# Patient Record
Sex: MALE | Race: WHITE | NOT HISPANIC OR LATINO | Employment: OTHER | URBAN - METROPOLITAN AREA
[De-identification: names, ages, dates, MRNs, and addresses within clinical notes are randomized per-mention and may not be internally consistent; named-entity substitution may affect disease eponyms.]

---

## 2023-03-03 LAB
ANION GAP IN SER/PLAS: 12 MMOL/L (ref 10–20)
CALCIUM (MG/DL) IN SER/PLAS: 9 MG/DL (ref 8.6–10.3)
CARBON DIOXIDE, TOTAL (MMOL/L) IN SER/PLAS: 30 MMOL/L (ref 21–32)
CHLORIDE (MMOL/L) IN SER/PLAS: 100 MMOL/L (ref 98–107)
CREATININE (MG/DL) IN SER/PLAS: 0.91 MG/DL (ref 0.5–1.3)
ERYTHROCYTE DISTRIBUTION WIDTH (RATIO) BY AUTOMATED COUNT: 17.6 % (ref 11.5–14.5)
ERYTHROCYTE MEAN CORPUSCULAR HEMOGLOBIN CONCENTRATION (G/DL) BY AUTOMATED: 29 G/DL (ref 32–36)
ERYTHROCYTE MEAN CORPUSCULAR VOLUME (FL) BY AUTOMATED COUNT: 86 FL (ref 80–100)
ERYTHROCYTES (10*6/UL) IN BLOOD BY AUTOMATED COUNT: 5.42 X10E12/L (ref 4.5–5.9)
GFR MALE: >90 ML/MIN/1.73M2
GLUCOSE (MG/DL) IN SER/PLAS: 229 MG/DL (ref 74–99)
HEMATOCRIT (%) IN BLOOD BY AUTOMATED COUNT: 46.5 % (ref 41–52)
HEMOGLOBIN (G/DL) IN BLOOD: 13.5 G/DL (ref 13.5–17.5)
LEUKOCYTES (10*3/UL) IN BLOOD BY AUTOMATED COUNT: 12.2 X10E9/L (ref 4.4–11.3)
NRBC (PER 100 WBCS) BY AUTOMATED COUNT: 0 /100 WBC (ref 0–0)
PLATELETS (10*3/UL) IN BLOOD AUTOMATED COUNT: 304 X10E9/L (ref 150–450)
POTASSIUM (MMOL/L) IN SER/PLAS: 4.2 MMOL/L (ref 3.5–5.3)
SODIUM (MMOL/L) IN SER/PLAS: 138 MMOL/L (ref 136–145)
UREA NITROGEN (MG/DL) IN SER/PLAS: 26 MG/DL (ref 6–23)

## 2023-03-10 LAB
ANION GAP IN SER/PLAS: 13 MMOL/L (ref 10–20)
CALCIUM (MG/DL) IN SER/PLAS: 8.4 MG/DL (ref 8.6–10.3)
CARBON DIOXIDE, TOTAL (MMOL/L) IN SER/PLAS: 28 MMOL/L (ref 21–32)
CHLORIDE (MMOL/L) IN SER/PLAS: 100 MMOL/L (ref 98–107)
CREATININE (MG/DL) IN SER/PLAS: 0.72 MG/DL (ref 0.5–1.3)
ERYTHROCYTE DISTRIBUTION WIDTH (RATIO) BY AUTOMATED COUNT: 16.8 % (ref 11.5–14.5)
ERYTHROCYTE MEAN CORPUSCULAR HEMOGLOBIN CONCENTRATION (G/DL) BY AUTOMATED: 29.7 G/DL (ref 32–36)
ERYTHROCYTE MEAN CORPUSCULAR VOLUME (FL) BY AUTOMATED COUNT: 88 FL (ref 80–100)
ERYTHROCYTES (10*6/UL) IN BLOOD BY AUTOMATED COUNT: 4.94 X10E12/L (ref 4.5–5.9)
ESTIMATED AVERAGE GLUCOSE FOR HBA1C: 177 MG/DL
GFR MALE: >90 ML/MIN/1.73M2
GLUCOSE (MG/DL) IN SER/PLAS: 214 MG/DL (ref 74–99)
HEMATOCRIT (%) IN BLOOD BY AUTOMATED COUNT: 43.4 % (ref 41–52)
HEMOGLOBIN (G/DL) IN BLOOD: 12.9 G/DL (ref 13.5–17.5)
HEMOGLOBIN A1C/HEMOGLOBIN TOTAL IN BLOOD: 7.8 %
LEUKOCYTES (10*3/UL) IN BLOOD BY AUTOMATED COUNT: 7.7 X10E9/L (ref 4.4–11.3)
NRBC (PER 100 WBCS) BY AUTOMATED COUNT: 0 /100 WBC (ref 0–0)
PLATELETS (10*3/UL) IN BLOOD AUTOMATED COUNT: 204 X10E9/L (ref 150–450)
POTASSIUM (MMOL/L) IN SER/PLAS: 4.1 MMOL/L (ref 3.5–5.3)
SODIUM (MMOL/L) IN SER/PLAS: 137 MMOL/L (ref 136–145)
UREA NITROGEN (MG/DL) IN SER/PLAS: 16 MG/DL (ref 6–23)

## 2023-05-16 LAB
ANION GAP IN SER/PLAS: 13 MMOL/L (ref 10–20)
CALCIUM (MG/DL) IN SER/PLAS: 8.7 MG/DL (ref 8.6–10.3)
CARBON DIOXIDE, TOTAL (MMOL/L) IN SER/PLAS: 30 MMOL/L (ref 21–32)
CHLORIDE (MMOL/L) IN SER/PLAS: 101 MMOL/L (ref 98–107)
CREATININE (MG/DL) IN SER/PLAS: 0.92 MG/DL (ref 0.5–1.3)
ERYTHROCYTE DISTRIBUTION WIDTH (RATIO) BY AUTOMATED COUNT: 15.5 % (ref 11.5–14.5)
ERYTHROCYTE MEAN CORPUSCULAR HEMOGLOBIN CONCENTRATION (G/DL) BY AUTOMATED: 29.5 G/DL (ref 32–36)
ERYTHROCYTE MEAN CORPUSCULAR VOLUME (FL) BY AUTOMATED COUNT: 86 FL (ref 80–100)
ERYTHROCYTES (10*6/UL) IN BLOOD BY AUTOMATED COUNT: 5.02 X10E12/L (ref 4.5–5.9)
GFR MALE: >90 ML/MIN/1.73M2
GLUCOSE (MG/DL) IN SER/PLAS: 160 MG/DL (ref 74–99)
HEMATOCRIT (%) IN BLOOD BY AUTOMATED COUNT: 43.4 % (ref 41–52)
HEMOGLOBIN (G/DL) IN BLOOD: 12.8 G/DL (ref 13.5–17.5)
LEUKOCYTES (10*3/UL) IN BLOOD BY AUTOMATED COUNT: 8.6 X10E9/L (ref 4.4–11.3)
NRBC (PER 100 WBCS) BY AUTOMATED COUNT: 0 /100 WBC (ref 0–0)
PLATELETS (10*3/UL) IN BLOOD AUTOMATED COUNT: 289 X10E9/L (ref 150–450)
POTASSIUM (MMOL/L) IN SER/PLAS: 4.1 MMOL/L (ref 3.5–5.3)
SODIUM (MMOL/L) IN SER/PLAS: 140 MMOL/L (ref 136–145)
UREA NITROGEN (MG/DL) IN SER/PLAS: 25 MG/DL (ref 6–23)

## 2023-07-03 LAB
ALANINE AMINOTRANSFERASE (SGPT) (U/L) IN SER/PLAS: 11 U/L (ref 10–52)
ALBUMIN (G/DL) IN SER/PLAS: 4.1 G/DL (ref 3.4–5)
ALKALINE PHOSPHATASE (U/L) IN SER/PLAS: 80 U/L (ref 33–120)
ANION GAP IN SER/PLAS: 11 MMOL/L (ref 10–20)
ASPARTATE AMINOTRANSFERASE (SGOT) (U/L) IN SER/PLAS: 15 U/L (ref 9–39)
BASOPHILS (10*3/UL) IN BLOOD BY AUTOMATED COUNT: 0.06 X10E9/L (ref 0–0.1)
BASOPHILS/100 LEUKOCYTES IN BLOOD BY AUTOMATED COUNT: 0.6 % (ref 0–2)
BILIRUBIN TOTAL (MG/DL) IN SER/PLAS: 0.2 MG/DL (ref 0–1.2)
CALCIUM (MG/DL) IN SER/PLAS: 9.4 MG/DL (ref 8.6–10.3)
CARBON DIOXIDE, TOTAL (MMOL/L) IN SER/PLAS: 32 MMOL/L (ref 21–32)
CHLORIDE (MMOL/L) IN SER/PLAS: 100 MMOL/L (ref 98–107)
CREATININE (MG/DL) IN SER/PLAS: 0.76 MG/DL (ref 0.5–1.3)
EOSINOPHILS (10*3/UL) IN BLOOD BY AUTOMATED COUNT: 0.46 X10E9/L (ref 0–0.7)
EOSINOPHILS/100 LEUKOCYTES IN BLOOD BY AUTOMATED COUNT: 4.5 % (ref 0–6)
ERYTHROCYTE DISTRIBUTION WIDTH (RATIO) BY AUTOMATED COUNT: 15 % (ref 11.5–14.5)
ERYTHROCYTE MEAN CORPUSCULAR HEMOGLOBIN CONCENTRATION (G/DL) BY AUTOMATED: 28.9 G/DL (ref 32–36)
ERYTHROCYTE MEAN CORPUSCULAR VOLUME (FL) BY AUTOMATED COUNT: 85 FL (ref 80–100)
ERYTHROCYTES (10*6/UL) IN BLOOD BY AUTOMATED COUNT: 4.99 X10E12/L (ref 4.5–5.9)
GFR MALE: >90 ML/MIN/1.73M2
GLUCOSE (MG/DL) IN SER/PLAS: 129 MG/DL (ref 74–99)
HEMATOCRIT (%) IN BLOOD BY AUTOMATED COUNT: 42.2 % (ref 41–52)
HEMOGLOBIN (G/DL) IN BLOOD: 12.2 G/DL (ref 13.5–17.5)
IMMATURE GRANULOCYTES/100 LEUKOCYTES IN BLOOD BY AUTOMATED COUNT: 0.2 % (ref 0–0.9)
LEUKOCYTES (10*3/UL) IN BLOOD BY AUTOMATED COUNT: 10.2 X10E9/L (ref 4.4–11.3)
LYMPHOCYTES (10*3/UL) IN BLOOD BY AUTOMATED COUNT: 3.29 X10E9/L (ref 1.2–4.8)
LYMPHOCYTES/100 LEUKOCYTES IN BLOOD BY AUTOMATED COUNT: 32.2 % (ref 13–44)
MONOCYTES (10*3/UL) IN BLOOD BY AUTOMATED COUNT: 0.93 X10E9/L (ref 0.1–1)
MONOCYTES/100 LEUKOCYTES IN BLOOD BY AUTOMATED COUNT: 9.1 % (ref 2–10)
NEUTROPHILS (10*3/UL) IN BLOOD BY AUTOMATED COUNT: 5.46 X10E9/L (ref 1.2–7.7)
NEUTROPHILS/100 LEUKOCYTES IN BLOOD BY AUTOMATED COUNT: 53.4 % (ref 40–80)
NRBC (PER 100 WBCS) BY AUTOMATED COUNT: 0 /100 WBC (ref 0–0)
PLATELETS (10*3/UL) IN BLOOD AUTOMATED COUNT: 338 X10E9/L (ref 150–450)
POTASSIUM (MMOL/L) IN SER/PLAS: 4.3 MMOL/L (ref 3.5–5.3)
PROTEIN TOTAL: 7.3 G/DL (ref 6.4–8.2)
SODIUM (MMOL/L) IN SER/PLAS: 139 MMOL/L (ref 136–145)
UREA NITROGEN (MG/DL) IN SER/PLAS: 24 MG/DL (ref 6–23)

## 2023-11-24 ENCOUNTER — APPOINTMENT (OUTPATIENT)
Dept: RADIOLOGY | Facility: HOSPITAL | Age: 53
End: 2023-11-24
Payer: COMMERCIAL

## 2023-11-24 ENCOUNTER — HOSPITAL ENCOUNTER (INPATIENT)
Facility: HOSPITAL | Age: 53
LOS: 11 days | Discharge: NURSING, REHAB OR LONG-TERM CARE | End: 2023-12-06
Attending: STUDENT IN AN ORGANIZED HEALTH CARE EDUCATION/TRAINING PROGRAM | Admitting: INTERNAL MEDICINE
Payer: COMMERCIAL

## 2023-11-24 DIAGNOSIS — N10 ACUTE PYELONEPHRITIS: ICD-10-CM

## 2023-11-24 DIAGNOSIS — A41.9 SEPSIS, DUE TO UNSPECIFIED ORGANISM, UNSPECIFIED WHETHER ACUTE ORGAN DYSFUNCTION PRESENT (MULTI): ICD-10-CM

## 2023-11-24 DIAGNOSIS — N20.0 KIDNEY STONE ON LEFT SIDE: Primary | ICD-10-CM

## 2023-11-24 DIAGNOSIS — R60.9 SWELLING: ICD-10-CM

## 2023-11-24 LAB
ALBUMIN SERPL BCP-MCNC: 3.6 G/DL (ref 3.4–5)
ALP SERPL-CCNC: 75 U/L (ref 33–120)
ALT SERPL W P-5'-P-CCNC: 46 U/L (ref 10–52)
ANION GAP SERPL CALC-SCNC: 15 MMOL/L (ref 10–20)
APPEARANCE UR: ABNORMAL
AST SERPL W P-5'-P-CCNC: 40 U/L (ref 9–39)
BACTERIA #/AREA URNS AUTO: ABNORMAL /HPF
BASOPHILS # BLD AUTO: 0.03 X10*3/UL (ref 0–0.1)
BASOPHILS NFR BLD AUTO: 0.1 %
BILIRUB DIRECT SERPL-MCNC: 0.1 MG/DL (ref 0–0.3)
BILIRUB SERPL-MCNC: 0.4 MG/DL (ref 0–1.2)
BILIRUB UR STRIP.AUTO-MCNC: NEGATIVE MG/DL
BUN SERPL-MCNC: 26 MG/DL (ref 6–23)
CALCIUM SERPL-MCNC: 9 MG/DL (ref 8.6–10.3)
CHLORIDE SERPL-SCNC: 98 MMOL/L (ref 98–107)
CO2 SERPL-SCNC: 27 MMOL/L (ref 21–32)
COLOR UR: YELLOW
CREAT SERPL-MCNC: 0.92 MG/DL (ref 0.5–1.3)
EOSINOPHIL # BLD AUTO: 0.71 X10*3/UL (ref 0–0.7)
EOSINOPHIL NFR BLD AUTO: 3.3 %
ERYTHROCYTE [DISTWIDTH] IN BLOOD BY AUTOMATED COUNT: 18.7 % (ref 11.5–14.5)
GFR SERPL CREATININE-BSD FRML MDRD: >90 ML/MIN/1.73M*2
GLUCOSE SERPL-MCNC: 184 MG/DL (ref 74–99)
GLUCOSE UR STRIP.AUTO-MCNC: NEGATIVE MG/DL
HCT VFR BLD AUTO: 49.8 % (ref 41–52)
HGB BLD-MCNC: 14.2 G/DL (ref 13.5–17.5)
HOLD SPECIMEN: NORMAL
IMM GRANULOCYTES # BLD AUTO: 0.2 X10*3/UL (ref 0–0.7)
IMM GRANULOCYTES NFR BLD AUTO: 0.9 % (ref 0–0.9)
KETONES UR STRIP.AUTO-MCNC: NEGATIVE MG/DL
LACTATE SERPL-SCNC: 1.3 MMOL/L (ref 0.4–2)
LEUKOCYTE ESTERASE UR QL STRIP.AUTO: ABNORMAL
LIPASE SERPL-CCNC: 111 U/L (ref 9–82)
LYMPHOCYTES # BLD AUTO: 2.22 X10*3/UL (ref 1.2–4.8)
LYMPHOCYTES NFR BLD AUTO: 10.4 %
MCH RBC QN AUTO: 21.7 PG (ref 26–34)
MCHC RBC AUTO-ENTMCNC: 28.5 G/DL (ref 32–36)
MCV RBC AUTO: 76 FL (ref 80–100)
MONOCYTES # BLD AUTO: 1.57 X10*3/UL (ref 0.1–1)
MONOCYTES NFR BLD AUTO: 7.4 %
NEUTROPHILS # BLD AUTO: 16.61 X10*3/UL (ref 1.2–7.7)
NEUTROPHILS NFR BLD AUTO: 77.9 %
NITRITE UR QL STRIP.AUTO: POSITIVE
NRBC BLD-RTO: 0 /100 WBCS (ref 0–0)
PH UR STRIP.AUTO: 6 [PH]
PLATELET # BLD AUTO: 309 X10*3/UL (ref 150–450)
POTASSIUM SERPL-SCNC: 5.4 MMOL/L (ref 3.5–5.3)
PROT SERPL-MCNC: 6.8 G/DL (ref 6.4–8.2)
PROT UR STRIP.AUTO-MCNC: ABNORMAL MG/DL
RBC # BLD AUTO: 6.54 X10*6/UL (ref 4.5–5.9)
RBC # UR STRIP.AUTO: ABNORMAL /UL
RBC #/AREA URNS AUTO: >20 /HPF
SODIUM SERPL-SCNC: 135 MMOL/L (ref 136–145)
SP GR UR STRIP.AUTO: 1.01
UROBILINOGEN UR STRIP.AUTO-MCNC: <2 MG/DL
WBC # BLD AUTO: 21.3 X10*3/UL (ref 4.4–11.3)
WBC #/AREA URNS AUTO: >50 /HPF
WBC CLUMPS #/AREA URNS AUTO: ABNORMAL /HPF

## 2023-11-24 PROCEDURE — 36415 COLL VENOUS BLD VENIPUNCTURE: CPT | Performed by: STUDENT IN AN ORGANIZED HEALTH CARE EDUCATION/TRAINING PROGRAM

## 2023-11-24 PROCEDURE — 83690 ASSAY OF LIPASE: CPT | Performed by: STUDENT IN AN ORGANIZED HEALTH CARE EDUCATION/TRAINING PROGRAM

## 2023-11-24 PROCEDURE — 87086 URINE CULTURE/COLONY COUNT: CPT | Mod: PARLAB | Performed by: STUDENT IN AN ORGANIZED HEALTH CARE EDUCATION/TRAINING PROGRAM

## 2023-11-24 PROCEDURE — 99291 CRITICAL CARE FIRST HOUR: CPT | Mod: 25 | Performed by: STUDENT IN AN ORGANIZED HEALTH CARE EDUCATION/TRAINING PROGRAM

## 2023-11-24 PROCEDURE — 82248 BILIRUBIN DIRECT: CPT | Performed by: STUDENT IN AN ORGANIZED HEALTH CARE EDUCATION/TRAINING PROGRAM

## 2023-11-24 PROCEDURE — 74177 CT ABD & PELVIS W/CONTRAST: CPT | Performed by: RADIOLOGY

## 2023-11-24 PROCEDURE — 74177 CT ABD & PELVIS W/CONTRAST: CPT

## 2023-11-24 PROCEDURE — 87186 SC STD MICRODIL/AGAR DIL: CPT | Mod: PARLAB | Performed by: STUDENT IN AN ORGANIZED HEALTH CARE EDUCATION/TRAINING PROGRAM

## 2023-11-24 PROCEDURE — 81001 URINALYSIS AUTO W/SCOPE: CPT | Performed by: STUDENT IN AN ORGANIZED HEALTH CARE EDUCATION/TRAINING PROGRAM

## 2023-11-24 PROCEDURE — 87077 CULTURE AEROBIC IDENTIFY: CPT | Mod: PARLAB | Performed by: STUDENT IN AN ORGANIZED HEALTH CARE EDUCATION/TRAINING PROGRAM

## 2023-11-24 PROCEDURE — 96375 TX/PRO/DX INJ NEW DRUG ADDON: CPT

## 2023-11-24 PROCEDURE — 2500000004 HC RX 250 GENERAL PHARMACY W/ HCPCS (ALT 636 FOR OP/ED): Performed by: STUDENT IN AN ORGANIZED HEALTH CARE EDUCATION/TRAINING PROGRAM

## 2023-11-24 PROCEDURE — 2550000001 HC RX 255 CONTRASTS: Performed by: STUDENT IN AN ORGANIZED HEALTH CARE EDUCATION/TRAINING PROGRAM

## 2023-11-24 PROCEDURE — 96365 THER/PROPH/DIAG IV INF INIT: CPT

## 2023-11-24 PROCEDURE — 83605 ASSAY OF LACTIC ACID: CPT | Performed by: STUDENT IN AN ORGANIZED HEALTH CARE EDUCATION/TRAINING PROGRAM

## 2023-11-24 PROCEDURE — 87040 BLOOD CULTURE FOR BACTERIA: CPT | Performed by: STUDENT IN AN ORGANIZED HEALTH CARE EDUCATION/TRAINING PROGRAM

## 2023-11-24 PROCEDURE — 80053 COMPREHEN METABOLIC PANEL: CPT | Performed by: STUDENT IN AN ORGANIZED HEALTH CARE EDUCATION/TRAINING PROGRAM

## 2023-11-24 PROCEDURE — 85025 COMPLETE CBC W/AUTO DIFF WBC: CPT | Performed by: STUDENT IN AN ORGANIZED HEALTH CARE EDUCATION/TRAINING PROGRAM

## 2023-11-24 PROCEDURE — 87040 BLOOD CULTURE FOR BACTERIA: CPT | Mod: PARLAB | Performed by: STUDENT IN AN ORGANIZED HEALTH CARE EDUCATION/TRAINING PROGRAM

## 2023-11-24 PROCEDURE — 96361 HYDRATE IV INFUSION ADD-ON: CPT

## 2023-11-24 RX ORDER — MORPHINE SULFATE 4 MG/ML
4 INJECTION, SOLUTION INTRAMUSCULAR; INTRAVENOUS ONCE
Status: COMPLETED | OUTPATIENT
Start: 2023-11-24 | End: 2023-11-24

## 2023-11-24 RX ORDER — ONDANSETRON HYDROCHLORIDE 2 MG/ML
4 INJECTION, SOLUTION INTRAVENOUS ONCE
Status: COMPLETED | OUTPATIENT
Start: 2023-11-24 | End: 2023-11-24

## 2023-11-24 RX ORDER — KETOROLAC TROMETHAMINE 30 MG/ML
15 INJECTION, SOLUTION INTRAMUSCULAR; INTRAVENOUS ONCE
Status: COMPLETED | OUTPATIENT
Start: 2023-11-24 | End: 2023-11-24

## 2023-11-24 RX ORDER — CEFTRIAXONE 2 G/50ML
2 INJECTION, SOLUTION INTRAVENOUS ONCE
Status: COMPLETED | OUTPATIENT
Start: 2023-11-24 | End: 2023-11-24

## 2023-11-24 RX ADMIN — CEFTRIAXONE SODIUM 2 G: 2 INJECTION, SOLUTION INTRAVENOUS at 22:02

## 2023-11-24 RX ADMIN — MORPHINE SULFATE 4 MG: 4 INJECTION, SOLUTION INTRAMUSCULAR; INTRAVENOUS at 17:44

## 2023-11-24 RX ADMIN — IOHEXOL 75 ML: 350 INJECTION, SOLUTION INTRAVENOUS at 20:59

## 2023-11-24 RX ADMIN — KETOROLAC TROMETHAMINE 15 MG: 30 INJECTION INTRAMUSCULAR; INTRAVENOUS at 19:51

## 2023-11-24 RX ADMIN — ONDANSETRON 4 MG: 2 INJECTION INTRAMUSCULAR; INTRAVENOUS at 17:44

## 2023-11-24 RX ADMIN — SODIUM CHLORIDE, POTASSIUM CHLORIDE, SODIUM LACTATE AND CALCIUM CHLORIDE 1000 ML: 600; 310; 30; 20 INJECTION, SOLUTION INTRAVENOUS at 19:51

## 2023-11-24 ASSESSMENT — COLUMBIA-SUICIDE SEVERITY RATING SCALE - C-SSRS
1. IN THE PAST MONTH, HAVE YOU WISHED YOU WERE DEAD OR WISHED YOU COULD GO TO SLEEP AND NOT WAKE UP?: NO
6. HAVE YOU EVER DONE ANYTHING, STARTED TO DO ANYTHING, OR PREPARED TO DO ANYTHING TO END YOUR LIFE?: NO
2. HAVE YOU ACTUALLY HAD ANY THOUGHTS OF KILLING YOURSELF?: NO

## 2023-11-24 ASSESSMENT — PAIN - FUNCTIONAL ASSESSMENT
PAIN_FUNCTIONAL_ASSESSMENT: 0-10

## 2023-11-24 ASSESSMENT — LIFESTYLE VARIABLES
HAVE YOU EVER FELT YOU SHOULD CUT DOWN ON YOUR DRINKING: NO
EVER FELT BAD OR GUILTY ABOUT YOUR DRINKING: NO
REASON UNABLE TO ASSESS: NO
EVER HAD A DRINK FIRST THING IN THE MORNING TO STEADY YOUR NERVES TO GET RID OF A HANGOVER: NO
HAVE PEOPLE ANNOYED YOU BY CRITICIZING YOUR DRINKING: NO

## 2023-11-24 ASSESSMENT — PAIN SCALES - GENERAL
PAINLEVEL_OUTOF10: 9
PAINLEVEL_OUTOF10: 10 - WORST POSSIBLE PAIN
PAINLEVEL_OUTOF10: 3

## 2023-11-24 ASSESSMENT — PAIN DESCRIPTION - PROGRESSION: CLINICAL_PROGRESSION: NOT CHANGED

## 2023-11-24 ASSESSMENT — PAIN DESCRIPTION - PAIN TYPE: TYPE: ACUTE PAIN

## 2023-11-24 ASSESSMENT — PAIN DESCRIPTION - DESCRIPTORS: DESCRIPTORS: STABBING

## 2023-11-24 NOTE — ED PROVIDER NOTES
HPI   Chief Complaint   Patient presents with    Flank Pain       Presents with left-sided flank pain.  Onset today.  Intermittent.  Associated nausea.  Feels like prior kidney stone.  No change in output from ostomy.  States that he normally urinates into a urinal and has been noticing that some hesitancy, no gross blood or dysuria.  No reported falls.      History provided by:  EMS personnel and patient   used: No                        Com[place] Coma Scale Score: 15                  Patient History   No past medical history on file.  No past surgical history on file.  No family history on file.  Social History     Tobacco Use    Smoking status: Not on file    Smokeless tobacco: Not on file   Substance Use Topics    Alcohol use: Not on file    Drug use: Not on file       Physical Exam   ED Triage Vitals   Temp Pulse Resp BP   -- -- -- --      SpO2 Temp src Heart Rate Source Patient Position   -- -- -- --      BP Location FiO2 (%)     -- --       Physical Exam  Vitals and nursing note reviewed.   HENT:      Head: Atraumatic.      Mouth/Throat:      Mouth: Mucous membranes are moist.   Eyes:      Conjunctiva/sclera: Conjunctivae normal.   Cardiovascular:      Rate and Rhythm: Normal rate and regular rhythm.   Pulmonary:      Effort: Pulmonary effort is normal.      Comments: On trach.  Coarse breath sounds bilaterally.  Abdominal:      Palpations: Abdomen is soft.      Tenderness: There is abdominal tenderness (Left upper quadrant, left lower quadrant, and left flank tenderness palpation without guarding or rebound.).   Genitourinary:     Comments: No CVA tenderness.  Musculoskeletal:         General: No deformity.      Cervical back: Normal range of motion.   Skin:     General: Skin is warm and dry.      Comments: No rash on flanks   Neurological:      Mental Status: He is alert.      Comments: Baseline contracture of right upper extremity.         ED Course & MDM   ED Course as of 11/25/23 0047    Fri Nov 24, 2023 1742 WBC(!): 21.3 [AB]   1745 My ECG interpretation: Normal sinus rhythm heart rate 105, no ST segment elevation, QTc 430 [AB]   2034 Urinalysis with Reflex Microscopic and Culture(!)  Reached out to pharmacy for assistance with antibiotics for UTI, given 5/2022 urine culture significant for ESBL Klebsiella [AB]   2149 CT abdomen pelvis w IV contrast  Per my view the CT of the abdomen and pelvis with IV contrast, there is an approximately 5 mm proximal ureteral stone on the left with hydronephrosis.  Will reach out to urology for concerns of obstructed, infected stone. [AB]   2206 Spoke with Urology -- recommends IR for perc neph tube [AB]   2259 Spoke with Dr. Desir with IR -- will place perc neph tube in AM.  Given hemodynamic stability, I think this is reasonable.  Will reach back out to IR, if patient decompensates/cannot wait until the AM. [AB]      ED Course User Index  [AB] Dayday Mckeon MD         Diagnoses as of 11/25/23 0047   Kidney stone on left side   Sepsis, due to unspecified organism, unspecified whether acute organ dysfunction present (CMS/[facility])   Acute pyelonephritis       Medical Decision Making  Clinical picture consistent with obstructing and infected ureteral stone complicated by sepsis.  Concern for sepsis of urinary etiology at approximately 1942 when the patient is urinalysis resulted.    Urology and interventional radiology were consulted for the obstructive process.    I performed a sepsis reperfusion exam at approximately 2300.  Patient remained warm and well-perfused.    Amount and/or Complexity of Data Reviewed  Labs: ordered.  Radiology: ordered and independent interpretation performed. Decision-making details documented in ED Course.  ECG/medicine tests: ordered and independent interpretation performed. Decision-making details documented in ED Course.  Discussion of management or test interpretation with external provider(s): The admitting provider, the  urologist on-call, and the interventional radiologist on-call    Risk  Decision regarding hospitalization.        Procedure  Critical Care    Performed by: Dayday Mckeon MD  Authorized by: Dayday Mckeon MD    Critical care provider statement:     Critical care time (minutes):  45    Critical care time was exclusive of:  Separately billable procedures and treating other patients    Critical care was necessary to treat or prevent imminent or life-threatening deterioration of the following conditions:  Sepsis    Critical care was time spent personally by me on the following activities:  Blood draw for specimens, development of treatment plan with patient or surrogate, discussions with consultants, evaluation of patient's response to treatment, examination of patient, obtaining history from patient or surrogate, ordering and performing treatments and interventions, ordering and review of laboratory studies, ordering and review of radiographic studies, pulse oximetry, review of old charts and re-evaluation of patient's condition    I assumed direction of critical care for this patient from another provider in my specialty: no      Care discussed with: admitting provider         Dayday Mckeon MD  11/25/23 0047

## 2023-11-24 NOTE — Clinical Note
Left neph tube in place by sutures, m-fix and tegaderm - attached to drainage bag. Procedure end. Best rest for 3 hours per Dr. Desir

## 2023-11-25 ENCOUNTER — PREP FOR PROCEDURE (OUTPATIENT)
Dept: RADIOLOGY | Facility: HOSPITAL | Age: 53
End: 2023-11-25
Payer: COMMERCIAL

## 2023-11-25 ENCOUNTER — HOSPITAL ENCOUNTER (OUTPATIENT)
Dept: CARDIOLOGY | Facility: HOSPITAL | Age: 53
Discharge: HOME | End: 2023-11-25
Payer: COMMERCIAL

## 2023-11-25 ENCOUNTER — APPOINTMENT (OUTPATIENT)
Dept: RADIOLOGY | Facility: HOSPITAL | Age: 53
End: 2023-11-25
Payer: COMMERCIAL

## 2023-11-25 DIAGNOSIS — N20.0 KIDNEY STONE ON LEFT SIDE: Primary | ICD-10-CM

## 2023-11-25 DIAGNOSIS — I24.9 ACS (ACUTE CORONARY SYNDROME) (MULTI): ICD-10-CM

## 2023-11-25 LAB
ALBUMIN SERPL BCP-MCNC: 3.1 G/DL (ref 3.4–5)
ALP SERPL-CCNC: 75 U/L (ref 33–120)
ALT SERPL W P-5'-P-CCNC: 34 U/L (ref 10–52)
ANION GAP SERPL CALC-SCNC: 12 MMOL/L (ref 10–20)
AST SERPL W P-5'-P-CCNC: 24 U/L (ref 9–39)
BILIRUB SERPL-MCNC: 0.5 MG/DL (ref 0–1.2)
BUN SERPL-MCNC: 29 MG/DL (ref 6–23)
CALCIUM SERPL-MCNC: 8 MG/DL (ref 8.6–10.3)
CHLORIDE SERPL-SCNC: 99 MMOL/L (ref 98–107)
CO2 SERPL-SCNC: 28 MMOL/L (ref 21–32)
CREAT SERPL-MCNC: 1.54 MG/DL (ref 0.5–1.3)
ERYTHROCYTE [DISTWIDTH] IN BLOOD BY AUTOMATED COUNT: 18.6 % (ref 11.5–14.5)
GFR SERPL CREATININE-BSD FRML MDRD: 54 ML/MIN/1.73M*2
GLUCOSE BLD MANUAL STRIP-MCNC: 161 MG/DL (ref 74–99)
GLUCOSE BLD MANUAL STRIP-MCNC: 165 MG/DL (ref 74–99)
GLUCOSE BLD MANUAL STRIP-MCNC: 190 MG/DL (ref 74–99)
GLUCOSE BLD MANUAL STRIP-MCNC: 193 MG/DL (ref 74–99)
GLUCOSE SERPL-MCNC: 164 MG/DL (ref 74–99)
HCT VFR BLD AUTO: 44.2 % (ref 41–52)
HGB BLD-MCNC: 12.2 G/DL (ref 13.5–17.5)
INR PPP: 1.3 (ref 0.9–1.1)
MCH RBC QN AUTO: 22.1 PG (ref 26–34)
MCHC RBC AUTO-ENTMCNC: 27.6 G/DL (ref 32–36)
MCV RBC AUTO: 80 FL (ref 80–100)
NRBC BLD-RTO: 0 /100 WBCS (ref 0–0)
PLATELET # BLD AUTO: 269 X10*3/UL (ref 150–450)
POTASSIUM SERPL-SCNC: 5.3 MMOL/L (ref 3.5–5.3)
PROT SERPL-MCNC: 5.6 G/DL (ref 6.4–8.2)
PROTHROMBIN TIME: 14.4 SECONDS (ref 9.8–12.8)
RBC # BLD AUTO: 5.52 X10*6/UL (ref 4.5–5.9)
SODIUM SERPL-SCNC: 134 MMOL/L (ref 136–145)
WBC # BLD AUTO: 50 X10*3/UL (ref 4.4–11.3)

## 2023-11-25 PROCEDURE — 36415 COLL VENOUS BLD VENIPUNCTURE: CPT | Performed by: RADIOLOGY

## 2023-11-25 PROCEDURE — 2500000002 HC RX 250 W HCPCS SELF ADMINISTERED DRUGS (ALT 637 FOR MEDICARE OP, ALT 636 FOR OP/ED): Performed by: NURSE PRACTITIONER

## 2023-11-25 PROCEDURE — 2500000005 HC RX 250 GENERAL PHARMACY W/O HCPCS: Performed by: INTERNAL MEDICINE

## 2023-11-25 PROCEDURE — 87070 CULTURE OTHR SPECIMN AEROBIC: CPT | Mod: PARLAB | Performed by: RADIOLOGY

## 2023-11-25 PROCEDURE — 2500000005 HC RX 250 GENERAL PHARMACY W/O HCPCS: Performed by: NURSE PRACTITIONER

## 2023-11-25 PROCEDURE — 85027 COMPLETE CBC AUTOMATED: CPT | Performed by: NURSE PRACTITIONER

## 2023-11-25 PROCEDURE — 2500000004 HC RX 250 GENERAL PHARMACY W/ HCPCS (ALT 636 FOR OP/ED): Performed by: RADIOLOGY

## 2023-11-25 PROCEDURE — 82947 ASSAY GLUCOSE BLOOD QUANT: CPT

## 2023-11-25 PROCEDURE — 84075 ASSAY ALKALINE PHOSPHATASE: CPT | Performed by: NURSE PRACTITIONER

## 2023-11-25 PROCEDURE — 99152 MOD SED SAME PHYS/QHP 5/>YRS: CPT

## 2023-11-25 PROCEDURE — C1894 INTRO/SHEATH, NON-LASER: HCPCS

## 2023-11-25 PROCEDURE — 99152 MOD SED SAME PHYS/QHP 5/>YRS: CPT | Performed by: RADIOLOGY

## 2023-11-25 PROCEDURE — C1729 CATH, DRAINAGE: HCPCS

## 2023-11-25 PROCEDURE — 2060000001 HC INTERMEDIATE ICU ROOM DAILY

## 2023-11-25 PROCEDURE — 2500000004 HC RX 250 GENERAL PHARMACY W/ HCPCS (ALT 636 FOR OP/ED): Performed by: STUDENT IN AN ORGANIZED HEALTH CARE EDUCATION/TRAINING PROGRAM

## 2023-11-25 PROCEDURE — 2500000001 HC RX 250 WO HCPCS SELF ADMINISTERED DRUGS (ALT 637 FOR MEDICARE OP): Performed by: STUDENT IN AN ORGANIZED HEALTH CARE EDUCATION/TRAINING PROGRAM

## 2023-11-25 PROCEDURE — 0T9430Z DRAINAGE OF LEFT KIDNEY PELVIS WITH DRAINAGE DEVICE, PERCUTANEOUS APPROACH: ICD-10-PCS | Performed by: RADIOLOGY

## 2023-11-25 PROCEDURE — 2500000004 HC RX 250 GENERAL PHARMACY W/ HCPCS (ALT 636 FOR OP/ED): Performed by: NURSE PRACTITIONER

## 2023-11-25 PROCEDURE — 50432 PLMT NEPHROSTOMY CATHETER: CPT | Performed by: RADIOLOGY

## 2023-11-25 PROCEDURE — 2500000004 HC RX 250 GENERAL PHARMACY W/ HCPCS (ALT 636 FOR OP/ED): Performed by: EMERGENCY MEDICINE

## 2023-11-25 PROCEDURE — 85610 PROTHROMBIN TIME: CPT | Performed by: RADIOLOGY

## 2023-11-25 PROCEDURE — 99153 MOD SED SAME PHYS/QHP EA: CPT

## 2023-11-25 PROCEDURE — 93005 ELECTROCARDIOGRAM TRACING: CPT

## 2023-11-25 PROCEDURE — 2500000004 HC RX 250 GENERAL PHARMACY W/ HCPCS (ALT 636 FOR OP/ED): Performed by: INTERNAL MEDICINE

## 2023-11-25 PROCEDURE — 2500000001 HC RX 250 WO HCPCS SELF ADMINISTERED DRUGS (ALT 637 FOR MEDICARE OP): Performed by: NURSE PRACTITIONER

## 2023-11-25 PROCEDURE — 87186 SC STD MICRODIL/AGAR DIL: CPT | Mod: PARLAB | Performed by: RADIOLOGY

## 2023-11-25 PROCEDURE — 94640 AIRWAY INHALATION TREATMENT: CPT

## 2023-11-25 PROCEDURE — 36415 COLL VENOUS BLD VENIPUNCTURE: CPT | Performed by: NURSE PRACTITIONER

## 2023-11-25 PROCEDURE — 2720000007 HC OR 272 NO HCPCS

## 2023-11-25 PROCEDURE — 99153 MOD SED SAME PHYS/QHP EA: CPT | Performed by: RADIOLOGY

## 2023-11-25 RX ORDER — METOPROLOL TARTRATE 1 MG/ML
5 INJECTION, SOLUTION INTRAVENOUS ONCE
Status: COMPLETED | OUTPATIENT
Start: 2023-11-25 | End: 2023-11-25

## 2023-11-25 RX ORDER — LACOSAMIDE 100 MG/1
100 TABLET ORAL 2 TIMES DAILY
COMMUNITY
Start: 2022-05-04

## 2023-11-25 RX ORDER — LACOSAMIDE 100 MG/1
100 TABLET ORAL EVERY 12 HOURS SCHEDULED
Status: DISCONTINUED | OUTPATIENT
Start: 2023-11-25 | End: 2023-12-06 | Stop reason: HOSPADM

## 2023-11-25 RX ORDER — CYCLOBENZAPRINE HCL 10 MG
5 TABLET ORAL 2 TIMES DAILY PRN
Status: DISCONTINUED | OUTPATIENT
Start: 2023-11-25 | End: 2023-11-29

## 2023-11-25 RX ORDER — HYDROXYZINE PAMOATE 25 MG/1
25 CAPSULE ORAL EVERY 6 HOURS PRN
Status: DISCONTINUED | OUTPATIENT
Start: 2023-11-25 | End: 2023-12-06 | Stop reason: HOSPADM

## 2023-11-25 RX ORDER — LEVETIRACETAM 500 MG/1
750 TABLET ORAL 2 TIMES DAILY
Status: DISCONTINUED | OUTPATIENT
Start: 2023-11-25 | End: 2023-12-06 | Stop reason: HOSPADM

## 2023-11-25 RX ORDER — LORAZEPAM 1 MG/1
1 TABLET ORAL EVERY 6 HOURS PRN
COMMUNITY

## 2023-11-25 RX ORDER — IPRATROPIUM BROMIDE AND ALBUTEROL SULFATE 2.5; .5 MG/3ML; MG/3ML
3 SOLUTION RESPIRATORY (INHALATION) EVERY 2 HOUR PRN
Status: DISCONTINUED | OUTPATIENT
Start: 2023-11-25 | End: 2023-12-03

## 2023-11-25 RX ORDER — SERTRALINE HYDROCHLORIDE 50 MG/1
50 TABLET, FILM COATED ORAL DAILY
COMMUNITY
Start: 2018-04-05

## 2023-11-25 RX ORDER — ACETAMINOPHEN 325 MG/1
650 TABLET ORAL ONCE
Status: COMPLETED | OUTPATIENT
Start: 2023-11-25 | End: 2023-11-25

## 2023-11-25 RX ORDER — DEXTROSE MONOHYDRATE 100 MG/ML
0.3 INJECTION, SOLUTION INTRAVENOUS ONCE AS NEEDED
Status: DISCONTINUED | OUTPATIENT
Start: 2023-11-25 | End: 2023-12-06 | Stop reason: HOSPADM

## 2023-11-25 RX ORDER — INSULIN LISPRO 100 [IU]/ML
0-5 INJECTION, SOLUTION INTRAVENOUS; SUBCUTANEOUS
Status: DISCONTINUED | OUTPATIENT
Start: 2023-11-25 | End: 2023-12-06 | Stop reason: HOSPADM

## 2023-11-25 RX ORDER — CYCLOBENZAPRINE HCL 10 MG
5 TABLET ORAL 2 TIMES DAILY PRN
COMMUNITY

## 2023-11-25 RX ORDER — FOLIC ACID 1 MG/1
1 TABLET ORAL DAILY
COMMUNITY

## 2023-11-25 RX ORDER — LEVETIRACETAM 750 MG/1
750 TABLET ORAL 2 TIMES DAILY
COMMUNITY
Start: 2021-12-27

## 2023-11-25 RX ORDER — IBUPROFEN 600 MG/1
600 TABLET ORAL EVERY 6 HOURS PRN
Status: DISCONTINUED | OUTPATIENT
Start: 2023-11-25 | End: 2023-12-06 | Stop reason: HOSPADM

## 2023-11-25 RX ORDER — LORAZEPAM 0.5 MG/1
1 TABLET ORAL EVERY 6 HOURS PRN
Status: DISCONTINUED | OUTPATIENT
Start: 2023-11-25 | End: 2023-12-06 | Stop reason: HOSPADM

## 2023-11-25 RX ORDER — ONDANSETRON HYDROCHLORIDE 2 MG/ML
4 INJECTION, SOLUTION INTRAVENOUS EVERY 8 HOURS PRN
Status: DISCONTINUED | OUTPATIENT
Start: 2023-11-25 | End: 2023-12-06 | Stop reason: HOSPADM

## 2023-11-25 RX ORDER — BACLOFEN 10 MG/1
15 TABLET ORAL EVERY 6 HOURS PRN
Status: DISCONTINUED | OUTPATIENT
Start: 2023-11-25 | End: 2023-12-06 | Stop reason: HOSPADM

## 2023-11-25 RX ORDER — BUDESONIDE 0.5 MG/2ML
0.5 INHALANT ORAL
COMMUNITY
Start: 2022-03-23

## 2023-11-25 RX ORDER — DOCUSATE SODIUM 100 MG/1
100 CAPSULE, LIQUID FILLED ORAL 2 TIMES DAILY
Status: DISCONTINUED | OUTPATIENT
Start: 2023-11-25 | End: 2023-12-06 | Stop reason: HOSPADM

## 2023-11-25 RX ORDER — FOLIC ACID 1 MG/1
1 TABLET ORAL DAILY
Status: DISCONTINUED | OUTPATIENT
Start: 2023-11-25 | End: 2023-12-06 | Stop reason: HOSPADM

## 2023-11-25 RX ORDER — ONDANSETRON 4 MG/1
4 TABLET, ORALLY DISINTEGRATING ORAL EVERY 8 HOURS PRN
Status: DISCONTINUED | OUTPATIENT
Start: 2023-11-25 | End: 2023-12-06 | Stop reason: HOSPADM

## 2023-11-25 RX ORDER — CEFTRIAXONE 2 G/50ML
2 INJECTION, SOLUTION INTRAVENOUS EVERY 24 HOURS
Status: DISCONTINUED | OUTPATIENT
Start: 2023-11-25 | End: 2023-11-25

## 2023-11-25 RX ORDER — IPRATROPIUM BROMIDE AND ALBUTEROL SULFATE 2.5; .5 MG/3ML; MG/3ML
3 SOLUTION RESPIRATORY (INHALATION) EVERY 2 HOUR PRN
COMMUNITY

## 2023-11-25 RX ORDER — SODIUM CHLORIDE 9 MG/ML
100 INJECTION, SOLUTION INTRAVENOUS CONTINUOUS
Status: DISCONTINUED | OUTPATIENT
Start: 2023-11-25 | End: 2023-11-26

## 2023-11-25 RX ORDER — ACETAMINOPHEN 325 MG/1
650 TABLET ORAL EVERY 4 HOURS PRN
Status: DISCONTINUED | OUTPATIENT
Start: 2023-11-25 | End: 2023-12-06 | Stop reason: HOSPADM

## 2023-11-25 RX ORDER — LORATADINE 10 MG/1
5 TABLET ORAL DAILY
Status: DISCONTINUED | OUTPATIENT
Start: 2023-11-25 | End: 2023-12-06 | Stop reason: HOSPADM

## 2023-11-25 RX ORDER — ESOMEPRAZOLE MAGNESIUM 40 MG/1
40 GRANULE, DELAYED RELEASE ORAL
Status: DISCONTINUED | OUTPATIENT
Start: 2023-11-25 | End: 2023-12-06 | Stop reason: HOSPADM

## 2023-11-25 RX ORDER — MORPHINE SULFATE 4 MG/ML
4 INJECTION, SOLUTION INTRAMUSCULAR; INTRAVENOUS ONCE
Status: COMPLETED | OUTPATIENT
Start: 2023-11-25 | End: 2023-11-25

## 2023-11-25 RX ORDER — FENTANYL CITRATE 50 UG/ML
INJECTION, SOLUTION INTRAMUSCULAR; INTRAVENOUS
Status: COMPLETED | OUTPATIENT
Start: 2023-11-25 | End: 2023-11-25

## 2023-11-25 RX ORDER — OXYCODONE AND ACETAMINOPHEN 5; 325 MG/1; MG/1
1 TABLET ORAL 2 TIMES DAILY
COMMUNITY

## 2023-11-25 RX ORDER — METFORMIN HYDROCHLORIDE 500 MG/1
500 TABLET ORAL DAILY
COMMUNITY

## 2023-11-25 RX ORDER — HYDROCORTISONE 10 MG/ML
LOTION TOPICAL 2 TIMES DAILY
Status: DISCONTINUED | OUTPATIENT
Start: 2023-11-25 | End: 2023-12-06 | Stop reason: HOSPADM

## 2023-11-25 RX ORDER — SERTRALINE HYDROCHLORIDE 50 MG/1
50 TABLET, FILM COATED ORAL DAILY
Status: DISCONTINUED | OUTPATIENT
Start: 2023-11-25 | End: 2023-12-06 | Stop reason: HOSPADM

## 2023-11-25 RX ORDER — DEXTROSE 50 % IN WATER (D50W) INTRAVENOUS SYRINGE
25
Status: DISCONTINUED | OUTPATIENT
Start: 2023-11-25 | End: 2023-12-06 | Stop reason: HOSPADM

## 2023-11-25 RX ORDER — GUAIFENESIN/DEXTROMETHORPHAN 100-10MG/5
10 SYRUP ORAL EVERY 4 HOURS PRN
Status: DISCONTINUED | OUTPATIENT
Start: 2023-11-25 | End: 2023-12-06 | Stop reason: HOSPADM

## 2023-11-25 RX ORDER — PANTOPRAZOLE SODIUM 40 MG/1
40 TABLET, DELAYED RELEASE ORAL
Status: DISCONTINUED | OUTPATIENT
Start: 2023-11-25 | End: 2023-11-27

## 2023-11-25 RX ORDER — HYDROXYZINE PAMOATE 25 MG/1
25 CAPSULE ORAL EVERY 6 HOURS PRN
COMMUNITY
Start: 2023-11-13 | End: 2023-11-27

## 2023-11-25 RX ORDER — ACETAMINOPHEN 325 MG/1
650 TABLET ORAL EVERY 4 HOURS PRN
COMMUNITY
Start: 2018-04-04

## 2023-11-25 RX ORDER — OXYCODONE AND ACETAMINOPHEN 5; 325 MG/1; MG/1
1 TABLET ORAL EVERY 8 HOURS PRN
COMMUNITY

## 2023-11-25 RX ORDER — BUDESONIDE 0.5 MG/2ML
0.5 INHALANT ORAL
Status: DISCONTINUED | OUTPATIENT
Start: 2023-11-25 | End: 2023-12-06 | Stop reason: HOSPADM

## 2023-11-25 RX ORDER — GUAIFENESIN/DEXTROMETHORPHAN 100-10MG/5
10 SYRUP ORAL EVERY 4 HOURS PRN
COMMUNITY

## 2023-11-25 RX ORDER — BACLOFEN 10 MG/1
10 TABLET ORAL ONCE
Status: COMPLETED | OUTPATIENT
Start: 2023-11-25 | End: 2023-11-25

## 2023-11-25 RX ORDER — BACLOFEN 5 MG/1
15 TABLET ORAL EVERY 6 HOURS PRN
COMMUNITY
Start: 2022-05-25

## 2023-11-25 RX ORDER — CHOLECALCIFEROL (VITAMIN D3) 125 MCG
5000 CAPSULE ORAL
Status: DISCONTINUED | OUTPATIENT
Start: 2023-11-28 | End: 2023-12-06 | Stop reason: HOSPADM

## 2023-11-25 RX ORDER — L. ACIDOPHILUS/L.BULGARICUS 1MM CELL
1 TABLET ORAL 2 TIMES DAILY
Status: DISCONTINUED | OUTPATIENT
Start: 2023-11-25 | End: 2023-12-06 | Stop reason: HOSPADM

## 2023-11-25 RX ORDER — OXYCODONE AND ACETAMINOPHEN 5; 325 MG/1; MG/1
1 TABLET ORAL 2 TIMES DAILY
Status: DISCONTINUED | OUTPATIENT
Start: 2023-11-25 | End: 2023-12-06 | Stop reason: HOSPADM

## 2023-11-25 RX ORDER — ACETAMINOPHEN 500 MG
5000 TABLET ORAL
COMMUNITY

## 2023-11-25 RX ORDER — OXYCODONE AND ACETAMINOPHEN 5; 325 MG/1; MG/1
1 TABLET ORAL EVERY 8 HOURS PRN
Status: DISCONTINUED | OUTPATIENT
Start: 2023-11-25 | End: 2023-12-06 | Stop reason: HOSPADM

## 2023-11-25 RX ORDER — METOPROLOL TARTRATE 1 MG/ML
5 INJECTION, SOLUTION INTRAVENOUS EVERY 5 MIN PRN
Status: COMPLETED | OUTPATIENT
Start: 2023-11-25 | End: 2023-11-25

## 2023-11-25 RX ORDER — IBUPROFEN 600 MG/1
600 TABLET ORAL EVERY 6 HOURS PRN
COMMUNITY

## 2023-11-25 RX ORDER — MIDAZOLAM HYDROCHLORIDE 1 MG/ML
INJECTION INTRAMUSCULAR; INTRAVENOUS
Status: COMPLETED | OUTPATIENT
Start: 2023-11-25 | End: 2023-11-25

## 2023-11-25 RX ADMIN — OXYCODONE HYDROCHLORIDE AND ACETAMINOPHEN 1 TABLET: 5; 325 TABLET ORAL at 12:03

## 2023-11-25 RX ADMIN — INSULIN LISPRO 1 UNITS: 100 INJECTION, SOLUTION INTRAVENOUS; SUBCUTANEOUS at 16:37

## 2023-11-25 RX ADMIN — OXYCODONE HYDROCHLORIDE AND ACETAMINOPHEN 1 TABLET: 5; 325 TABLET ORAL at 20:24

## 2023-11-25 RX ADMIN — HYDROCORTISONE: 10 LOTION TOPICAL at 09:00

## 2023-11-25 RX ADMIN — FOLIC ACID 1 MG: 1 TABLET ORAL at 12:03

## 2023-11-25 RX ADMIN — Medication 1 TABLET: at 12:02

## 2023-11-25 RX ADMIN — LEVETIRACETAM 750 MG: 500 TABLET, FILM COATED ORAL at 20:23

## 2023-11-25 RX ADMIN — Medication 1 TABLET: at 20:23

## 2023-11-25 RX ADMIN — LEVETIRACETAM 750 MG: 500 TABLET, FILM COATED ORAL at 11:58

## 2023-11-25 RX ADMIN — SODIUM CHLORIDE, POTASSIUM CHLORIDE, SODIUM LACTATE AND CALCIUM CHLORIDE 1000 ML: 600; 310; 30; 20 INJECTION, SOLUTION INTRAVENOUS at 00:48

## 2023-11-25 RX ADMIN — PIPERACILLIN SODIUM AND TAZOBACTAM SODIUM 3.38 G: 3; .375 INJECTION, SOLUTION INTRAVENOUS at 17:55

## 2023-11-25 RX ADMIN — DOCUSATE SODIUM 100 MG: 100 CAPSULE, LIQUID FILLED ORAL at 20:23

## 2023-11-25 RX ADMIN — LACOSAMIDE 100 MG: 100 TABLET, FILM COATED ORAL at 20:24

## 2023-11-25 RX ADMIN — BACLOFEN 10 MG: 10 TABLET ORAL at 00:51

## 2023-11-25 RX ADMIN — LACOSAMIDE 100 MG: 100 TABLET, FILM COATED ORAL at 12:04

## 2023-11-25 RX ADMIN — METOPROLOL TARTRATE 5 MG: 5 INJECTION INTRAVENOUS at 05:03

## 2023-11-25 RX ADMIN — OXYCODONE HYDROCHLORIDE AND ACETAMINOPHEN 1 TABLET: 5; 325 TABLET ORAL at 17:53

## 2023-11-25 RX ADMIN — HYDROCORTISONE: 10 LOTION TOPICAL at 20:24

## 2023-11-25 RX ADMIN — METOPROLOL TARTRATE 5 MG: 5 INJECTION INTRAVENOUS at 12:54

## 2023-11-25 RX ADMIN — SERTRALINE HYDROCHLORIDE 50 MG: 50 TABLET ORAL at 12:03

## 2023-11-25 RX ADMIN — MIDAZOLAM HYDROCHLORIDE 1 MG: 1 INJECTION, SOLUTION INTRAMUSCULAR; INTRAVENOUS at 10:15

## 2023-11-25 RX ADMIN — Medication 5 DROP: at 20:25

## 2023-11-25 RX ADMIN — FENTANYL CITRATE 50 MCG: 50 INJECTION, SOLUTION INTRAMUSCULAR; INTRAVENOUS at 10:15

## 2023-11-25 RX ADMIN — SODIUM CHLORIDE 100 ML/HR: 9 INJECTION, SOLUTION INTRAVENOUS at 08:35

## 2023-11-25 RX ADMIN — PANTOPRAZOLE SODIUM 40 MG: 40 TABLET, DELAYED RELEASE ORAL at 12:05

## 2023-11-25 RX ADMIN — DOCUSATE SODIUM 100 MG: 100 CAPSULE, LIQUID FILLED ORAL at 12:04

## 2023-11-25 RX ADMIN — MORPHINE SULFATE 4 MG: 4 INJECTION, SOLUTION INTRAMUSCULAR; INTRAVENOUS at 02:16

## 2023-11-25 RX ADMIN — PIPERACILLIN SODIUM AND TAZOBACTAM SODIUM 3.38 G: 3; .375 INJECTION, SOLUTION INTRAVENOUS at 23:48

## 2023-11-25 RX ADMIN — BUDESONIDE 0.5 MG: 0.5 INHALANT ORAL at 19:38

## 2023-11-25 RX ADMIN — ACETAMINOPHEN 650 MG: 325 TABLET ORAL at 03:46

## 2023-11-25 RX ADMIN — CEFTRIAXONE SODIUM 2 G: 2 INJECTION, SOLUTION INTRAVENOUS at 11:53

## 2023-11-25 RX ADMIN — METOPROLOL TARTRATE 5 MG: 5 INJECTION INTRAVENOUS at 03:19

## 2023-11-25 RX ADMIN — LORATADINE 5 MG: 10 TABLET ORAL at 12:04

## 2023-11-25 RX ADMIN — METOPROLOL TARTRATE 5 MG: 5 INJECTION INTRAVENOUS at 20:24

## 2023-11-25 SDOH — SOCIAL STABILITY: SOCIAL INSECURITY: ARE THERE ANY APPARENT SIGNS OF INJURIES/BEHAVIORS THAT COULD BE RELATED TO ABUSE/NEGLECT?: NO

## 2023-11-25 SDOH — SOCIAL STABILITY: SOCIAL INSECURITY: ABUSE: ADULT

## 2023-11-25 SDOH — SOCIAL STABILITY: SOCIAL INSECURITY: ARE YOU OR HAVE YOU BEEN THREATENED OR ABUSED PHYSICALLY, EMOTIONALLY, OR SEXUALLY BY ANYONE?: NO

## 2023-11-25 SDOH — SOCIAL STABILITY: SOCIAL INSECURITY: DO YOU FEEL UNSAFE GOING BACK TO THE PLACE WHERE YOU ARE LIVING?: NO

## 2023-11-25 SDOH — SOCIAL STABILITY: SOCIAL INSECURITY: HAS ANYONE EVER THREATENED TO HURT YOUR FAMILY OR YOUR PETS?: NO

## 2023-11-25 SDOH — SOCIAL STABILITY: SOCIAL INSECURITY: DOES ANYONE TRY TO KEEP YOU FROM HAVING/CONTACTING OTHER FRIENDS OR DOING THINGS OUTSIDE YOUR HOME?: NO

## 2023-11-25 SDOH — SOCIAL STABILITY: SOCIAL INSECURITY: DO YOU FEEL ANYONE HAS EXPLOITED OR TAKEN ADVANTAGE OF YOU FINANCIALLY OR OF YOUR PERSONAL PROPERTY?: NO

## 2023-11-25 SDOH — SOCIAL STABILITY: SOCIAL INSECURITY: HAVE YOU HAD THOUGHTS OF HARMING ANYONE ELSE?: NO

## 2023-11-25 ASSESSMENT — PAIN SCALES - GENERAL
PAINLEVEL_OUTOF10: 6
PAINLEVEL_OUTOF10: 8
PAINLEVEL_OUTOF10: 8
PAINLEVEL_OUTOF10: 7
PAINLEVEL_OUTOF10: 10 - WORST POSSIBLE PAIN
PAINLEVEL_OUTOF10: 6
PAINLEVEL_OUTOF10: 6
PAINLEVEL_OUTOF10: 9
PAINLEVEL_OUTOF10: 5 - MODERATE PAIN
PAINLEVEL_OUTOF10: 9
PAINLEVEL_OUTOF10: 9
PAINLEVEL_OUTOF10: 8

## 2023-11-25 ASSESSMENT — ENCOUNTER SYMPTOMS
CONSTITUTIONAL NEGATIVE: 1
PSYCHIATRIC NEGATIVE: 1
HEMATOLOGIC/LYMPHATIC NEGATIVE: 1
FLANK PAIN: 1
NAUSEA: 1
ALLERGIC/IMMUNOLOGIC NEGATIVE: 1
EYES NEGATIVE: 1
RESPIRATORY NEGATIVE: 1
NEUROLOGICAL NEGATIVE: 1
ENDOCRINE NEGATIVE: 1
CARDIOVASCULAR NEGATIVE: 1

## 2023-11-25 ASSESSMENT — COGNITIVE AND FUNCTIONAL STATUS - GENERAL
HELP NEEDED FOR BATHING: A LITTLE
PERSONAL GROOMING: A LITTLE
PATIENT BASELINE BEDBOUND: YES
DRESSING REGULAR LOWER BODY CLOTHING: A LITTLE
DRESSING REGULAR UPPER BODY CLOTHING: A LITTLE
DAILY ACTIVITIY SCORE: 19
MOVING TO AND FROM BED TO CHAIR: TOTAL
MOBILITY SCORE: 8
MOVING FROM LYING ON BACK TO SITTING ON SIDE OF FLAT BED WITH BEDRAILS: A LOT
WALKING IN HOSPITAL ROOM: TOTAL
TURNING FROM BACK TO SIDE WHILE IN FLAT BAD: A LOT
STANDING UP FROM CHAIR USING ARMS: TOTAL
CLIMB 3 TO 5 STEPS WITH RAILING: TOTAL
TOILETING: A LITTLE

## 2023-11-25 ASSESSMENT — PAIN DESCRIPTION - ORIENTATION
ORIENTATION: LEFT
ORIENTATION: LEFT

## 2023-11-25 ASSESSMENT — PAIN - FUNCTIONAL ASSESSMENT
PAIN_FUNCTIONAL_ASSESSMENT: 0-10

## 2023-11-25 ASSESSMENT — PAIN DESCRIPTION - LOCATION
LOCATION: OTHER (COMMENT)
LOCATION: OTHER (COMMENT)

## 2023-11-25 ASSESSMENT — LIFESTYLE VARIABLES
SKIP TO QUESTIONS 9-10: 1
AUDIT-C TOTAL SCORE: 0
HOW MANY STANDARD DRINKS CONTAINING ALCOHOL DO YOU HAVE ON A TYPICAL DAY: PATIENT DOES NOT DRINK
HOW OFTEN DO YOU HAVE 6 OR MORE DRINKS ON ONE OCCASION: NEVER
HOW OFTEN DO YOU HAVE A DRINK CONTAINING ALCOHOL: NEVER
AUDIT-C TOTAL SCORE: 0

## 2023-11-25 ASSESSMENT — ACTIVITIES OF DAILY LIVING (ADL)
WALKS IN HOME: DEPENDENT
FEEDING YOURSELF: INDEPENDENT
ADEQUATE_TO_COMPLETE_ADL: YES
GROOMING: NEEDS ASSISTANCE
TOILETING: NEEDS ASSISTANCE
JUDGMENT_ADEQUATE_SAFELY_COMPLETE_DAILY_ACTIVITIES: YES
DRESSING YOURSELF: NEEDS ASSISTANCE
HEARING - LEFT EAR: FUNCTIONAL
HEARING - RIGHT EAR: FUNCTIONAL
PATIENT'S MEMORY ADEQUATE TO SAFELY COMPLETE DAILY ACTIVITIES?: YES
BATHING: NEEDS ASSISTANCE

## 2023-11-25 ASSESSMENT — PATIENT HEALTH QUESTIONNAIRE - PHQ9
SUM OF ALL RESPONSES TO PHQ9 QUESTIONS 1 & 2: 0
2. FEELING DOWN, DEPRESSED OR HOPELESS: NOT AT ALL
1. LITTLE INTEREST OR PLEASURE IN DOING THINGS: NOT AT ALL

## 2023-11-25 NOTE — POST-PROCEDURE NOTE
Interventional Radiology Brief Postprocedure Note    Attending: Thang    Assistant: None    Diagnosis: L hydronephrosis, sepsis    Description of procedure: L PCN, turbid urine aspirated, 10 ml representative specimen sent for analysis.    Anesthesia:  Local  1 mg Versed, 50 mcg Fentanyl    Complications: None    Estimated Blood Loss: minimal          See detailed result report with images in PACS.    The patient tolerated the procedure well without incident or complication and is in stable condition.
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
## 2023-11-25 NOTE — SIGNIFICANT EVENT
Vitals:    11/25/23 0403   BP: 92/60   Pulse: (!) 137   Resp:    Temp: 37.2 °C (99 °F)   SpO2: 91%    Prn lopressor given by staff.

## 2023-11-25 NOTE — PRE-PROCEDURE NOTE
Interventional Radiology Preprocedure Note    Indication for procedure: The primary encounter diagnosis was Kidney stone on left side. Diagnoses of Sepsis, due to unspecified organism, unspecified whether acute organ dysfunction present (CMS/HCC) and Acute pyelonephritis were also pertinent to this visit.    Relevant review of systems:  fatigue    Relevant Labs:   Lab Results   Component Value Date    CREATININE 1.54 (H) 11/25/2023    EGFR 54 (L) 11/25/2023    INR 1.3 (H) 05/22/2022    PROTIME 15.4 (H) 05/22/2022       Planned Sedation/Anesthesia: Moderate    Airway assessment:  tracheostomy    Directed physical examination:    Colostomy, tracheostomy    Mallampati:  tracheostomy    ASA Score: ASA 3 - Patient with moderate systemic disease with functional limitations    Benefits, risks and alternatives of procedure and planned sedation have been discussed with the patient and/or their representative. All questions answered and they agree to proceed.
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
## 2023-11-25 NOTE — PROGRESS NOTES
Pharmacy Medication History Review    Dionte Sharpe is a 53 y.o. male admitted for Kidney stone on left side. Pharmacy reviewed the patient's [id number] medications and allergies for accuracy.    The list below reflects the updated PTA list. Comments regarding how patient may be taking medications differently can be found in the Admit Orders Activity  Prior to Admission Medications   Prescriptions Last Dose Informant Patient Reported? Taking?   LORazepam (Ativan) 1 mg tablet  Self Yes Yes   Sig: Take 1 tablet (1 mg) by mouth every 6 hours if needed for anxiety.   Lactobacillus acidophilus capsule  Self Yes Yes   Sig: Take 1 capsule by mouth 2 times a day.   acetaminophen (Tylenol) 325 mg tablet  Self Yes Yes   Sig: Take 2 tablets (650 mg) by mouth every 4 hours if needed for mild pain (1 - 3) or fever (temp greater than 38.0 C).   amino acids/protein hydrolys (LIQUACEL ORAL)  Self Yes Yes   Sig: Take 30 mL by mouth 2 times a day.   apixaban (Eliquis) 5 mg tablet  Self Yes Yes   Sig: Take 1 tablet (5 mg) by mouth 2 times a day.   baclofen (Lioresal) 5 mg tablet  Self Yes Yes   Sig: Take 3 tablets (15 mg) by mouth every 6 hours if needed for muscle spasms.   budesonide (Pulmicort) 0.5 mg/2 mL nebulizer solution  Self Yes Yes   Sig: Take 2 mL (0.5 mg) by nebulization 2 times a day.   carbamide peroxide (Debrox) 6.5 % otic solution  Self Yes Yes   Sig: Administer 5 drops into the right ear 2 times a day.   cholecalciferol (Vitamin D-3) 5,000 Units tablet  Self Yes Yes   Sig: Take 1 tablet (5,000 Units) by mouth. Every Tuesday and Thursday   cyclobenzaprine (Flexeril) 10 mg tablet  Self Yes Yes   Sig: Take 0.5 tablets (5 mg) by mouth 2 times a day as needed for muscle spasms.   dextromethorphan-guaifenesin (Robitussin DM)  mg/5 mL oral liquid  Self Yes Yes   Sig: Take 10 mL by mouth every 4 hours if needed for cough.   folic acid (Folvite) 1 mg tablet  Self Yes Yes   Sig: Take 1 tablet (1 mg) by mouth once  daily.   hydrOXYzine pamoate (Vistaril) 25 mg capsule  Self Yes Yes   Sig: Take 1 capsule (25 mg) by mouth every 6 hours if needed for anxiety.   hydrocortisone 1 % gel  Self Yes Yes   Sig: Apply 1 Application topically. Every shift to right hand   ibuprofen 600 mg tablet  Self Yes Yes   Sig: Take 1 tablet (600 mg) by mouth every 6 hours if needed for mild pain (1 - 3).   ipratropium-albuteroL (Duo-Neb) 0.5-2.5 mg/3 mL nebulizer solution  Self Yes Yes   Sig: Inhale 3 mL every 2 hours if needed for shortness of breath.   lacosamide (Vimpat) 100 mg tablet  Self Yes Yes   Sig: Take 1 tablet (100 mg) by mouth twice a day.   levETIRAcetam (Keppra) 750 mg tablet  Self Yes Yes   Sig: Take 1 tablet (750 mg) by mouth 2 times a day.   loratadine (Claritin) 5 mg chewable tablet  Self Yes Yes   Sig: Chew 1 tablet (5 mg) once daily.   metFORMIN (Glucophage) 500 mg tablet  Self Yes Yes   Sig: Take 1 tablet (500 mg) by mouth once daily.   mineral oil-hydrophilic petrolatum (Aquaphor) ointment  Self Yes Yes   Sig: Apply 1 Application topically if needed for dry skin (every shift to left buttock).   oxyCODONE-acetaminophen (Percocet) 5-325 mg tablet  Self Yes Yes   Sig: Take 1 tablet by mouth 2 times a day.   oxyCODONE-acetaminophen (Percocet) 5-325 mg tablet  Self Yes Yes   Sig: Take 1 tablet by mouth every 8 hours if needed for severe pain (7 - 10) (breakthrough pain).   sertraline (Zoloft) 50 mg tablet  Self Yes Yes   Sig: Take 1 tablet (50 mg) by mouth once daily.      Facility-Administered Medications: None        The list below reflects the updated allergy list. Please review each documented allergy for additional clarification and justification.  Allergies  Reviewed by Ella Graham, PharmD on 11/25/2023   No Known Allergies         Patient was unable to be assessed for M2B at discharge. Pharmacy has been updated to N/A.    Sources used to complete the med history include   [facility] Order Summary  Report generated 11/25/23    Below are additional concerns with the patient's PTA list.  None      Ella Graham, Dea   Transitions of Care Pharmacist  [facility]  Please reach out via Secure Chat for questions, or if no response call [facility] or [facility]

## 2023-11-25 NOTE — NURSING NOTE
Report called to floor RN Will. Patient has left nephrostomy tube placed. Sutured in placed and dressing of m-fix and tegaderm - attached to drainage bag. Serosanguinous urine draining. Patient in stable condition. Patient put in for transport to floor.     Lab specimen delivered to lab by this RN.

## 2023-11-26 ENCOUNTER — APPOINTMENT (OUTPATIENT)
Dept: RADIOLOGY | Facility: HOSPITAL | Age: 53
End: 2023-11-26
Payer: COMMERCIAL

## 2023-11-26 LAB
ANION GAP SERPL CALC-SCNC: 11 MMOL/L (ref 10–20)
BUN SERPL-MCNC: 25 MG/DL (ref 6–23)
CALCIUM SERPL-MCNC: 7.5 MG/DL (ref 8.6–10.3)
CHLORIDE SERPL-SCNC: 100 MMOL/L (ref 98–107)
CO2 SERPL-SCNC: 29 MMOL/L (ref 21–32)
CREAT SERPL-MCNC: 1.51 MG/DL (ref 0.5–1.3)
ERYTHROCYTE [DISTWIDTH] IN BLOOD BY AUTOMATED COUNT: 18.5 % (ref 11.5–14.5)
GFR SERPL CREATININE-BSD FRML MDRD: 55 ML/MIN/1.73M*2
GLUCOSE BLD MANUAL STRIP-MCNC: 160 MG/DL (ref 74–99)
GLUCOSE BLD MANUAL STRIP-MCNC: 173 MG/DL (ref 74–99)
GLUCOSE BLD MANUAL STRIP-MCNC: 207 MG/DL (ref 74–99)
GLUCOSE SERPL-MCNC: 161 MG/DL (ref 74–99)
HCT VFR BLD AUTO: 42.9 % (ref 41–52)
HGB BLD-MCNC: 12.2 G/DL (ref 13.5–17.5)
MCH RBC QN AUTO: 22 PG (ref 26–34)
MCHC RBC AUTO-ENTMCNC: 28.4 G/DL (ref 32–36)
MCV RBC AUTO: 77 FL (ref 80–100)
NRBC BLD-RTO: 0 /100 WBCS (ref 0–0)
PLATELET # BLD AUTO: 209 X10*3/UL (ref 150–450)
POTASSIUM SERPL-SCNC: 4.3 MMOL/L (ref 3.5–5.3)
RBC # BLD AUTO: 5.54 X10*6/UL (ref 4.5–5.9)
SODIUM SERPL-SCNC: 136 MMOL/L (ref 136–145)
WBC # BLD AUTO: 25.4 X10*3/UL (ref 4.4–11.3)

## 2023-11-26 PROCEDURE — 2500000001 HC RX 250 WO HCPCS SELF ADMINISTERED DRUGS (ALT 637 FOR MEDICARE OP): Performed by: NURSE PRACTITIONER

## 2023-11-26 PROCEDURE — 2500000002 HC RX 250 W HCPCS SELF ADMINISTERED DRUGS (ALT 637 FOR MEDICARE OP, ALT 636 FOR OP/ED): Performed by: NURSE PRACTITIONER

## 2023-11-26 PROCEDURE — 80048 BASIC METABOLIC PNL TOTAL CA: CPT | Performed by: NURSE PRACTITIONER

## 2023-11-26 PROCEDURE — 85027 COMPLETE CBC AUTOMATED: CPT | Performed by: NURSE PRACTITIONER

## 2023-11-26 PROCEDURE — 94640 AIRWAY INHALATION TREATMENT: CPT

## 2023-11-26 PROCEDURE — 2500000004 HC RX 250 GENERAL PHARMACY W/ HCPCS (ALT 636 FOR OP/ED): Performed by: NURSE PRACTITIONER

## 2023-11-26 PROCEDURE — 36415 COLL VENOUS BLD VENIPUNCTURE: CPT | Performed by: NURSE PRACTITIONER

## 2023-11-26 PROCEDURE — 87040 BLOOD CULTURE FOR BACTERIA: CPT | Mod: PARLAB | Performed by: NURSE PRACTITIONER

## 2023-11-26 PROCEDURE — 93971 EXTREMITY STUDY: CPT

## 2023-11-26 PROCEDURE — 82947 ASSAY GLUCOSE BLOOD QUANT: CPT

## 2023-11-26 PROCEDURE — 2500000004 HC RX 250 GENERAL PHARMACY W/ HCPCS (ALT 636 FOR OP/ED): Performed by: INTERNAL MEDICINE

## 2023-11-26 PROCEDURE — 93971 EXTREMITY STUDY: CPT | Performed by: STUDENT IN AN ORGANIZED HEALTH CARE EDUCATION/TRAINING PROGRAM

## 2023-11-26 PROCEDURE — 2060000001 HC INTERMEDIATE ICU ROOM DAILY

## 2023-11-26 RX ADMIN — FOLIC ACID 1 MG: 1 TABLET ORAL at 08:53

## 2023-11-26 RX ADMIN — OXYCODONE HYDROCHLORIDE AND ACETAMINOPHEN 1 TABLET: 5; 325 TABLET ORAL at 01:19

## 2023-11-26 RX ADMIN — INSULIN LISPRO 1 UNITS: 100 INJECTION, SOLUTION INTRAVENOUS; SUBCUTANEOUS at 12:40

## 2023-11-26 RX ADMIN — HYDROCORTISONE: 10 LOTION TOPICAL at 20:46

## 2023-11-26 RX ADMIN — OXYCODONE HYDROCHLORIDE AND ACETAMINOPHEN 1 TABLET: 5; 325 TABLET ORAL at 20:43

## 2023-11-26 RX ADMIN — PIPERACILLIN SODIUM AND TAZOBACTAM SODIUM 3.38 G: 3; .375 INJECTION, SOLUTION INTRAVENOUS at 12:29

## 2023-11-26 RX ADMIN — DOCUSATE SODIUM 100 MG: 100 CAPSULE, LIQUID FILLED ORAL at 08:53

## 2023-11-26 RX ADMIN — DOCUSATE SODIUM 100 MG: 100 CAPSULE, LIQUID FILLED ORAL at 20:44

## 2023-11-26 RX ADMIN — INSULIN LISPRO 1 UNITS: 100 INJECTION, SOLUTION INTRAVENOUS; SUBCUTANEOUS at 08:59

## 2023-11-26 RX ADMIN — CYCLOBENZAPRINE 5 MG: 10 TABLET, FILM COATED ORAL at 06:57

## 2023-11-26 RX ADMIN — Medication 1 TABLET: at 20:44

## 2023-11-26 RX ADMIN — LEVETIRACETAM 750 MG: 500 TABLET, FILM COATED ORAL at 08:53

## 2023-11-26 RX ADMIN — PANTOPRAZOLE SODIUM 40 MG: 40 TABLET, DELAYED RELEASE ORAL at 09:01

## 2023-11-26 RX ADMIN — SODIUM CHLORIDE 100 ML/HR: 9 INJECTION, SOLUTION INTRAVENOUS at 06:01

## 2023-11-26 RX ADMIN — BUDESONIDE 0.5 MG: 0.5 INHALANT ORAL at 08:06

## 2023-11-26 RX ADMIN — LORATADINE 5 MG: 10 TABLET ORAL at 08:52

## 2023-11-26 RX ADMIN — Medication 1 TABLET: at 08:52

## 2023-11-26 RX ADMIN — LACOSAMIDE 100 MG: 100 TABLET, FILM COATED ORAL at 08:52

## 2023-11-26 RX ADMIN — Medication 5 DROP: at 20:46

## 2023-11-26 RX ADMIN — OXYCODONE HYDROCHLORIDE AND ACETAMINOPHEN 1 TABLET: 5; 325 TABLET ORAL at 17:10

## 2023-11-26 RX ADMIN — IPRATROPIUM BROMIDE AND ALBUTEROL SULFATE 3 ML: 2.5; .5 SOLUTION RESPIRATORY (INHALATION) at 19:29

## 2023-11-26 RX ADMIN — LEVETIRACETAM 750 MG: 500 TABLET, FILM COATED ORAL at 20:43

## 2023-11-26 RX ADMIN — CYCLOBENZAPRINE 5 MG: 10 TABLET, FILM COATED ORAL at 18:53

## 2023-11-26 RX ADMIN — PIPERACILLIN SODIUM AND TAZOBACTAM SODIUM 3.38 G: 3; .375 INJECTION, SOLUTION INTRAVENOUS at 05:59

## 2023-11-26 RX ADMIN — Medication 5 DROP: at 10:42

## 2023-11-26 RX ADMIN — SERTRALINE HYDROCHLORIDE 50 MG: 50 TABLET ORAL at 08:52

## 2023-11-26 RX ADMIN — PIPERACILLIN SODIUM AND TAZOBACTAM SODIUM 3.38 G: 3; .375 INJECTION, SOLUTION INTRAVENOUS at 17:06

## 2023-11-26 RX ADMIN — HYDROCORTISONE 1 APPLICATION: 10 LOTION TOPICAL at 08:53

## 2023-11-26 RX ADMIN — INSULIN LISPRO 2 UNITS: 100 INJECTION, SOLUTION INTRAVENOUS; SUBCUTANEOUS at 17:02

## 2023-11-26 RX ADMIN — LACOSAMIDE 100 MG: 100 TABLET, FILM COATED ORAL at 20:43

## 2023-11-26 ASSESSMENT — PAIN - FUNCTIONAL ASSESSMENT
PAIN_FUNCTIONAL_ASSESSMENT: 0-10

## 2023-11-26 ASSESSMENT — PAIN SCALES - GENERAL
PAINLEVEL_OUTOF10: 9
PAINLEVEL_OUTOF10: 5 - MODERATE PAIN
PAINLEVEL_OUTOF10: 3
PAINLEVEL_OUTOF10: 8

## 2023-11-26 NOTE — PROGRESS NOTES
Physical Therapy    Discharge Summary    Name: Dionte Sharpe  MRN: [id number]  : 1970  Date: 23    Discharge Summary: PT    Discharge Reason: Spoke with pt regarding prior level of function. Pt states he is from [facility] and is bedbound at baseline. Pt states staff have attempted to get him into a WC in the past, however have used a antonina lift to do so. He is also total assist for all ADLs and states he usually does not get dressed at baseline. Pt is at his baseline and does not require skilled PT at this time.

## 2023-11-26 NOTE — PROGRESS NOTES
"Dionte Sharpe is a 53 y.o. male on day 1 of admission presenting with Kidney stone on left side.    Subjective   F/U Left obstructing calculus, bilateral renal calculi, bladder calculus, UTI and sepsis  -IR place Perc NT yesterday  -Feeling better but WBC still elevated       Objective     Physical Exam  Perc NT jada color urine  Last Recorded Vitals  Blood pressure 107/71, pulse (!) 111, temperature 35.8 °C (96.4 °F), temperature source Temporal, resp. rate 18, height 1.803 m (5' 11\"), weight 99.8 kg (220 lb), SpO2 95 %.  Intake/Output last 3 Shifts:  I/O last 3 completed shifts:  In: 1050 (10.5 mL/kg) [IV Piggyback:1050]  Out: 1325 (13.3 mL/kg) [Urine:1325 (0.4 mL/kg/hr)]  Weight: 99.8 kg     Relevant Results  Scheduled medications  [Held by provider] apixaban, 5 mg, oral, BID  budesonide, 0.5 mg, nebulization, BID  carbamide peroxide, 5 drop, Right Ear, BID  [START ON 11/28/2023] cholecalciferol, 5,000 Units, oral, Once per day on Tue Thu  docusate sodium, 100 mg, oral, BID  esomeprazole, 40 mg, nasoduodenal tube, Daily before breakfast   Or  pantoprazole, 40 mg, oral, Daily before breakfast  folic acid, 1 mg, oral, Daily  hydrocortisone, , Topical, BID  insulin lispro, 0-5 Units, subcutaneous, TID with meals  iohexol, 10 mL, miscellaneous, Once in imaging  lacosamide, 100 mg, oral, q12h TAE  lactobacillus acidophilus, 1 tablet, oral, BID  levETIRAcetam, 750 mg, oral, BID  loratadine, 5 mg, oral, Daily  oxyCODONE-acetaminophen, 1 tablet, oral, BID  piperacillin-tazobactam, 3.375 g, intravenous, q6h  sertraline, 50 mg, oral, Daily      Continuous medications  sodium chloride 0.9%, 100 mL/hr, Last Rate: 100 mL/hr (11/26/23 0601)      PRN medications  PRN medications: acetaminophen, baclofen, cyclobenzaprine, dextromethorphan-guaifenesin, dextrose 10 % in water (D10W), dextrose, glucagon, hydrOXYzine pamoate, [Held by provider] ibuprofen, ipratropium-albuteroL, LORazepam, mineral oil-hydrophilic petrolatum, " Chief complaint:   Chief Complaint   Patient presents with   • Refill Request   • Menstrual Problem     Irregular menses     Vitals:  Visit Vitals   • /78   • Ht 5' 2\" (1.575 m)   • Wt 83 kg   • LMP 02/23/2017 (Exact Date)   • BMI 33.47 kg/m2       HISTORY OF PRESENT ILLNESS     HPI : Patient in for refill of her Adderall and Vyvanse. She uses the Adderall at 5 mg she which she takes between 2 and 3:00 the afternoon which helps her get through her last classes and her homework and wears out quickly enough that she can sleep at night. The Vyvanse she takes in the morning. This working well she's had no nausea or weight loss palpitations or diarrhea.    Other significant problems:  Patient Active Problem List    Diagnosis Date Noted   • Adult ADHD 09/30/2016     Priority: Low   • Obesity, unspecified 06/04/2013     Priority: Low       PAST MEDICAL, FAMILY AND SOCIAL HISTORY     Medications:  Current Outpatient Prescriptions   Medication   • lisdexamfetamine (VYVANSE) 20 MG capsule   • desogestrel-ethinyl estradiol (AZURETTE) 0.15-0.02/0.01 MG (21/5) per tablet   • amphetamine-dextroamphetamine (ADDERALL) 5 MG tablet     No current facility-administered medications for this visit.        Allergies:  ALLERGIES:  No Known Allergies    Past Medical  History/Surgeries:  Past Medical History:   Diagnosis Date   • Abnormal Pap smear    • Anemia    • Anxiety and depression    • Depressive disorder    • Obesity        Past Surgical History:   Procedure Laterality Date   • PAP SMEAR,ROUTINE  09/07/2011       Family History:  Family History   Problem Relation Age of Onset   • Heart disease Paternal Grandmother    • Diabetes Paternal Grandmother    • Hypertension Paternal Grandmother    • Heart disease Maternal Grandfather    • Diabetes Maternal Grandfather    • Hypertension Maternal Grandfather    • Heart disease Mother    • Heart disease Paternal Grandfather    • Rashes/Skin Problems Son        Social History:  Social  History   Substance Use Topics   • Smoking status: Former Smoker   • Smokeless tobacco: Never Used   • Alcohol use 8.4 oz/week     14 Standard drinks or equivalent per week       REVIEW OF SYSTEMS     Review of Systems : Patient is had a upset in her menstruation. She uses the birth control pill and has not actually missed any pills.  She was late on the pill she took 2 together the day she realized it and started plan the late February. After she finished plan B she began the bleed vaginally and it's bled for a total of 3 weeks. Since then she's not had her normal bleeding at the normal time. She has however kept on taking her birth control pill exactly as was prescribed.     PHYSICAL EXAM     Physical Exam :  Vital signs as charted.    ENT: Funduscopic benign. PERRLA.  Chest: Lungs CTA. Heart regular without murmur.  Extremities: No pedal edema. PT pulse 2+ bilaterally.    ASSESSMENT/PLAN     1. ADHD doing well on her current doses of the Vyvanse in the morning and the Adderall in the afternoon.  2. Disruption of her menstrual cycles with the addition of plan B after being late on the birth control pill. Today's afternoon urine pregnancy test was negative. Continue taking her BCPs as directed.    Seen under the supervision of Toñito Anthony MD      ondansetron ODT **OR** ondansetron, oxyCODONE-acetaminophen    Results for orders placed or performed during the hospital encounter of 11/24/23 (from the past 24 hour(s))   POCT GLUCOSE   Result Value Ref Range    POCT Glucose 165 (H) 74 - 99 mg/dL   Sterile Fluid Culture/Smear    Specimen: Aspirate; Fluid   Result Value Ref Range    Gram Stain (4+) Abundant Polymorphonuclear leukocytes (A)     Gram Stain (4+) Abundant Gram negative bacilli (A)    Protime-INR   Result Value Ref Range    Protime 14.4 (H) 9.8 - 12.8 seconds    INR 1.3 (H) 0.9 - 1.1   POCT GLUCOSE   Result Value Ref Range    POCT Glucose 161 (H) 74 - 99 mg/dL   POCT GLUCOSE   Result Value Ref Range    POCT Glucose 193 (H) 74 - 99 mg/dL   POCT GLUCOSE   Result Value Ref Range    POCT Glucose 190 (H) 74 - 99 mg/dL   POCT GLUCOSE   Result Value Ref Range    POCT Glucose 173 (H) 74 - 99 mg/dL   CBC   Result Value Ref Range    WBC 25.4 (H) 4.4 - 11.3 x10*3/uL    nRBC 0.0 0.0 - 0.0 /100 WBCs    RBC 5.54 4.50 - 5.90 x10*6/uL    Hemoglobin 12.2 (L) 13.5 - 17.5 g/dL    Hematocrit 42.9 41.0 - 52.0 %    MCV 77 (L) 80 - 100 fL    MCH 22.0 (L) 26.0 - 34.0 pg    MCHC 28.4 (L) 32.0 - 36.0 g/dL    RDW 18.5 (H) 11.5 - 14.5 %    Platelets 209 150 - 450 x10*3/uL   Basic metabolic panel   Result Value Ref Range    Glucose 161 (H) 74 - 99 mg/dL    Sodium 136 136 - 145 mmol/L    Potassium 4.3 3.5 - 5.3 mmol/L    Chloride 100 98 - 107 mmol/L    Bicarbonate 29 21 - 32 mmol/L    Anion Gap 11 10 - 20 mmol/L    Urea Nitrogen 25 (H) 6 - 23 mg/dL    Creatinine 1.51 (H) 0.50 - 1.30 mg/dL    eGFR 55 (L) >60 mL/min/1.73m*2    Calcium 7.5 (L) 8.6 - 10.3 mg/dL       IR placement of nephrostomy catheter    Result Date: 11/25/2023  Interpreted By:  Omid Desir, STUDY: IR GUï¿½NEPHROSTOMY PLACEMENT; ;  11/25/2023 10:55 am 1. LEFT ANTEGRADE NEPHROSTOGRAM 2. ULTRASOUND FLUOROSCOPICALLY GUIDED LEFT PERCUTANEOUS NEPHROSTOMY     INDICATION: Signs/Symptoms:obstructing and infected L  ureteral stone, hoping for perc nephrostomy. 53-year-old man with left ureterolithiasis, sepsis.   COMPARISON: CT abdomen pelvis 11/24/2023   ACCESSION NUMBER(S): [id number]   ORDERING CLINICIAN: OSEI KUO   TECHNIQUE:   ATTENDING : Omid Desir M.D.   TECHNICAL DESCRIPTION/FINDINGS: The procedure, including all risks, benefits and alternatives were explained to the patient in detail. All questions were answered and written informed consent was obtained.   The patient was positioned prone on the fluoroscopy table. A time-out was performed.   The left flank was prepped and draped in usual sterile fashion. Focused ultrasound was performed of the left kidney demonstrating mild to moderate left hydronephrosis. Ultrasound images were saved. 1% lidocaine was administered subcutaneously for local anesthesia. Under real-time ultrasound guidance, a 21 gauge access needle was advanced into a dilated left renal calyx. Ultrasound images were saved. Removal of the stylet yielded return of turbid urine. Dilute contrast was injected opacifying the moderately dilated left renal collecting system. Under fluoroscopic visualization, an 018 guidewire was advanced into the upstream aspect of the left ureter and a coaxial introducer was utilized for placement of a parallel 035 working wire. After serial dilation over the 035 wire, a 10 Bulgarian pigtail drainage catheter functioning as a nephrostomy tube was placed. The cope retention loop was formed in the central left renal collecting system. Completion contrast injection confirms appropriate positioning.   A 10 cc representative specimen of turbid urine was then collected for microbiological analysis. The newly placed nephrostomy tube was sutured to the skin with 2 0 Prolene stay suture connected to gravity bag drainage. Sterile dressing was applied.   SEDATION/MEDICATIONS: Continuous cardiopulmonary monitoring was performed by a radiology nurse for the duration of  the procedure.  1 mg Versed and   50 mcg Fentanyl were administered for moderate conscious sedation time of 30 minutes.      10 cc 1% Lidocaine was administered subcutaneously for local anesthesia. SPECIMENS: 10 cc turbid urine aspirated from the left renal collecting system after nephrostomy tube placement, sent for microbiological analysis. ESTIMATED BLOOD LOSS:  5 cc FLOUROSCOPY:  0.9 minutes; DAP  327.86 uGy-cm*2; Air Kerma  20.0 mGy CONTRAST:   FINDINGS: Test       1. Mild to moderate left hydronephrosis with nephro and ureterolithiasis present. 2. Ultrasound fluoroscopically guided left percutaneous nephrostomy, to gravity drainage. A 10 cc representative specimen of turbid urine was collected for microbiological analysis.     MACRO: None   Signed by: Omid Desir 11/25/2023 12:07 PM Dictation workstation:   [id number]    CT abdomen pelvis w IV contrast    Result Date: 11/24/2023  Interpreted By:  Trev Michaud, STUDY: CT ABDOMEN PELVIS W IV CONTRAST;  11/24/2023 8:58 pm   INDICATION: Signs/Symptoms:LLQ pain, eval for stone vs diverticular disease.   COMPARISON: 02/10/2023   ACCESSION NUMBER(S): [id number]   ORDERING CLINICIAN: KALEB KING   TECHNIQUE: CT of the abdomen and pelvis was performed. Contiguous axial images were obtained at 3 mm slice thickness through the abdomen and pelvis. Coronal and sagittal reconstructions at 3 mm slice thickness were performed.  Intravenous contrast administered   FINDINGS: Body habitus limits sensitivity. Small lung volumes with vascular crowding the lung bases. I can not exclude mild edema. Correlation with BNP is advised.   Fatty infiltration of the liver. Unremarkable pancreas. Stable spleen. Multiple bilateral nephroliths. Percutaneous nephrostomy tubes have been removed. Left greater than right enhancement of the collecting system. Certainly infection not excluded. There is a 7 mm left proximal ureteral stone detected causing upstream hydronephrosis.  Clustered calcifications seen in the left kidney. Reference left lower pole 1.1 cm, left lower pole 1.7 cm, left midpole 1.8 cm clustered calcification. Additional calcifications seen in the upper poles. Multiple right-sided stones are also seen with the largest clustered measuring 3 x 1.1 cm. No hydronephrosis seen on the right. There is a bladder stone measuring 1.8 x 1.7 cm. Significant rectus diastasis again noted. Prostate gland is heterogeneous. Urinary bladder not particularly distended. No pelvic adenopathy. Scoliosis and mild multilevel degenerative disc disease.       1.  Abnormal examination. Body habitus limits sensitivity. Features may reflect pulmonary edema with vascular crowding in the lung bases. Correlation with volume status advised. 2. Numerous bilateral large renal cysts as well as a large bladder stone. There is moderate left-sided proximal hydronephrosis and hydroureter with an obstructing 7 mm stone. No perinephric fluid collections.     MACRO: None   Signed by: Trev Michaud 11/24/2023 9:54 PM Dictation workstation:   [id number]           This patient currently has cardiac telemetry ordered; if you would like to modify or discontinue the telemetry order, click here to go to the orders activity to modify/discontinue the order.                 Assessment/Plan   Principal Problem:    Kidney stone on left side    53 yom Left obstructing calculus, bilateral renal calculi, bladder calculus, UTI and sepsis  -Perc NT Clear jada urine  -Continue Abx awaiting culture  -He will need Definitive treatment planning which will be done as an outpatient       I spent 15 minutes in the professional and overall care of this patient.      Corbin Gallo PA-C

## 2023-11-26 NOTE — CARE PLAN
The patient's goals for the shift include        Problem: Skin  Goal: Decreased wound size/increased tissue granulation at next dressing change  Outcome: Progressing  Goal: Prevent/manage excess moisture  Outcome: Progressing  Goal: Prevent/minimize sheer/friction injuries  Outcome: Progressing

## 2023-11-27 ENCOUNTER — APPOINTMENT (OUTPATIENT)
Dept: RADIOLOGY | Facility: HOSPITAL | Age: 53
End: 2023-11-27
Payer: COMMERCIAL

## 2023-11-27 LAB
APPEARANCE UR: ABNORMAL
BACTERIA BLD AEROBE CULT: ABNORMAL
BACTERIA BLD AEROBE CULT: ABNORMAL
BACTERIA BLD CULT: ABNORMAL
BACTERIA BLD CULT: ABNORMAL
BILIRUB UR STRIP.AUTO-MCNC: NEGATIVE MG/DL
BNP SERPL-MCNC: 27 PG/ML (ref 0–99)
CHLORIDE UR-SCNC: 59 MMOL/L
CHLORIDE/CREATININE (MMOL/G) IN URINE: 138 MMOL/G CREAT (ref 23–275)
COLOR UR: YELLOW
CREAT UR-MCNC: 42.8 MG/DL (ref 20–370)
GLUCOSE BLD MANUAL STRIP-MCNC: 186 MG/DL (ref 74–99)
GLUCOSE BLD MANUAL STRIP-MCNC: 221 MG/DL (ref 74–99)
GLUCOSE BLD MANUAL STRIP-MCNC: 257 MG/DL (ref 74–99)
GLUCOSE UR STRIP.AUTO-MCNC: NEGATIVE MG/DL
GRAM STN SPEC: ABNORMAL
KETONES UR STRIP.AUTO-MCNC: NEGATIVE MG/DL
LEUKOCYTE ESTERASE UR QL STRIP.AUTO: ABNORMAL
MICROALBUMIN UR-MCNC: 602 MG/L
MICROALBUMIN/CREAT UR: 1406.5 UG/MG CREAT
MUCOUS THREADS #/AREA URNS AUTO: ABNORMAL /LPF
NITRITE UR QL STRIP.AUTO: NEGATIVE
PH UR STRIP.AUTO: 6 [PH]
POTASSIUM UR-SCNC: 25 MMOL/L
POTASSIUM/CREAT UR-RTO: 58 MMOL/G CREAT
PROT UR STRIP.AUTO-MCNC: ABNORMAL MG/DL
RBC # UR STRIP.AUTO: ABNORMAL /UL
RBC #/AREA URNS AUTO: >20 /HPF
SODIUM UR-SCNC: 58 MMOL/L
SODIUM/CREAT UR-RTO: 136 MMOL/G CREAT
SP GR UR STRIP.AUTO: 1.01
URATE SERPL-MCNC: 2.9 MG/DL (ref 4–7.5)
UREA/CREAT UR-SRTO: 8 G/G CREAT
UROBILINOGEN UR STRIP.AUTO-MCNC: <2 MG/DL
UUN UR-MCNC: 341 MG/DL
WBC #/AREA URNS AUTO: >50 /HPF
WBC CLUMPS #/AREA URNS AUTO: ABNORMAL /HPF

## 2023-11-27 PROCEDURE — 36415 COLL VENOUS BLD VENIPUNCTURE: CPT | Performed by: INTERNAL MEDICINE

## 2023-11-27 PROCEDURE — 84550 ASSAY OF BLOOD/URIC ACID: CPT | Performed by: INTERNAL MEDICINE

## 2023-11-27 PROCEDURE — 94640 AIRWAY INHALATION TREATMENT: CPT

## 2023-11-27 PROCEDURE — 81001 URINALYSIS AUTO W/SCOPE: CPT | Performed by: INTERNAL MEDICINE

## 2023-11-27 PROCEDURE — 76770 US EXAM ABDO BACK WALL COMP: CPT

## 2023-11-27 PROCEDURE — 2500000001 HC RX 250 WO HCPCS SELF ADMINISTERED DRUGS (ALT 637 FOR MEDICARE OP): Performed by: NURSE PRACTITIONER

## 2023-11-27 PROCEDURE — 2500000004 HC RX 250 GENERAL PHARMACY W/ HCPCS (ALT 636 FOR OP/ED): Performed by: INTERNAL MEDICINE

## 2023-11-27 PROCEDURE — 82947 ASSAY GLUCOSE BLOOD QUANT: CPT

## 2023-11-27 PROCEDURE — 84540 ASSAY OF URINE/UREA-N: CPT | Performed by: INTERNAL MEDICINE

## 2023-11-27 PROCEDURE — 83935 ASSAY OF URINE OSMOLALITY: CPT | Mod: PARLAB | Performed by: INTERNAL MEDICINE

## 2023-11-27 PROCEDURE — 76770 US EXAM ABDO BACK WALL COMP: CPT | Performed by: RADIOLOGY

## 2023-11-27 PROCEDURE — 83880 ASSAY OF NATRIURETIC PEPTIDE: CPT | Performed by: INTERNAL MEDICINE

## 2023-11-27 PROCEDURE — 2500000002 HC RX 250 W HCPCS SELF ADMINISTERED DRUGS (ALT 637 FOR MEDICARE OP, ALT 636 FOR OP/ED): Performed by: NURSE PRACTITIONER

## 2023-11-27 PROCEDURE — 82436 ASSAY OF URINE CHLORIDE: CPT | Performed by: INTERNAL MEDICINE

## 2023-11-27 PROCEDURE — 2060000001 HC INTERMEDIATE ICU ROOM DAILY

## 2023-11-27 PROCEDURE — 82043 UR ALBUMIN QUANTITATIVE: CPT | Performed by: INTERNAL MEDICINE

## 2023-11-27 PROCEDURE — 2500000004 HC RX 250 GENERAL PHARMACY W/ HCPCS (ALT 636 FOR OP/ED): Performed by: NURSE PRACTITIONER

## 2023-11-27 RX ORDER — DEXTROSE, SODIUM CHLORIDE, SODIUM LACTATE, POTASSIUM CHLORIDE, AND CALCIUM CHLORIDE 5; .6; .31; .03; .02 G/100ML; G/100ML; G/100ML; G/100ML; G/100ML
75 INJECTION, SOLUTION INTRAVENOUS CONTINUOUS
Status: DISCONTINUED | OUTPATIENT
Start: 2023-11-27 | End: 2023-12-06 | Stop reason: HOSPADM

## 2023-11-27 RX ORDER — LANOLIN ALCOHOL/MO/W.PET/CERES
100 CREAM (GRAM) TOPICAL DAILY
Status: DISCONTINUED | OUTPATIENT
Start: 2023-11-27 | End: 2023-12-06 | Stop reason: HOSPADM

## 2023-11-27 RX ADMIN — Medication 5 DROP: at 09:48

## 2023-11-27 RX ADMIN — LACOSAMIDE 100 MG: 100 TABLET, FILM COATED ORAL at 09:45

## 2023-11-27 RX ADMIN — PIPERACILLIN SODIUM AND TAZOBACTAM SODIUM 3.38 G: 3; .375 INJECTION, SOLUTION INTRAVENOUS at 17:30

## 2023-11-27 RX ADMIN — LEVETIRACETAM 750 MG: 500 TABLET, FILM COATED ORAL at 09:43

## 2023-11-27 RX ADMIN — BUDESONIDE 0.5 MG: 0.5 INHALANT ORAL at 20:58

## 2023-11-27 RX ADMIN — BUDESONIDE 0.5 MG: 0.5 INHALANT ORAL at 07:20

## 2023-11-27 RX ADMIN — SODIUM CHLORIDE, SODIUM LACTATE, POTASSIUM CHLORIDE, CALCIUM CHLORIDE AND DEXTROSE MONOHYDRATE 75 ML/HR: 5; 600; 310; 30; 20 INJECTION, SOLUTION INTRAVENOUS at 17:30

## 2023-11-27 RX ADMIN — INSULIN LISPRO 2 UNITS: 100 INJECTION, SOLUTION INTRAVENOUS; SUBCUTANEOUS at 13:24

## 2023-11-27 RX ADMIN — DOCUSATE SODIUM 100 MG: 100 CAPSULE, LIQUID FILLED ORAL at 09:44

## 2023-11-27 RX ADMIN — PIPERACILLIN SODIUM AND TAZOBACTAM SODIUM 3.38 G: 3; .375 INJECTION, SOLUTION INTRAVENOUS at 13:24

## 2023-11-27 RX ADMIN — DOCUSATE SODIUM 100 MG: 100 CAPSULE, LIQUID FILLED ORAL at 22:10

## 2023-11-27 RX ADMIN — OXYCODONE HYDROCHLORIDE AND ACETAMINOPHEN 1 TABLET: 5; 325 TABLET ORAL at 22:09

## 2023-11-27 RX ADMIN — LEVETIRACETAM 750 MG: 500 TABLET, FILM COATED ORAL at 22:10

## 2023-11-27 RX ADMIN — PIPERACILLIN SODIUM AND TAZOBACTAM SODIUM 3.38 G: 3; .375 INJECTION, SOLUTION INTRAVENOUS at 00:00

## 2023-11-27 RX ADMIN — LACOSAMIDE 100 MG: 100 TABLET, FILM COATED ORAL at 22:09

## 2023-11-27 RX ADMIN — SERTRALINE HYDROCHLORIDE 50 MG: 50 TABLET ORAL at 09:44

## 2023-11-27 RX ADMIN — FOLIC ACID 1 MG: 1 TABLET ORAL at 09:44

## 2023-11-27 RX ADMIN — LORATADINE 5 MG: 10 TABLET ORAL at 09:44

## 2023-11-27 RX ADMIN — Medication 1 TABLET: at 09:43

## 2023-11-27 RX ADMIN — Medication 5 DROP: at 22:11

## 2023-11-27 RX ADMIN — HYDROCORTISONE: 10 LOTION TOPICAL at 09:47

## 2023-11-27 RX ADMIN — PANTOPRAZOLE SODIUM 40 MG: 40 TABLET, DELAYED RELEASE ORAL at 09:43

## 2023-11-27 RX ADMIN — Medication 1 TABLET: at 22:09

## 2023-11-27 RX ADMIN — PIPERACILLIN SODIUM AND TAZOBACTAM SODIUM 3.38 G: 3; .375 INJECTION, SOLUTION INTRAVENOUS at 05:48

## 2023-11-27 RX ADMIN — INSULIN LISPRO 1 UNITS: 100 INJECTION, SOLUTION INTRAVENOUS; SUBCUTANEOUS at 17:30

## 2023-11-27 RX ADMIN — HYDROCORTISONE: 10 LOTION TOPICAL at 22:10

## 2023-11-27 RX ADMIN — OXYCODONE HYDROCHLORIDE AND ACETAMINOPHEN 1 TABLET: 5; 325 TABLET ORAL at 09:44

## 2023-11-27 ASSESSMENT — COGNITIVE AND FUNCTIONAL STATUS - GENERAL
TURNING FROM BACK TO SIDE WHILE IN FLAT BAD: A LOT
DRESSING REGULAR LOWER BODY CLOTHING: A LOT
PERSONAL GROOMING: A LOT
MOVING FROM LYING ON BACK TO SITTING ON SIDE OF FLAT BED WITH BEDRAILS: A LOT
WALKING IN HOSPITAL ROOM: TOTAL
MOVING TO AND FROM BED TO CHAIR: TOTAL
DAILY ACTIVITIY SCORE: 14
STANDING UP FROM CHAIR USING ARMS: TOTAL
HELP NEEDED FOR BATHING: A LOT
TOILETING: A LOT
CLIMB 3 TO 5 STEPS WITH RAILING: TOTAL
DRESSING REGULAR UPPER BODY CLOTHING: A LOT
MOBILITY SCORE: 8

## 2023-11-27 ASSESSMENT — PAIN SCALES - GENERAL
PAINLEVEL_OUTOF10: 5 - MODERATE PAIN
PAINLEVEL_OUTOF10: 6
PAINLEVEL_OUTOF10: 5 - MODERATE PAIN

## 2023-11-27 ASSESSMENT — PAIN - FUNCTIONAL ASSESSMENT: PAIN_FUNCTIONAL_ASSESSMENT: 0-10

## 2023-11-27 NOTE — PROGRESS NOTES
11/27/2023  Met with pt in room, introduced self and explained role.  Pt stated he is from [facility]. Stated he plans on returning upon discharge.  Pt is trach and stated he has O2 with distilled water at the facility.  PCN will send referral.    Ella Cotter Rn [facility]

## 2023-11-27 NOTE — CARE PLAN
The patient's goals for the shift include      Problem: Skin  Goal: Prevent/minimize sheer/friction injuries  Flowsheets (Taken 11/27/2023 0253)  Prevent/minimize sheer/friction injuries:   HOB 30 degrees or less   Use pull sheet   Turn/reposition every 2 hours/use positioning/transfer devices     Problem: Skin  Goal: Promote skin healing  Flowsheets (Taken 11/27/2023 0254)  Promote skin healing: Turn/reposition every 2 hours/use positioning/transfer devices

## 2023-11-27 NOTE — PROGRESS NOTES
"Dionte Sharpe is a 53 y.o. male on day 2 of admission presenting with Kidney stone on left side.    Subjective   Follow-up left obstructing ureteral calculus with urosepsis s/p IR placement of nephrostomy tube.  Also has bilateral renal calculi and a bladder calculus.  Patient reports feeling \"so-so\", AM labs pending.  Nephrostomy tube with dark yellow urine.  Discussed definitive tx of stone.  No new urological complaints.    Objective     Physical Exam  Vitals and nursing note reviewed.   Constitutional:       General: He is not in acute distress.  Genitourinary:     Comments: Left PCNT to CD with dark yellow clear urine.  Neurological:      Mental Status: He is alert.   Psychiatric:         Behavior: Behavior is cooperative.       Last Recorded Vitals  Blood pressure 119/64, pulse 94, temperature 36 °C (96.8 °F), resp. rate 20, height 1.803 m (5' 11\"), weight 99.8 kg (220 lb), SpO2 94 %.  Intake/Output last 3 Shifts:  I/O last 3 completed shifts:  In: 200 (2 mL/kg) [IV Piggyback:200]  Out: 4000 (40.1 mL/kg) [Urine:3550 (1 mL/kg/hr); Stool:450]  Weight: 99.8 kg     Relevant Results  Scheduled medications  [Held by provider] apixaban, 5 mg, oral, BID  budesonide, 0.5 mg, nebulization, BID  carbamide peroxide, 5 drop, Right Ear, BID  [START ON 11/28/2023] cholecalciferol, 5,000 Units, oral, Once per day on Tue Thu  docusate sodium, 100 mg, oral, BID  esomeprazole, 40 mg, nasoduodenal tube, Daily before breakfast   Or  pantoprazole, 40 mg, oral, Daily before breakfast  folic acid, 1 mg, oral, Daily  hydrocortisone, , Topical, BID  insulin lispro, 0-5 Units, subcutaneous, TID with meals  iohexol, 10 mL, miscellaneous, Once in imaging  lacosamide, 100 mg, oral, q12h TAE  lactobacillus acidophilus, 1 tablet, oral, BID  levETIRAcetam, 750 mg, oral, BID  loratadine, 5 mg, oral, Daily  oxyCODONE-acetaminophen, 1 tablet, oral, BID  piperacillin-tazobactam, 3.375 g, intravenous, q6h  sertraline, 50 mg, oral, " Daily      Continuous medications     PRN medications  PRN medications: acetaminophen, baclofen, cyclobenzaprine, dextromethorphan-guaifenesin, dextrose 10 % in water (D10W), dextrose, glucagon, hydrOXYzine pamoate, [Held by provider] ibuprofen, ipratropium-albuteroL, LORazepam, mineral oil-hydrophilic petrolatum, ondansetron ODT **OR** ondansetron, oxyCODONE-acetaminophen    Results for orders placed or performed during the hospital encounter of 11/24/23 (from the past 24 hour(s))   POCT GLUCOSE   Result Value Ref Range    POCT Glucose 160 (H) 74 - 99 mg/dL   POCT GLUCOSE   Result Value Ref Range    POCT Glucose 207 (H) 74 - 99 mg/dL     Assessment/Plan   Principal Problem:    Kidney stone on left side    1. Left obstructing ureteral calculus with urosepsis  -POD #2 IR placement of percutaneous nephrostomy tube.  -AM labs pending, WBC still elevated as of yesterday.  -Blood culture positive, abx per Medicine.  -Definitive tx of stone to be determined as an outpatient once full course of abx completed.  -Discussed with patient.    Elsi Ball PA-C  [facility]  I'm available via [employer]
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
## 2023-11-27 NOTE — PROGRESS NOTES
Dionte Sharpe is a 53 y.o. male on day 2 of admission presenting with Kidney stone on left side.      Subjective   Seen today he is growing Pseudomonas in his urine and its covered I have him on PIP Tazo he has renal insufficiency as well have been a negative fluid balance I Rand consult with nephrology as well at this time       Objective     Last Recorded Vitals  /65   Pulse 96   Temp 36.1 °C (97 °F)   Resp 20   Wt 99.8 kg (220 lb)   SpO2 94%   Intake/Output last 3 Shifts:    Intake/Output Summary (Last 24 hours) at 11/27/2023 1313  Last data filed at 11/27/2023 0600  Gross per 24 hour   Intake 50 ml   Output 2500 ml   Net -2450 ml       Admission Weight  Weight: 99.8 kg (220 lb) (11/24/23 1741)    Daily Weight  11/24/23 : 99.8 kg (220 lb)    Image Results  Vascular US upper extremity venous duplex left  Narrative: Interpreted By:  Honorio Newsome,   STUDY:  [facility] US UPPER EXTREMITY VENOUS DUPLEX LEFT;  11/26/2023 8:17 pm      INDICATION:  Signs/Symptoms:swelling.      COMPARISON:  None.      ACCESSION NUMBER(S):  [id number]      ORDERING CLINICIAN:  JEAN PEOPLES      TECHNIQUE:  Grayscale, color and spectral Doppler sonographic imaging of the left  upper extremity venous system.      FINDINGS:  Segmental visualization of the left internal jugular vein, subclavian  vein, axillary vein, basilic vein, brachial veins and cephalic vein  demonstrate normal gray scale appearance, compressibility, color flow  and venous waveforms. Note, only the proximal cephalic vein was  visualized.      Subcutaneous edema is noted.      Impression: Excluding the mid to distal cephalic vein, which was insufficiently  visualized for adequate assessment, no evidence of left upper  extremity venous thrombosis.          MACRO:  None.      Signed by: Honorio Newsome 11/26/2023 8:56 PM  Dictation workstation:   [id number]    ROS no fever at this time but he complains of generalized pain discomfort  Physical Exam  Lungs  are clear heart has a regular rate and rhythm  Abdomen is soft and nontender  Relevant Results               Assessment/Plan   This patient currently has cardiac telemetry ordered; if you would like to modify or discontinue the telemetry order, click here to go to the orders activity to modify/discontinue the order.              Principal Problem:    Kidney stone on left side    For now continue with the same present care and therapy and treatment and I will repeat his counts is a need consult with nephrology for his acute on chronic kidney disease treat with Zosyn since he is growing Pseudomonas I will see what else he is growing as the cultures become available monitor his white count clear out his secretions continue with all present care and therapy              Eddie Rodriguez, DO

## 2023-11-27 NOTE — CARE PLAN
The patient's goals for the shift include      The clinical goals for the shift include comfort    Over the shift, the patient will be free from pain and refrain from complications

## 2023-11-27 NOTE — PROGRESS NOTES
1649:  Notified by [facility] patient is from [facility] and plan is to return.  Referral will be submitted for review.    1700:  [facility] can accept.  Will need new auth to admit.

## 2023-11-28 LAB
ALBUMIN SERPL BCP-MCNC: 2.9 G/DL (ref 3.4–5)
ALP SERPL-CCNC: 87 U/L (ref 33–120)
ALT SERPL W P-5'-P-CCNC: 23 U/L (ref 10–52)
ANION GAP SERPL CALC-SCNC: 10 MMOL/L (ref 10–20)
AST SERPL W P-5'-P-CCNC: 16 U/L (ref 9–39)
BACTERIA UR CULT: ABNORMAL
BILIRUB SERPL-MCNC: 0.4 MG/DL (ref 0–1.2)
BUN SERPL-MCNC: 16 MG/DL (ref 6–23)
CALCIUM SERPL-MCNC: 7.9 MG/DL (ref 8.6–10.3)
CHLORIDE SERPL-SCNC: 102 MMOL/L (ref 98–107)
CO2 SERPL-SCNC: 30 MMOL/L (ref 21–32)
CREAT SERPL-MCNC: 1.16 MG/DL (ref 0.5–1.3)
ERYTHROCYTE [DISTWIDTH] IN BLOOD BY AUTOMATED COUNT: 17.9 % (ref 11.5–14.5)
FERRITIN SERPL-MCNC: 146 NG/ML (ref 20–300)
GFR SERPL CREATININE-BSD FRML MDRD: 76 ML/MIN/1.73M*2
GLUCOSE BLD MANUAL STRIP-MCNC: 182 MG/DL (ref 74–99)
GLUCOSE BLD MANUAL STRIP-MCNC: 188 MG/DL (ref 74–99)
GLUCOSE BLD MANUAL STRIP-MCNC: 200 MG/DL (ref 74–99)
GLUCOSE BLD MANUAL STRIP-MCNC: 214 MG/DL (ref 74–99)
GLUCOSE SERPL-MCNC: 181 MG/DL (ref 74–99)
HCT VFR BLD AUTO: 36.7 % (ref 41–52)
HGB BLD-MCNC: 10.2 G/DL (ref 13.5–17.5)
IRON SATN MFR SERPL: 15 % (ref 25–45)
IRON SERPL-MCNC: 40 UG/DL (ref 35–150)
MCH RBC QN AUTO: 21.5 PG (ref 26–34)
MCHC RBC AUTO-ENTMCNC: 27.8 G/DL (ref 32–36)
MCV RBC AUTO: 77 FL (ref 80–100)
NRBC BLD-RTO: 0 /100 WBCS (ref 0–0)
OSMOLALITY UR: 321 MOSM/KG (ref 200–1200)
PLATELET # BLD AUTO: 224 X10*3/UL (ref 150–450)
POTASSIUM SERPL-SCNC: 4 MMOL/L (ref 3.5–5.3)
PROT SERPL-MCNC: 5.6 G/DL (ref 6.4–8.2)
RBC # BLD AUTO: 4.75 X10*6/UL (ref 4.5–5.9)
SODIUM SERPL-SCNC: 138 MMOL/L (ref 136–145)
TIBC SERPL-MCNC: 272 UG/DL (ref 240–445)
UIBC SERPL-MCNC: 232 UG/DL (ref 110–370)
WBC # BLD AUTO: 12.6 X10*3/UL (ref 4.4–11.3)

## 2023-11-28 PROCEDURE — 2500000004 HC RX 250 GENERAL PHARMACY W/ HCPCS (ALT 636 FOR OP/ED): Performed by: NURSE PRACTITIONER

## 2023-11-28 PROCEDURE — 36415 COLL VENOUS BLD VENIPUNCTURE: CPT | Mod: PARLAB | Performed by: INTERNAL MEDICINE

## 2023-11-28 PROCEDURE — 81050 URINALYSIS VOLUME MEASURE: CPT | Mod: PARLAB | Performed by: INTERNAL MEDICINE

## 2023-11-28 PROCEDURE — 82507 ASSAY OF CITRATE: CPT | Performed by: INTERNAL MEDICINE

## 2023-11-28 PROCEDURE — 82570 ASSAY OF URINE CREATININE: CPT | Mod: PARLAB | Performed by: INTERNAL MEDICINE

## 2023-11-28 PROCEDURE — 94640 AIRWAY INHALATION TREATMENT: CPT

## 2023-11-28 PROCEDURE — 36415 COLL VENOUS BLD VENIPUNCTURE: CPT | Performed by: INTERNAL MEDICINE

## 2023-11-28 PROCEDURE — 2500000001 HC RX 250 WO HCPCS SELF ADMINISTERED DRUGS (ALT 637 FOR MEDICARE OP): Performed by: INTERNAL MEDICINE

## 2023-11-28 PROCEDURE — 80053 COMPREHEN METABOLIC PANEL: CPT | Performed by: INTERNAL MEDICINE

## 2023-11-28 PROCEDURE — 2060000001 HC INTERMEDIATE ICU ROOM DAILY

## 2023-11-28 PROCEDURE — 2500000001 HC RX 250 WO HCPCS SELF ADMINISTERED DRUGS (ALT 637 FOR MEDICARE OP): Performed by: NURSE PRACTITIONER

## 2023-11-28 PROCEDURE — 83945 ASSAY OF OXALATE: CPT | Performed by: INTERNAL MEDICINE

## 2023-11-28 PROCEDURE — 82728 ASSAY OF FERRITIN: CPT | Mod: PARLAB | Performed by: INTERNAL MEDICINE

## 2023-11-28 PROCEDURE — 82947 ASSAY GLUCOSE BLOOD QUANT: CPT

## 2023-11-28 PROCEDURE — 2500000004 HC RX 250 GENERAL PHARMACY W/ HCPCS (ALT 636 FOR OP/ED): Performed by: INTERNAL MEDICINE

## 2023-11-28 PROCEDURE — 85027 COMPLETE CBC AUTOMATED: CPT | Performed by: INTERNAL MEDICINE

## 2023-11-28 PROCEDURE — 82340 ASSAY OF CALCIUM IN URINE: CPT | Mod: PARLAB | Performed by: INTERNAL MEDICINE

## 2023-11-28 PROCEDURE — 2500000002 HC RX 250 W HCPCS SELF ADMINISTERED DRUGS (ALT 637 FOR MEDICARE OP, ALT 636 FOR OP/ED): Performed by: NURSE PRACTITIONER

## 2023-11-28 PROCEDURE — 83540 ASSAY OF IRON: CPT | Performed by: INTERNAL MEDICINE

## 2023-11-28 RX ORDER — BALSAM PERU/CASTOR OIL
OINTMENT (GRAM) TOPICAL 2 TIMES DAILY
Status: DISCONTINUED | OUTPATIENT
Start: 2023-11-28 | End: 2023-12-06 | Stop reason: HOSPADM

## 2023-11-28 RX ORDER — METOPROLOL SUCCINATE 50 MG/1
50 TABLET, EXTENDED RELEASE ORAL DAILY
Status: DISCONTINUED | OUTPATIENT
Start: 2023-11-28 | End: 2023-12-05

## 2023-11-28 RX ADMIN — Medication 5 DROP: at 09:09

## 2023-11-28 RX ADMIN — Medication 1 TABLET: at 09:01

## 2023-11-28 RX ADMIN — Medication 5 DROP: at 20:21

## 2023-11-28 RX ADMIN — LEVETIRACETAM 750 MG: 500 TABLET, FILM COATED ORAL at 09:01

## 2023-11-28 RX ADMIN — Medication 1 TABLET: at 20:23

## 2023-11-28 RX ADMIN — LORATADINE 5 MG: 10 TABLET ORAL at 09:02

## 2023-11-28 RX ADMIN — FOLIC ACID 1 MG: 1 TABLET ORAL at 09:02

## 2023-11-28 RX ADMIN — INSULIN LISPRO 1 UNITS: 100 INJECTION, SOLUTION INTRAVENOUS; SUBCUTANEOUS at 09:02

## 2023-11-28 RX ADMIN — OXYCODONE HYDROCHLORIDE AND ACETAMINOPHEN 1 TABLET: 5; 325 TABLET ORAL at 09:01

## 2023-11-28 RX ADMIN — SODIUM CHLORIDE, SODIUM LACTATE, POTASSIUM CHLORIDE, CALCIUM CHLORIDE AND DEXTROSE MONOHYDRATE 75 ML/HR: 5; 600; 310; 30; 20 INJECTION, SOLUTION INTRAVENOUS at 05:59

## 2023-11-28 RX ADMIN — Medication 125 MCG: at 09:01

## 2023-11-28 RX ADMIN — INSULIN LISPRO 1 UNITS: 100 INJECTION, SOLUTION INTRAVENOUS; SUBCUTANEOUS at 18:18

## 2023-11-28 RX ADMIN — PIPERACILLIN SODIUM AND TAZOBACTAM SODIUM 3.38 G: 3; .375 INJECTION, SOLUTION INTRAVENOUS at 05:56

## 2023-11-28 RX ADMIN — PIPERACILLIN SODIUM AND TAZOBACTAM SODIUM 3.38 G: 3; .375 INJECTION, SOLUTION INTRAVENOUS at 23:28

## 2023-11-28 RX ADMIN — ESOMEPRAZOLE MAGNESIUM 40 MG: 40 FOR SUSPENSION ORAL at 09:02

## 2023-11-28 RX ADMIN — LEVETIRACETAM 750 MG: 500 TABLET, FILM COATED ORAL at 20:22

## 2023-11-28 RX ADMIN — INSULIN LISPRO 2 UNITS: 100 INJECTION, SOLUTION INTRAVENOUS; SUBCUTANEOUS at 12:19

## 2023-11-28 RX ADMIN — HYDROCORTISONE: 10 LOTION TOPICAL at 09:09

## 2023-11-28 RX ADMIN — BUDESONIDE 0.5 MG: 0.5 INHALANT ORAL at 07:45

## 2023-11-28 RX ADMIN — DOCUSATE SODIUM 100 MG: 100 CAPSULE, LIQUID FILLED ORAL at 09:02

## 2023-11-28 RX ADMIN — HYDROCORTISONE 1 APPLICATION: 10 LOTION TOPICAL at 20:21

## 2023-11-28 RX ADMIN — PIPERACILLIN SODIUM AND TAZOBACTAM SODIUM 3.38 G: 3; .375 INJECTION, SOLUTION INTRAVENOUS at 18:17

## 2023-11-28 RX ADMIN — PIPERACILLIN SODIUM AND TAZOBACTAM SODIUM 3.38 G: 3; .375 INJECTION, SOLUTION INTRAVENOUS at 12:19

## 2023-11-28 RX ADMIN — DOCUSATE SODIUM 100 MG: 100 CAPSULE, LIQUID FILLED ORAL at 20:22

## 2023-11-28 RX ADMIN — PIPERACILLIN SODIUM AND TAZOBACTAM SODIUM 3.38 G: 3; .375 INJECTION, SOLUTION INTRAVENOUS at 00:08

## 2023-11-28 RX ADMIN — LACOSAMIDE 100 MG: 100 TABLET, FILM COATED ORAL at 09:01

## 2023-11-28 RX ADMIN — CASTOR OIL AND BALSAM, PERU: 788; 87 OINTMENT TOPICAL at 20:23

## 2023-11-28 RX ADMIN — LACOSAMIDE 100 MG: 100 TABLET, FILM COATED ORAL at 20:23

## 2023-11-28 RX ADMIN — SERTRALINE HYDROCHLORIDE 50 MG: 50 TABLET ORAL at 09:02

## 2023-11-28 RX ADMIN — Medication 100 MG: at 11:07

## 2023-11-28 RX ADMIN — BUDESONIDE 0.5 MG: 0.5 INHALANT ORAL at 19:47

## 2023-11-28 RX ADMIN — METOPROLOL SUCCINATE 50 MG: 50 TABLET, EXTENDED RELEASE ORAL at 11:09

## 2023-11-28 RX ADMIN — OXYCODONE HYDROCHLORIDE AND ACETAMINOPHEN 1 TABLET: 5; 325 TABLET ORAL at 20:22

## 2023-11-28 ASSESSMENT — COGNITIVE AND FUNCTIONAL STATUS - GENERAL
STANDING UP FROM CHAIR USING ARMS: TOTAL
MOVING FROM LYING ON BACK TO SITTING ON SIDE OF FLAT BED WITH BEDRAILS: A LOT
TOILETING: A LOT
PERSONAL GROOMING: A LOT
DRESSING REGULAR LOWER BODY CLOTHING: A LOT
TURNING FROM BACK TO SIDE WHILE IN FLAT BAD: A LOT
MOBILITY SCORE: 8
HELP NEEDED FOR BATHING: A LOT
DRESSING REGULAR UPPER BODY CLOTHING: A LOT
MOVING TO AND FROM BED TO CHAIR: TOTAL
CLIMB 3 TO 5 STEPS WITH RAILING: TOTAL
DAILY ACTIVITIY SCORE: 14
WALKING IN HOSPITAL ROOM: TOTAL

## 2023-11-28 ASSESSMENT — PAIN SCALES - GENERAL: PAINLEVEL_OUTOF10: 5 - MODERATE PAIN

## 2023-11-28 ASSESSMENT — PAIN - FUNCTIONAL ASSESSMENT
PAIN_FUNCTIONAL_ASSESSMENT: 0-10
PAIN_FUNCTIONAL_ASSESSMENT: 0-10

## 2023-11-28 NOTE — PROGRESS NOTES
"Dionte Sharpe is a 53 y.o. male on day 3 of admission presenting with Kidney stone on left side.    Subjective   Follow-up left obstructing ureteral calculus with urosepsis s/p IR placement of nephrostomy tube.  Also has bilateral renal calculi and a bladder calculus.  Patient has left flank pain, renal US without hydro.  Nephrostomy tube with yellow clear urine.     Objective     Physical Exam  Vitals and nursing note reviewed.   Constitutional:       General: He is awake. He is not in acute distress.  Genitourinary:     Comments: Nephrostomy to CD with urine yellow clear.  Neurological:      Mental Status: He is alert and oriented to person, place, and time.   Psychiatric:         Behavior: Behavior is cooperative.       Last Recorded Vitals  Blood pressure 160/88, pulse 92, temperature 36.1 °C (97 °F), temperature source Temporal, resp. rate 22, height 1.803 m (5' 11\"), weight 99.8 kg (220 lb), SpO2 96 %.  Intake/Output last 3 Shifts:  I/O last 3 completed shifts:  In: 968.8 (9.7 mL/kg) [I.V.:868.8 (8.7 mL/kg); IV Piggyback:100]  Out: 2150 (21.5 mL/kg) [Urine:1850 (0.5 mL/kg/hr); Stool:300]  Weight: 99.8 kg     Relevant Results  Scheduled medications  [Held by provider] apixaban, 5 mg, oral, BID  budesonide, 0.5 mg, nebulization, BID  carbamide peroxide, 5 drop, Right Ear, BID  cholecalciferol, 5,000 Units, oral, Once per day on Tue Thu  docusate sodium, 100 mg, oral, BID  esomeprazole, 40 mg, nasoduodenal tube, Daily before breakfast  folic acid, 1 mg, oral, Daily  hydrocortisone, , Topical, BID  insulin lispro, 0-5 Units, subcutaneous, TID with meals  iohexol, 10 mL, miscellaneous, Once in imaging  lacosamide, 100 mg, oral, q12h TAE  lactobacillus acidophilus, 1 tablet, oral, BID  levETIRAcetam, 750 mg, oral, BID  loratadine, 5 mg, oral, Daily  oxyCODONE-acetaminophen, 1 tablet, oral, BID  piperacillin-tazobactam, 3.375 g, intravenous, q6h  pyridoxine, 100 mg, oral, Daily  sertraline, 50 mg, oral, " Daily      Continuous medications  dextrose 5 % and lactated Ringer's, 75 mL/hr, Last Rate: 75 mL/hr (11/28/23 0559)      PRN medications  PRN medications: acetaminophen, baclofen, cyclobenzaprine, dextromethorphan-guaifenesin, dextrose 10 % in water (D10W), dextrose, glucagon, hydrOXYzine pamoate, [Held by provider] ibuprofen, ipratropium-albuteroL, LORazepam, mineral oil-hydrophilic petrolatum, ondansetron ODT **OR** ondansetron, oxyCODONE-acetaminophen    Results for orders placed or performed during the hospital encounter of 11/24/23 (from the past 24 hour(s))   POCT GLUCOSE   Result Value Ref Range    POCT Glucose 221 (H) 74 - 99 mg/dL   Uric Acid   Result Value Ref Range    Uric Acid 2.9 (L) 4.0 - 7.5 mg/dL   B-type natriuretic peptide   Result Value Ref Range    BNP 27 0 - 99 pg/mL   POCT GLUCOSE   Result Value Ref Range    POCT Glucose 186 (H) 74 - 99 mg/dL   Urine electrolytes   Result Value Ref Range    Sodium, Urine Random 58 mmol/L    Sodium/Creatinine Ratio 136 Not established. mmol/g Creat    Potassium, Urine Random 25 mmol/L    Potassium/Creatinine Ratio 58 Not established mmol/g Creat    Chloride, Urine Random 59 mmol/L    Chloride/Creatinine Ratio 138 23 - 275 mmol/g creat    Creatinine, Urine Random 42.8 20.0 - 370.0 mg/dL   Albumin, Urine Random   Result Value Ref Range    Albumin, Urine Random 602.0 Not established mg/L    Creatinine, Urine Random 42.8 20.0 - 370.0 mg/dL    Albumin/Creatine Ratio 1,406.5 (H) <30.0 ug/mg Creat   Osmolality, urine   Result Value Ref Range    Osmolality, Urine Random 321 200 - 1,200 mOsm/kg   Urea Nitrogen, Urine Random   Result Value Ref Range    Urea Nitrogen, Urine Random 341 mg/dL    Creatinine, Urine Random 42.8 20.0 - 370.0 mg/dL    Urea Nitrogen/Creatinine Ratio 8.0 Not established. g/g creat   Urinalysis with Reflex Microscopic   Result Value Ref Range    Color, Urine Yellow Straw, Yellow    Appearance, Urine Hazy (N) Clear    Specific Gravity, Urine 1.012  1.005 - 1.035    pH, Urine 6.0 5.0, 5.5, 6.0, 6.5, 7.0, 7.5, 8.0    Protein, Urine 100 (2+) (N) NEGATIVE mg/dL    Glucose, Urine NEGATIVE NEGATIVE mg/dL    Blood, Urine LARGE (3+) (A) NEGATIVE    Ketones, Urine NEGATIVE NEGATIVE mg/dL    Bilirubin, Urine NEGATIVE NEGATIVE    Urobilinogen, Urine <2.0 <2.0 mg/dL    Nitrite, Urine NEGATIVE NEGATIVE    Leukocyte Esterase, Urine LARGE (3+) (A) NEGATIVE   Microscopic Only, Urine   Result Value Ref Range    WBC, Urine >50 (A) 1-5, NONE /HPF    WBC Clumps, Urine MANY Reference range not established. /HPF    RBC, Urine >20 (A) NONE, 1-2, 3-5 /HPF    Mucus, Urine 1+ Reference range not established. /LPF   POCT GLUCOSE   Result Value Ref Range    POCT Glucose 257 (H) 74 - 99 mg/dL   CBC   Result Value Ref Range    WBC 12.6 (H) 4.4 - 11.3 x10*3/uL    nRBC 0.0 0.0 - 0.0 /100 WBCs    RBC 4.75 4.50 - 5.90 x10*6/uL    Hemoglobin 10.2 (L) 13.5 - 17.5 g/dL    Hematocrit 36.7 (L) 41.0 - 52.0 %    MCV 77 (L) 80 - 100 fL    MCH 21.5 (L) 26.0 - 34.0 pg    MCHC 27.8 (L) 32.0 - 36.0 g/dL    RDW 17.9 (H) 11.5 - 14.5 %    Platelets 224 150 - 450 x10*3/uL   Comprehensive metabolic panel   Result Value Ref Range    Glucose 181 (H) 74 - 99 mg/dL    Sodium 138 136 - 145 mmol/L    Potassium 4.0 3.5 - 5.3 mmol/L    Chloride 102 98 - 107 mmol/L    Bicarbonate 30 21 - 32 mmol/L    Anion Gap 10 10 - 20 mmol/L    Urea Nitrogen 16 6 - 23 mg/dL    Creatinine 1.16 0.50 - 1.30 mg/dL    eGFR 76 >60 mL/min/1.73m*2    Calcium 7.9 (L) 8.6 - 10.3 mg/dL    Albumin 2.9 (L) 3.4 - 5.0 g/dL    Alkaline Phosphatase 87 33 - 120 U/L    Total Protein 5.6 (L) 6.4 - 8.2 g/dL    AST 16 9 - 39 U/L    Bilirubin, Total 0.4 0.0 - 1.2 mg/dL    ALT 23 10 - 52 U/L   Iron and TIBC   Result Value Ref Range    Iron 40 35 - 150 ug/dL    UIBC 232 110 - 370 ug/dL    TIBC 272 240 - 445 ug/dL    % Saturation 15 (L) 25 - 45 %   POCT GLUCOSE   Result Value Ref Range    POCT Glucose 182 (H) 74 - 99 mg/dL     US renal complete    Result  Date: 11/27/2023  Interpreted By:  Syo Diaz, STUDY: US RENAL COMPLETE;  11/27/2023 4:22 pm   INDICATION: Signs/Symptoms:renal failure.   COMPARISON: None.   ACCESSION NUMBER(S): [id number]   ORDERING CLINICIAN: JEREMIAH CALVERT   TECHNIQUE: Multiple images of the kidneys were obtained  , with color Doppler for blood flow.   FINDINGS: RIGHT KIDNEY: The right kidney measures 13.3 cm in length.  The renal cortex is within normal limits.   There are multiple echogenicities indicating nephrolithiasis. The largest measures approximately 1 cm at the midpole. No hydronephrosis. Color Doppler demonstrates renal blood flow.   LEFT KIDNEY: The left kidney measures 14 cm in length.  The renal cortex is within normal limits.  There also multiple intrarenal calculi, the largest measuring approximately 1 cm at the lower pole. No hydronephrosis. Color Doppler demonstrates renal blood flow.   BLADDER: An intraluminal echogenicity with shadowing measuring 2.4 cm would indicate a calculus. Mural thickening measuring 7 mm is probably due to underdistention, but cystitis is possible.   OTHER FINDINGS: None significant.       Bilateral nonobstructing nephrolithiasis. Urinary bladder calculus. Nonspecific mural thickening of the urinary bladder, but most likely from underdistention.   Signed by: Soy Diaz 11/27/2023 5:28 PM Dictation workstation:   [id number]    Vascular US upper extremity venous duplex left    Result Date: 11/26/2023  Interpreted By:  Honorio Newsome, STUDY: VAS US UPPER EXTREMITY VENOUS DUPLEX LEFT;  11/26/2023 8:17 pm   INDICATION: Signs/Symptoms:swelling.   COMPARISON: None.   ACCESSION NUMBER(S): [id number]   ORDERING CLINICIAN: JEAN PEOPLES   TECHNIQUE: Grayscale, color and spectral Doppler sonographic imaging of the left upper extremity venous system.   FINDINGS: Segmental visualization of the left internal jugular vein, subclavian vein, axillary vein, basilic vein, brachial veins and cephalic  vein demonstrate normal gray scale appearance, compressibility, color flow and venous waveforms. Note, only the proximal cephalic vein was visualized.   Subcutaneous edema is noted.       Excluding the mid to distal cephalic vein, which was insufficiently visualized for adequate assessment, no evidence of left upper extremity venous thrombosis.     MACRO: None.   Signed by: Honorio Newsome 11/26/2023 8:56 PM Dictation workstation:   [id number]    IR placement of nephrostomy catheter    Result Date: 11/25/2023  Interpreted By:  Omid Desir, STUDY: IR GUï¿½NEPHROSTOMY PLACEMENT; ;  11/25/2023 10:55 am 1. LEFT ANTEGRADE NEPHROSTOGRAM 2. ULTRASOUND FLUOROSCOPICALLY GUIDED LEFT PERCUTANEOUS NEPHROSTOMY     INDICATION: Signs/Symptoms:obstructing and infected L ureteral stone, hoping for perc nephrostomy. 53-year-old man with left ureterolithiasis, sepsis.   COMPARISON: CT abdomen pelvis 11/24/2023   ACCESSION NUMBER(S): [id number]   ORDERING CLINICIAN: OSEI KUO   TECHNIQUE:   ATTENDING : Omid Desir M.D.   TECHNICAL DESCRIPTION/FINDINGS: The procedure, including all risks, benefits and alternatives were explained to the patient in detail. All questions were answered and written informed consent was obtained.   The patient was positioned prone on the fluoroscopy table. A time-out was performed.   The left flank was prepped and draped in usual sterile fashion. Focused ultrasound was performed of the left kidney demonstrating mild to moderate left hydronephrosis. Ultrasound images were saved. 1% lidocaine was administered subcutaneously for local anesthesia. Under real-time ultrasound guidance, a 21 gauge access needle was advanced into a dilated left renal calyx. Ultrasound images were saved. Removal of the stylet yielded return of turbid urine. Dilute contrast was injected opacifying the moderately dilated left renal collecting system. Under fluoroscopic visualization, an 018 guidewire was  advanced into the upstream aspect of the left ureter and a coaxial introducer was utilized for placement of a parallel 035 working wire. After serial dilation over the 035 wire, a 10 Armenian pigtail drainage catheter functioning as a nephrostomy tube was placed. The cope retention loop was formed in the central left renal collecting system. Completion contrast injection confirms appropriate positioning.   A 10 cc representative specimen of turbid urine was then collected for microbiological analysis. The newly placed nephrostomy tube was sutured to the skin with 2 0 Prolene stay suture connected to gravity bag drainage. Sterile dressing was applied.   SEDATION/MEDICATIONS: Continuous cardiopulmonary monitoring was performed by a radiology nurse for the duration of the procedure.  1 mg Versed and   50 mcg Fentanyl were administered for moderate conscious sedation time of 30 minutes.      10 cc 1% Lidocaine was administered subcutaneously for local anesthesia. SPECIMENS: 10 cc turbid urine aspirated from the left renal collecting system after nephrostomy tube placement, sent for microbiological analysis. ESTIMATED BLOOD LOSS:  5 cc FLOUROSCOPY:  0.9 minutes; DAP  327.86 uGy-cm*2; Air Kerma  20.0 mGy CONTRAST:   FINDINGS: Test       1. Mild to moderate left hydronephrosis with nephro and ureterolithiasis present. 2. Ultrasound fluoroscopically guided left percutaneous nephrostomy, to gravity drainage. A 10 cc representative specimen of turbid urine was collected for microbiological analysis.     MACRO: None   Signed by: Omid Desir 11/25/2023 12:07 PM Dictation workstation:   [id number]    CT abdomen pelvis w IV contrast    Result Date: 11/24/2023  Interpreted By:  Trev Michaud, STUDY: CT ABDOMEN PELVIS W IV CONTRAST;  11/24/2023 8:58 pm   INDICATION: Signs/Symptoms:LLQ pain, eval for stone vs diverticular disease.   COMPARISON: 02/10/2023   ACCESSION NUMBER(S): [id number]   ORDERING CLINICIAN: KALEB  BUCCA   TECHNIQUE: CT of the abdomen and pelvis was performed. Contiguous axial images were obtained at 3 mm slice thickness through the abdomen and pelvis. Coronal and sagittal reconstructions at 3 mm slice thickness were performed.  Intravenous contrast administered   FINDINGS: Body habitus limits sensitivity. Small lung volumes with vascular crowding the lung bases. I can not exclude mild edema. Correlation with BNP is advised.   Fatty infiltration of the liver. Unremarkable pancreas. Stable spleen. Multiple bilateral nephroliths. Percutaneous nephrostomy tubes have been removed. Left greater than right enhancement of the collecting system. Certainly infection not excluded. There is a 7 mm left proximal ureteral stone detected causing upstream hydronephrosis. Clustered calcifications seen in the left kidney. Reference left lower pole 1.1 cm, left lower pole 1.7 cm, left midpole 1.8 cm clustered calcification. Additional calcifications seen in the upper poles. Multiple right-sided stones are also seen with the largest clustered measuring 3 x 1.1 cm. No hydronephrosis seen on the right. There is a bladder stone measuring 1.8 x 1.7 cm. Significant rectus diastasis again noted. Prostate gland is heterogeneous. Urinary bladder not particularly distended. No pelvic adenopathy. Scoliosis and mild multilevel degenerative disc disease.       1.  Abnormal examination. Body habitus limits sensitivity. Features may reflect pulmonary edema with vascular crowding in the lung bases. Correlation with volume status advised. 2. Numerous bilateral large renal cysts as well as a large bladder stone. There is moderate left-sided proximal hydronephrosis and hydroureter with an obstructing 7 mm stone. No perinephric fluid collections.     MACRO: None   Signed by: Trev Michaud 11/24/2023 9:54 PM Dictation workstation:   [id number]      Assessment/Plan   Principal Problem:    Kidney stone on left side    1. Left obstructing  ureteral calculus with urosepsis  -POD #3 IR placement of percutaneous nephrostomy tube.  -Leukocytosis improving.  Creatinine stable, Nephrology following.  -Renal US without hydro.  -Blood culture positive, abx per Medicine.  -Definitive tx of stone to be determined as an outpatient once full course of abx completed.  -Discussed with patient.    Elsi Ball PA-C  [facility]  I'm available via [employer]

## 2023-11-28 NOTE — PROGRESS NOTES
Occupational Therapy                 Therapy Communication Note    Patient Name: Dionte Sharpe  MRN: [id number]  Today's Date: 11/28/2023     Discipline: Occupational Therapy    Missed Visit Reason: Missed Visit Reason:  (Chart reviewed and OT screening performed at 10:09 am as patient at baseline function:  bed bound; total care for ADL's.  Case conference with RN.  Discharge patient from OT since no OT skilled needs.)    Missed Time: Attempt

## 2023-11-28 NOTE — PROGRESS NOTES
Dionte Sharpe is a 53 y.o. male on day 3 of admission presenting with Kidney stone on left side.      Subjective   Patient denies any further abdominal flank pain.  Fractional  Uric acid is low at 2.9  Fractional excretion of urea is 42.9%   Have low iron saturation of 15%  Renal chemistries downtrending today creatinine is 1.16  Renal ultrasound showed bilateral multiple kidney stones largest 1 in the midpole in the right a 1 cm 1 cm kidney stone in the lower pole of the left kidney  There is no hydronephrosis and kidney size in the right is 13.3 cm ;left kidney size is 40 cm  Patient blood pressure and pulse are elevated he is going to be started in the beta-blocker  Patient have elevation of the urine albumin creatinine ratio 1406.5 (nonnephrotic range proteinuria)  Objective      General appearance; alert  Neck no JVD no bruit no thyromegaly  Lungs are clear  Heart is rhythmic no gallop no rubs or thrills  Abdomen; active peristalsis no rebound or guarding  ; no CVA tenderness  Extremities; edema 2+  Neurological; pathological reflexes are absent    Vitals 24HR  Heart Rate:  []   Temp:  [36 °C (96.8 °F)-36.4 °C (97.5 °F)]   Resp:  [18-30]   BP: (115-160)/(65-88)   SpO2:  [94 %-96 %]         Intake/Output last 3 Shifts:    Intake/Output Summary (Last 24 hours) at 11/28/2023 1206  Last data filed at 11/28/2023 0900  Gross per 24 hour   Intake 968.75 ml   Output 1625 ml   Net -656.25 ml     Results for orders placed or performed during the hospital encounter of 11/24/23 (from the past 24 hour(s))   POCT GLUCOSE   Result Value Ref Range    POCT Glucose 221 (H) 74 - 99 mg/dL   Uric Acid   Result Value Ref Range    Uric Acid 2.9 (L) 4.0 - 7.5 mg/dL   B-type natriuretic peptide   Result Value Ref Range    BNP 27 0 - 99 pg/mL   POCT GLUCOSE   Result Value Ref Range    POCT Glucose 186 (H) 74 - 99 mg/dL   Urine electrolytes   Result Value Ref Range    Sodium, Urine Random 58 mmol/L    Sodium/Creatinine Ratio 136  Not established. mmol/g Creat    Potassium, Urine Random 25 mmol/L    Potassium/Creatinine Ratio 58 Not established mmol/g Creat    Chloride, Urine Random 59 mmol/L    Chloride/Creatinine Ratio 138 23 - 275 mmol/g creat    Creatinine, Urine Random 42.8 20.0 - 370.0 mg/dL   Albumin, Urine Random   Result Value Ref Range    Albumin, Urine Random 602.0 Not established mg/L    Creatinine, Urine Random 42.8 20.0 - 370.0 mg/dL    Albumin/Creatine Ratio 1,406.5 (H) <30.0 ug/mg Creat   Osmolality, urine   Result Value Ref Range    Osmolality, Urine Random 321 200 - 1,200 mOsm/kg   Urea Nitrogen, Urine Random   Result Value Ref Range    Urea Nitrogen, Urine Random 341 mg/dL    Creatinine, Urine Random 42.8 20.0 - 370.0 mg/dL    Urea Nitrogen/Creatinine Ratio 8.0 Not established. g/g creat   Urinalysis with Reflex Microscopic   Result Value Ref Range    Color, Urine Yellow Straw, Yellow    Appearance, Urine Hazy (N) Clear    Specific Gravity, Urine 1.012 1.005 - 1.035    pH, Urine 6.0 5.0, 5.5, 6.0, 6.5, 7.0, 7.5, 8.0    Protein, Urine 100 (2+) (N) NEGATIVE mg/dL    Glucose, Urine NEGATIVE NEGATIVE mg/dL    Blood, Urine LARGE (3+) (A) NEGATIVE    Ketones, Urine NEGATIVE NEGATIVE mg/dL    Bilirubin, Urine NEGATIVE NEGATIVE    Urobilinogen, Urine <2.0 <2.0 mg/dL    Nitrite, Urine NEGATIVE NEGATIVE    Leukocyte Esterase, Urine LARGE (3+) (A) NEGATIVE   Microscopic Only, Urine   Result Value Ref Range    WBC, Urine >50 (A) 1-5, NONE /HPF    WBC Clumps, Urine MANY Reference range not established. /HPF    RBC, Urine >20 (A) NONE, 1-2, 3-5 /HPF    Mucus, Urine 1+ Reference range not established. /LPF   POCT GLUCOSE   Result Value Ref Range    POCT Glucose 257 (H) 74 - 99 mg/dL   CBC   Result Value Ref Range    WBC 12.6 (H) 4.4 - 11.3 x10*3/uL    nRBC 0.0 0.0 - 0.0 /100 WBCs    RBC 4.75 4.50 - 5.90 x10*6/uL    Hemoglobin 10.2 (L) 13.5 - 17.5 g/dL    Hematocrit 36.7 (L) 41.0 - 52.0 %    MCV 77 (L) 80 - 100 fL    MCH 21.5 (L) 26.0 -  34.0 pg    MCHC 27.8 (L) 32.0 - 36.0 g/dL    RDW 17.9 (H) 11.5 - 14.5 %    Platelets 224 150 - 450 x10*3/uL   Comprehensive metabolic panel   Result Value Ref Range    Glucose 181 (H) 74 - 99 mg/dL    Sodium 138 136 - 145 mmol/L    Potassium 4.0 3.5 - 5.3 mmol/L    Chloride 102 98 - 107 mmol/L    Bicarbonate 30 21 - 32 mmol/L    Anion Gap 10 10 - 20 mmol/L    Urea Nitrogen 16 6 - 23 mg/dL    Creatinine 1.16 0.50 - 1.30 mg/dL    eGFR 76 >60 mL/min/1.73m*2    Calcium 7.9 (L) 8.6 - 10.3 mg/dL    Albumin 2.9 (L) 3.4 - 5.0 g/dL    Alkaline Phosphatase 87 33 - 120 U/L    Total Protein 5.6 (L) 6.4 - 8.2 g/dL    AST 16 9 - 39 U/L    Bilirubin, Total 0.4 0.0 - 1.2 mg/dL    ALT 23 10 - 52 U/L   Iron and TIBC   Result Value Ref Range    Iron 40 35 - 150 ug/dL    UIBC 232 110 - 370 ug/dL    TIBC 272 240 - 445 ug/dL    % Saturation 15 (L) 25 - 45 %   Ferritin   Result Value Ref Range    Ferritin 146 20 - 300 ng/mL   POCT GLUCOSE   Result Value Ref Range    POCT Glucose 182 (H) 74 - 99 mg/dL   POCT GLUCOSE   Result Value Ref Range    POCT Glucose 214 (H) 74 - 99 mg/dL    US renal complete    Result Date: 11/27/2023  Interpreted By:  Soy Diaz, STUDY: US RENAL COMPLETE;  11/27/2023 4:22 pm   INDICATION: Signs/Symptoms:renal failure.   COMPARISON: None.   ACCESSION NUMBER(S): [id number]   ORDERING CLINICIAN: JEREMIAH CALVERT   TECHNIQUE: Multiple images of the kidneys were obtained  , with color Doppler for blood flow.   FINDINGS: RIGHT KIDNEY: The right kidney measures 13.3 cm in length.  The renal cortex is within normal limits.   There are multiple echogenicities indicating nephrolithiasis. The largest measures approximately 1 cm at the midpole. No hydronephrosis. Color Doppler demonstrates renal blood flow.   LEFT KIDNEY: The left kidney measures 14 cm in length.  The renal cortex is within normal limits.  There also multiple intrarenal calculi, the largest measuring approximately 1 cm at the lower pole. No  hydronephrosis. Color Doppler demonstrates renal blood flow.   BLADDER: An intraluminal echogenicity with shadowing measuring 2.4 cm would indicate a calculus. Mural thickening measuring 7 mm is probably due to underdistention, but cystitis is possible.   OTHER FINDINGS: None significant.       Bilateral nonobstructing nephrolithiasis. Urinary bladder calculus. Nonspecific mural thickening of the urinary bladder, but most likely from underdistention.   Signed by: Soy Diaz 11/27/2023 5:28 PM Dictation workstation:   [id number]    Vascular US upper extremity venous duplex left    Result Date: 11/26/2023  Interpreted By:  Honorio Newsome, STUDY: VASC US UPPER EXTREMITY VENOUS DUPLEX LEFT;  11/26/2023 8:17 pm   INDICATION: Signs/Symptoms:swelling.   COMPARISON: None.   ACCESSION NUMBER(S): [id number]   ORDERING CLINICIAN: JEAN PEOPLES   TECHNIQUE: Grayscale, color and spectral Doppler sonographic imaging of the left upper extremity venous system.   FINDINGS: Segmental visualization of the left internal jugular vein, subclavian vein, axillary vein, basilic vein, brachial veins and cephalic vein demonstrate normal gray scale appearance, compressibility, color flow and venous waveforms. Note, only the proximal cephalic vein was visualized.   Subcutaneous edema is noted.       Excluding the mid to distal cephalic vein, which was insufficiently visualized for adequate assessment, no evidence of left upper extremity venous thrombosis.     MACRO: None.   Signed by: Honorio Newsome 11/26/2023 8:56 PM Dictation workstation:   [id number]    IR placement of nephrostomy catheter    Result Date: 11/25/2023  Interpreted By:  Omid Desir, STUDY: IR GUï¿½NEPHROSTOMY PLACEMENT; ;  11/25/2023 10:55 am 1. LEFT ANTEGRADE NEPHROSTOGRAM 2. ULTRASOUND FLUOROSCOPICALLY GUIDED LEFT PERCUTANEOUS NEPHROSTOMY     INDICATION: Signs/Symptoms:obstructing and infected L ureteral stone, hoping for perc nephrostomy. 53-year-old  man with left ureterolithiasis, sepsis.   COMPARISON: CT abdomen pelvis 11/24/2023   ACCESSION NUMBER(S): [id number]   ORDERING CLINICIAN: OSEI KUO   TECHNIQUE:   ATTENDING : Omid Desir M.D.   TECHNICAL DESCRIPTION/FINDINGS: The procedure, including all risks, benefits and alternatives were explained to the patient in detail. All questions were answered and written informed consent was obtained.   The patient was positioned prone on the fluoroscopy table. A time-out was performed.   The left flank was prepped and draped in usual sterile fashion. Focused ultrasound was performed of the left kidney demonstrating mild to moderate left hydronephrosis. Ultrasound images were saved. 1% lidocaine was administered subcutaneously for local anesthesia. Under real-time ultrasound guidance, a 21 gauge access needle was advanced into a dilated left renal calyx. Ultrasound images were saved. Removal of the stylet yielded return of turbid urine. Dilute contrast was injected opacifying the moderately dilated left renal collecting system. Under fluoroscopic visualization, an 018 guidewire was advanced into the upstream aspect of the left ureter and a coaxial introducer was utilized for placement of a parallel 035 working wire. After serial dilation over the 035 wire, a 10 [place] pigtail drainage catheter functioning as a nephrostomy tube was placed. The cope retention loop was formed in the central left renal collecting system. Completion contrast injection confirms appropriate positioning.   A 10 cc representative specimen of turbid urine was then collected for microbiological analysis. The newly placed nephrostomy tube was sutured to the skin with 2 0 Prolene stay suture connected to gravity bag drainage. Sterile dressing was applied.   SEDATION/MEDICATIONS: Continuous cardiopulmonary monitoring was performed by a radiology nurse for the duration of the procedure.  1 mg Versed and   50 mcg Fentanyl were  administered for moderate conscious sedation time of 30 minutes.      10 cc 1% Lidocaine was administered subcutaneously for local anesthesia. SPECIMENS: 10 cc turbid urine aspirated from the left renal collecting system after nephrostomy tube placement, sent for microbiological analysis. ESTIMATED BLOOD LOSS:  5 cc FLOUROSCOPY:  0.9 minutes; DAP  327.86 uGy-cm*2; Air Kerma  20.0 mGy CONTRAST:   FINDINGS: Test       1. Mild to moderate left hydronephrosis with nephro and ureterolithiasis present. 2. Ultrasound fluoroscopically guided left percutaneous nephrostomy, to gravity drainage. A 10 cc representative specimen of turbid urine was collected for microbiological analysis.     MACRO: None   Signed by: Omid Desir 11/25/2023 12:07 PM Dictation workstation:   [id number]    CT abdomen pelvis w IV contrast    Result Date: 11/24/2023  Interpreted By:  Trev Michaud, STUDY: CT ABDOMEN PELVIS W IV CONTRAST;  11/24/2023 8:58 pm   INDICATION: Signs/Symptoms:LLQ pain, eval for stone vs diverticular disease.   COMPARISON: 02/10/2023   ACCESSION NUMBER(S): [id number]   ORDERING CLINICIAN: KALEB KING   TECHNIQUE: CT of the abdomen and pelvis was performed. Contiguous axial images were obtained at 3 mm slice thickness through the abdomen and pelvis. Coronal and sagittal reconstructions at 3 mm slice thickness were performed.  Intravenous contrast administered   FINDINGS: Body habitus limits sensitivity. Small lung volumes with vascular crowding the lung bases. I can not exclude mild edema. Correlation with BNP is advised.   Fatty infiltration of the liver. Unremarkable pancreas. Stable spleen. Multiple bilateral nephroliths. Percutaneous nephrostomy tubes have been removed. Left greater than right enhancement of the collecting system. Certainly infection not excluded. There is a 7 mm left proximal ureteral stone detected causing upstream hydronephrosis. Clustered calcifications seen in the left kidney. Reference  left lower pole 1.1 cm, left lower pole 1.7 cm, left midpole 1.8 cm clustered calcification. Additional calcifications seen in the upper poles. Multiple right-sided stones are also seen with the largest clustered measuring 3 x 1.1 cm. No hydronephrosis seen on the right. There is a bladder stone measuring 1.8 x 1.7 cm. Significant rectus diastasis again noted. Prostate gland is heterogeneous. Urinary bladder not particularly distended. No pelvic adenopathy. Scoliosis and mild multilevel degenerative disc disease.       1.  Abnormal examination. Body habitus limits sensitivity. Features may reflect pulmonary edema with vascular crowding in the lung bases. Correlation with volume status advised. 2. Numerous bilateral large renal cysts as well as a large bladder stone. There is moderate left-sided proximal hydronephrosis and hydroureter with an obstructing 7 mm stone. No perinephric fluid collections.     MACRO: None   Signed by: Trev Michaud 11/24/2023 9:54 PM Dictation workstation:   [id number]      Assessment/Plan     Acute kidney injury  With improving renal chemistries  Nephrolithiasis bilaterally  Urinary sepsis  Iron deficiency anemia  Bilateral nephrolithiasis  Hypertension  Proteinuria  Plan; continue current therapy  1 dose of IV iron   Start patient on metoprolol  We will add angiotensin receptor blocker once the patient renal chemistries are at baseline (losartan)     Thank You Very Much for allowing me to participate in this patient care    Efe Orellana MD

## 2023-11-28 NOTE — CARE PLAN
The patient's goals for the shift include        Problem: Skin  Goal: Promote skin healing  Outcome: Progressing  Flowsheets (Taken 11/28/2023 6189)  Promote skin healing:   Turn/reposition every 2 hours/use positioning/transfer devices   Assess skin/pad under line(s)/device(s)

## 2023-11-28 NOTE — PROGRESS NOTES
Dionte Sharpe is a 53 y.o. male on day 3 of admission presenting with Kidney stone on left side.      Subjective   Seen today so far he has multidrug resistant growth and treating it getting it under control treating his Klebsiella it sensitive to Zosyn he also has Pseudomonas growing in his tracheal aspirate and then in his urine culture as well he does grow MDR scale a lot of leukocytes treating him through with the piperacillin/tazobactam for now probably until the end of the week he is postop day 3 of nephrostomy tube insertion and nephrology is following him as well       Objective     Last Recorded Vitals  /69 (Patient Position: Lying)   Pulse 89   Temp 36.4 °C (97.5 °F) (Temporal)   Resp 20   Wt 99.8 kg (220 lb)   SpO2 92%   Intake/Output last 3 Shifts:    Intake/Output Summary (Last 24 hours) at 11/28/2023 1548  Last data filed at 11/28/2023 1249  Gross per 24 hour   Intake 1018.75 ml   Output 1625 ml   Net -606.25 ml       Admission Weight  Weight: 99.8 kg (220 lb) (11/24/23 1741)    Daily Weight  11/24/23 : 99.8 kg (220 lb)    Image Results  US renal complete  Narrative: Interpreted By:  Soy Diaz,   STUDY:  US RENAL COMPLETE;  11/27/2023 4:22 pm      INDICATION:  Signs/Symptoms:renal failure.      COMPARISON:  None.      ACCESSION NUMBER(S):  [id number]      ORDERING CLINICIAN:  JEREMIAH CALVERT      TECHNIQUE:  Multiple images of the kidneys were obtained  , with color Doppler  for blood flow.      FINDINGS:  RIGHT KIDNEY:  The right kidney measures 13.3 cm in length.  The renal cortex is  within normal limits.   There are multiple echogenicities indicating  nephrolithiasis. The largest measures approximately 1 cm at the  midpole. No hydronephrosis. Color Doppler demonstrates renal blood  flow.      LEFT KIDNEY:  The left kidney measures 14 cm in length.  The renal cortex is within  normal limits.  There also multiple intrarenal calculi, the largest  measuring approximately 1 cm at the  lower pole. No hydronephrosis.  Color Doppler demonstrates renal blood flow.      BLADDER:  An intraluminal echogenicity with shadowing measuring 2.4 cm would  indicate a calculus. Mural thickening measuring 7 mm is probably due  to underdistention, but cystitis is possible.      OTHER FINDINGS:  None significant.      Impression: Bilateral nonobstructing nephrolithiasis.  Urinary bladder calculus. Nonspecific mural thickening of the urinary  bladder, but most likely from underdistention.      Signed by: Soy Diaz 11/27/2023 5:28 PM  Dictation workstation:   [id number]  In his review of systems he has chronic pain generalized previous fever no shortness of breath he has psychiatric issues and yells out    Physical Exam  Lungs are very diminished anteriorly heart has an increased rate regular rhythm abdomen is soft is not distended or firm trach site looks clean minimal secretions  Relevant Results               Assessment/Plan   This patient currently has cardiac telemetry ordered; if you would like to modify or discontinue the telemetry order, click here to go to the orders activity to modify/discontinue the order.              Principal Problem:    Kidney stone on left side    Urinary tract infection with multidrug-resistant organisms growing Klebsiella that is multidrug resistant but it is sensitive to Zosyn as well and his tracheal aspirate he has Pseudomonas growing which is multidrug-resistant I do not have an identification or susceptibility chart yet but I am treating him with Zosyn hopefully it should be sensitive to that Rand continue to treat his condition with that so far I Rand repeat the CBC and his electrolytes continue with the same present care and therapy and treatment monitor for any fever clear out his secretions and maintain his oxygen saturations  Left obstructing ureteral calculus with urosepsis  -POD #3 IR placement of percutaneous nephrostomy tube.  He has multiple problems and  bacteria growth both in his lungs and his urine treating him for those he has a nephrotic range proteinuria as well continue with close monitoring and support repeat the CBC and the electrolytes monitor the renal function continue with all other present management and medications thank you              Eddie Rodriguez DO

## 2023-11-29 LAB
BACTERIA FLD CULT: ABNORMAL
BACTERIA FLD CULT: ABNORMAL
CALCIUM (MG/L) IN 24 HOUR URINE: 32 MG/24H (ref 100–300)
CALCIUM 24H UR-MRATE: 2.3 MG/DL
COLLECT DURATION TIME SPEC: 24 HRS
COLLECT DURATION TIME SPEC: 24 HRS
CREAT 24H UR-MCNC: 34.8 MG/DL (ref 20–370)
CREAT 24H UR-MCNC: 34.8 MG/DL (ref 20–370)
CREAT 24H UR-MRATE: 0.49 G/24 H (ref 0.87–2.41)
CREAT 24H UR-MRATE: 0.49 G/24 H (ref 0.87–2.41)
GLUCOSE BLD MANUAL STRIP-MCNC: 166 MG/DL (ref 74–99)
GLUCOSE BLD MANUAL STRIP-MCNC: 193 MG/DL (ref 74–99)
GLUCOSE BLD MANUAL STRIP-MCNC: 195 MG/DL (ref 74–99)
GLUCOSE BLD MANUAL STRIP-MCNC: 209 MG/DL (ref 74–99)
GRAM STN SPEC: ABNORMAL
GRAM STN SPEC: ABNORMAL
SPECIMEN VOL 24H UR: 1400 ML
SPECIMEN VOL 24H UR: 1400 ML
URATE 24H UR-MCNC: 38 MG/DL
URIC ACID 24 HOUR URINE: 532 MG/24H (ref 150–990)

## 2023-11-29 PROCEDURE — 2500000001 HC RX 250 WO HCPCS SELF ADMINISTERED DRUGS (ALT 637 FOR MEDICARE OP): Performed by: INTERNAL MEDICINE

## 2023-11-29 PROCEDURE — 2500000002 HC RX 250 W HCPCS SELF ADMINISTERED DRUGS (ALT 637 FOR MEDICARE OP, ALT 636 FOR OP/ED): Performed by: NURSE PRACTITIONER

## 2023-11-29 PROCEDURE — 2060000001 HC INTERMEDIATE ICU ROOM DAILY

## 2023-11-29 PROCEDURE — 2500000004 HC RX 250 GENERAL PHARMACY W/ HCPCS (ALT 636 FOR OP/ED): Performed by: INTERNAL MEDICINE

## 2023-11-29 PROCEDURE — 2500000001 HC RX 250 WO HCPCS SELF ADMINISTERED DRUGS (ALT 637 FOR MEDICARE OP): Performed by: NURSE PRACTITIONER

## 2023-11-29 PROCEDURE — 2500000004 HC RX 250 GENERAL PHARMACY W/ HCPCS (ALT 636 FOR OP/ED): Performed by: NURSE PRACTITIONER

## 2023-11-29 PROCEDURE — 82947 ASSAY GLUCOSE BLOOD QUANT: CPT

## 2023-11-29 PROCEDURE — 94640 AIRWAY INHALATION TREATMENT: CPT

## 2023-11-29 RX ADMIN — SERTRALINE HYDROCHLORIDE 50 MG: 50 TABLET ORAL at 08:45

## 2023-11-29 RX ADMIN — IPRATROPIUM BROMIDE AND ALBUTEROL SULFATE 3 ML: 2.5; .5 SOLUTION RESPIRATORY (INHALATION) at 08:03

## 2023-11-29 RX ADMIN — LACOSAMIDE 100 MG: 100 TABLET, FILM COATED ORAL at 08:46

## 2023-11-29 RX ADMIN — Medication 100 MG: at 08:55

## 2023-11-29 RX ADMIN — Medication 1 TABLET: at 20:31

## 2023-11-29 RX ADMIN — HYDROCORTISONE: 10 LOTION TOPICAL at 08:53

## 2023-11-29 RX ADMIN — INSULIN LISPRO 2 UNITS: 100 INJECTION, SOLUTION INTRAVENOUS; SUBCUTANEOUS at 12:43

## 2023-11-29 RX ADMIN — INSULIN LISPRO 1 UNITS: 100 INJECTION, SOLUTION INTRAVENOUS; SUBCUTANEOUS at 18:13

## 2023-11-29 RX ADMIN — FOLIC ACID 1 MG: 1 TABLET ORAL at 08:46

## 2023-11-29 RX ADMIN — LACOSAMIDE 100 MG: 100 TABLET, FILM COATED ORAL at 20:31

## 2023-11-29 RX ADMIN — METOPROLOL SUCCINATE 50 MG: 50 TABLET, EXTENDED RELEASE ORAL at 08:45

## 2023-11-29 RX ADMIN — OXYCODONE HYDROCHLORIDE AND ACETAMINOPHEN 1 TABLET: 5; 325 TABLET ORAL at 20:31

## 2023-11-29 RX ADMIN — OXYCODONE HYDROCHLORIDE AND ACETAMINOPHEN 1 TABLET: 5; 325 TABLET ORAL at 01:40

## 2023-11-29 RX ADMIN — ESOMEPRAZOLE MAGNESIUM 40 MG: 40 FOR SUSPENSION ORAL at 08:45

## 2023-11-29 RX ADMIN — INSULIN LISPRO 1 UNITS: 100 INJECTION, SOLUTION INTRAVENOUS; SUBCUTANEOUS at 08:59

## 2023-11-29 RX ADMIN — DOCUSATE SODIUM 100 MG: 100 CAPSULE, LIQUID FILLED ORAL at 08:46

## 2023-11-29 RX ADMIN — PIPERACILLIN SODIUM AND TAZOBACTAM SODIUM 3.38 G: 3; .375 INJECTION, SOLUTION INTRAVENOUS at 05:11

## 2023-11-29 RX ADMIN — PIPERACILLIN SODIUM AND TAZOBACTAM SODIUM 3.38 G: 3; .375 INJECTION, SOLUTION INTRAVENOUS at 12:43

## 2023-11-29 RX ADMIN — HYDROCORTISONE: 10 LOTION TOPICAL at 20:31

## 2023-11-29 RX ADMIN — BUDESONIDE 0.5 MG: 0.5 INHALANT ORAL at 18:21

## 2023-11-29 RX ADMIN — LEVETIRACETAM 750 MG: 500 TABLET, FILM COATED ORAL at 08:45

## 2023-11-29 RX ADMIN — DOCUSATE SODIUM 100 MG: 100 CAPSULE, LIQUID FILLED ORAL at 20:30

## 2023-11-29 RX ADMIN — LORATADINE 5 MG: 10 TABLET ORAL at 08:46

## 2023-11-29 RX ADMIN — BUDESONIDE 0.5 MG: 0.5 INHALANT ORAL at 08:04

## 2023-11-29 RX ADMIN — Medication 5 DROP: at 20:31

## 2023-11-29 RX ADMIN — CASTOR OIL AND BALSAM, PERU: 788; 87 OINTMENT TOPICAL at 08:53

## 2023-11-29 RX ADMIN — PIPERACILLIN SODIUM AND TAZOBACTAM SODIUM 3.38 G: 3; .375 INJECTION, SOLUTION INTRAVENOUS at 23:08

## 2023-11-29 RX ADMIN — OXYCODONE HYDROCHLORIDE AND ACETAMINOPHEN 1 TABLET: 5; 325 TABLET ORAL at 08:45

## 2023-11-29 RX ADMIN — CASTOR OIL AND BALSAM, PERU: 788; 87 OINTMENT TOPICAL at 20:31

## 2023-11-29 RX ADMIN — LEVETIRACETAM 750 MG: 500 TABLET, FILM COATED ORAL at 20:30

## 2023-11-29 RX ADMIN — Medication 5 DROP: at 08:52

## 2023-11-29 RX ADMIN — Medication 1 TABLET: at 08:45

## 2023-11-29 RX ADMIN — PIPERACILLIN SODIUM AND TAZOBACTAM SODIUM 3.38 G: 3; .375 INJECTION, SOLUTION INTRAVENOUS at 18:13

## 2023-11-29 ASSESSMENT — COGNITIVE AND FUNCTIONAL STATUS - GENERAL
TOILETING: A LOT
TOILETING: A LOT
TURNING FROM BACK TO SIDE WHILE IN FLAT BAD: A LOT
MOVING FROM LYING ON BACK TO SITTING ON SIDE OF FLAT BED WITH BEDRAILS: A LOT
DRESSING REGULAR UPPER BODY CLOTHING: A LOT
CLIMB 3 TO 5 STEPS WITH RAILING: TOTAL
DRESSING REGULAR LOWER BODY CLOTHING: A LOT
WALKING IN HOSPITAL ROOM: TOTAL
DRESSING REGULAR UPPER BODY CLOTHING: A LOT
STANDING UP FROM CHAIR USING ARMS: TOTAL
PERSONAL GROOMING: A LOT
MOBILITY SCORE: 8
MOVING FROM LYING ON BACK TO SITTING ON SIDE OF FLAT BED WITH BEDRAILS: A LOT
DAILY ACTIVITIY SCORE: 14
DRESSING REGULAR LOWER BODY CLOTHING: A LOT
DAILY ACTIVITIY SCORE: 14
PERSONAL GROOMING: A LOT
WALKING IN HOSPITAL ROOM: TOTAL
STANDING UP FROM CHAIR USING ARMS: TOTAL
TURNING FROM BACK TO SIDE WHILE IN FLAT BAD: A LOT
MOVING TO AND FROM BED TO CHAIR: TOTAL
HELP NEEDED FOR BATHING: A LOT
MOVING TO AND FROM BED TO CHAIR: TOTAL
CLIMB 3 TO 5 STEPS WITH RAILING: TOTAL
MOBILITY SCORE: 8
HELP NEEDED FOR BATHING: A LOT

## 2023-11-29 ASSESSMENT — PAIN SCALES - GENERAL
PAINLEVEL_OUTOF10: 0 - NO PAIN
PAINLEVEL_OUTOF10: 0 - NO PAIN
PAINLEVEL_OUTOF10: 8

## 2023-11-29 ASSESSMENT — PAIN - FUNCTIONAL ASSESSMENT
PAIN_FUNCTIONAL_ASSESSMENT: 0-10
PAIN_FUNCTIONAL_ASSESSMENT: 0-10

## 2023-11-29 ASSESSMENT — PAIN DESCRIPTION - LOCATION: LOCATION: BACK

## 2023-11-29 NOTE — CARE PLAN
The patient's goals for the shift include      The clinical goals for the shift include To report less flank pain.

## 2023-11-29 NOTE — PROGRESS NOTES
"Dionte Sharpe is a 53 y.o. male on day 4 of admission presenting with Kidney stone on left side.    Subjective   Follow-up left obstructing ureteral calculus with urosepsis s/p IR placement of nephrostomy tube.  Also has bilateral renal calculi and a bladder calculus.  Patient has left flank pain, renal US without hydro.  Nephrostomy tube and voiding with yellow clear urine.       Objective     Physical Exam  Vitals and nursing note reviewed.   Constitutional:       General: He is awake. He is not in acute distress.  Genitourinary:     Comments: Nephrostomy to CD with urine yellow clear.  Neurological:      Mental Status: He is alert and oriented to person, place, and time.   Psychiatric:         Behavior: Behavior is cooperative.   Last Recorded Vitals  Blood pressure 139/83, pulse 93, temperature 36 °C (96.8 °F), temperature source Temporal, resp. rate 20, height 1.803 m (5' 11\"), weight 99.8 kg (220 lb), SpO2 96 %.  Intake/Output last 3 Shifts:  I/O last 3 completed shifts:  In: 1168.8 (11.7 mL/kg) [I.V.:868.8 (8.7 mL/kg); IV Piggyback:300]  Out: 3375 (33.8 mL/kg) [Urine:3175 (0.9 mL/kg/hr); Stool:200]  Weight: 99.8 kg     Relevant Results  Scheduled medications  [Held by provider] apixaban, 5 mg, oral, BID  balsam peru-castor oiL, , Topical, BID  budesonide, 0.5 mg, nebulization, BID  carbamide peroxide, 5 drop, Right Ear, BID  cholecalciferol, 5,000 Units, oral, Once per day on Tue Thu  docusate sodium, 100 mg, oral, BID  esomeprazole, 40 mg, nasoduodenal tube, Daily before breakfast  folic acid, 1 mg, oral, Daily  hydrocortisone, , Topical, BID  insulin lispro, 0-5 Units, subcutaneous, TID with meals  iohexol, 10 mL, miscellaneous, Once in imaging  lacosamide, 100 mg, oral, q12h [facility]  lactobacillus acidophilus, 1 tablet, oral, BID  levETIRAcetam, 750 mg, oral, BID  loratadine, 5 mg, oral, Daily  metoprolol succinate XL, 50 mg, oral, Daily  oxyCODONE-acetaminophen, 1 tablet, oral, BID  piperacillin-tazobactam, " 3.375 g, intravenous, q6h  pyridoxine, 100 mg, oral, Daily  sertraline, 50 mg, oral, Daily      Continuous medications  dextrose 5 % and lactated Ringer's, 75 mL/hr, Last Rate: 75 mL/hr (11/28/23 0559)      PRN medications  PRN medications: acetaminophen, baclofen, cyclobenzaprine, dextromethorphan-guaifenesin, dextrose 10 % in water (D10W), dextrose, glucagon, hydrOXYzine pamoate, [Held by provider] ibuprofen, ipratropium-albuteroL, LORazepam, mineral oil-hydrophilic petrolatum, ondansetron ODT **OR** ondansetron, oxyCODONE-acetaminophen    Results for orders placed or performed during the hospital encounter of 11/24/23 (from the past 24 hour(s))   POCT GLUCOSE   Result Value Ref Range    POCT Glucose 214 (H) 74 - 99 mg/dL   POCT GLUCOSE   Result Value Ref Range    POCT Glucose 200 (H) 74 - 99 mg/dL   Calcium, 24 Hour Urine   Result Value Ref Range    Collection period 24 hrs    Urine Volume 1,400 mL    Calcium, Urine 2.3 mg/dL    Calcium, 24 Hour Urine 32 (L) 100 - 300 mg/24H    Creatinine, Urine 34.8 20.0 - 370.0 mg/dL    Creatinine, 24 Hour Urine 0.49 (L) 0.87 - 2.41 g/24 h   Uric Acid, 24 Hour Urine   Result Value Ref Range    Collection period 24 hrs    Urine Volume 1,400 mL    Uric Acid, Urine 38 mg/dL    Uric Acid, 24 Hour Urine 532 150 - 990 mg/24h    Creatinine, Urine 34.8 20.0 - 370.0 mg/dL    Creatinine, 24 Hour Urine 0.49 (L) 0.87 - 2.41 g/24 h   POCT GLUCOSE   Result Value Ref Range    POCT Glucose 188 (H) 74 - 99 mg/dL   POCT GLUCOSE   Result Value Ref Range    POCT Glucose 193 (H) 74 - 99 mg/dL       US renal complete    Result Date: 11/27/2023  Interpreted By:  Soy Diaz, STUDY: US RENAL COMPLETE;  11/27/2023 4:22 pm   INDICATION: Signs/Symptoms:renal failure.   COMPARISON: None.   ACCESSION NUMBER(S): [id number]   ORDERING CLINICIAN: JEREMIAH CALVERT   TECHNIQUE: Multiple images of the kidneys were obtained  , with color Doppler for blood flow.   FINDINGS: RIGHT KIDNEY: The right kidney  measures 13.3 cm in length.  The renal cortex is within normal limits.   There are multiple echogenicities indicating nephrolithiasis. The largest measures approximately 1 cm at the midpole. No hydronephrosis. Color Doppler demonstrates renal blood flow.   LEFT KIDNEY: The left kidney measures 14 cm in length.  The renal cortex is within normal limits.  There also multiple intrarenal calculi, the largest measuring approximately 1 cm at the lower pole. No hydronephrosis. Color Doppler demonstrates renal blood flow.   BLADDER: An intraluminal echogenicity with shadowing measuring 2.4 cm would indicate a calculus. Mural thickening measuring 7 mm is probably due to underdistention, but cystitis is possible.   OTHER FINDINGS: None significant.       Bilateral nonobstructing nephrolithiasis. Urinary bladder calculus. Nonspecific mural thickening of the urinary bladder, but most likely from underdistention.   Signed by: Soy Diaz 11/27/2023 5:28 PM Dictation workstation:   [id number]    Vascular US upper extremity venous duplex left    Result Date: 11/26/2023  Interpreted By:  Honorio Newsome, STUDY: [facility] US UPPER EXTREMITY VENOUS DUPLEX LEFT;  11/26/2023 8:17 pm   INDICATION: Signs/Symptoms:swelling.   COMPARISON: None.   ACCESSION NUMBER(S): [id number]   ORDERING CLINICIAN: JEAN PEOPLES   TECHNIQUE: Grayscale, color and spectral Doppler sonographic imaging of the left upper extremity venous system.   FINDINGS: Segmental visualization of the left internal jugular vein, subclavian vein, axillary vein, basilic vein, brachial veins and cephalic vein demonstrate normal gray scale appearance, compressibility, color flow and venous waveforms. Note, only the proximal cephalic vein was visualized.   Subcutaneous edema is noted.       Excluding the mid to distal cephalic vein, which was insufficiently visualized for adequate assessment, no evidence of left upper extremity venous thrombosis.     MACRO: None.   Signed  by: Honorio Jerod 11/26/2023 8:56 PM Dictation workstation:   [id number]    IR placement of nephrostomy catheter    Result Date: 11/25/2023  Interpreted By:  Omid Desir, STUDY: IR GUï¿½NEPHROSTOMY PLACEMENT; ;  11/25/2023 10:55 am 1. LEFT ANTEGRADE NEPHROSTOGRAM 2. ULTRASOUND FLUOROSCOPICALLY GUIDED LEFT PERCUTANEOUS NEPHROSTOMY     INDICATION: Signs/Symptoms:obstructing and infected L ureteral stone, hoping for perc nephrostomy. 53-year-old man with left ureterolithiasis, sepsis.   COMPARISON: CT abdomen pelvis 11/24/2023   ACCESSION NUMBER(S): [id number]   ORDERING CLINICIAN: OSEI KUO   TECHNIQUE:   ATTENDING : Omid Desir M.D.   TECHNICAL DESCRIPTION/FINDINGS: The procedure, including all risks, benefits and alternatives were explained to the patient in detail. All questions were answered and written informed consent was obtained.   The patient was positioned prone on the fluoroscopy table. A time-out was performed.   The left flank was prepped and draped in usual sterile fashion. Focused ultrasound was performed of the left kidney demonstrating mild to moderate left hydronephrosis. Ultrasound images were saved. 1% lidocaine was administered subcutaneously for local anesthesia. Under real-time ultrasound guidance, a 21 gauge access needle was advanced into a dilated left renal calyx. Ultrasound images were saved. Removal of the stylet yielded return of turbid urine. Dilute contrast was injected opacifying the moderately dilated left renal collecting system. Under fluoroscopic visualization, an 018 guidewire was advanced into the upstream aspect of the left ureter and a coaxial introducer was utilized for placement of a parallel 035 working wire. After serial dilation over the 035 wire, a 10 Croatian pigtail drainage catheter functioning as a nephrostomy tube was placed. The cope retention loop was formed in the central left renal collecting system. Completion contrast injection  confirms appropriate positioning.   A 10 cc representative specimen of turbid urine was then collected for microbiological analysis. The newly placed nephrostomy tube was sutured to the skin with 2 0 Prolene stay suture connected to gravity bag drainage. Sterile dressing was applied.   SEDATION/MEDICATIONS: Continuous cardiopulmonary monitoring was performed by a radiology nurse for the duration of the procedure.  1 mg Versed and   50 mcg Fentanyl were administered for moderate conscious sedation time of 30 minutes.      10 cc 1% Lidocaine was administered subcutaneously for local anesthesia. SPECIMENS: 10 cc turbid urine aspirated from the left renal collecting system after nephrostomy tube placement, sent for microbiological analysis. ESTIMATED BLOOD LOSS:  5 cc FLOUROSCOPY:  0.9 minutes; DAP  327.86 uGy-cm*2; Air Kerma  20.0 mGy CONTRAST:   FINDINGS: Test       1. Mild to moderate left hydronephrosis with nephro and ureterolithiasis present. 2. Ultrasound fluoroscopically guided left percutaneous nephrostomy, to gravity drainage. A 10 cc representative specimen of turbid urine was collected for microbiological analysis.     MACRO: None   Signed by: Omid Desir 11/25/2023 12:07 PM Dictation workstation:   [id number]    CT abdomen pelvis w IV contrast    Result Date: 11/24/2023  Interpreted By:  Trev Michaud, STUDY: CT ABDOMEN PELVIS W IV CONTRAST;  11/24/2023 8:58 pm   INDICATION: Signs/Symptoms:LLQ pain, eval for stone vs diverticular disease.   COMPARISON: 02/10/2023   ACCESSION NUMBER(S): [id number]   ORDERING CLINICIAN: KALEB KING   TECHNIQUE: CT of the abdomen and pelvis was performed. Contiguous axial images were obtained at 3 mm slice thickness through the abdomen and pelvis. Coronal and sagittal reconstructions at 3 mm slice thickness were performed.  Intravenous contrast administered   FINDINGS: Body habitus limits sensitivity. Small lung volumes with vascular crowding the lung bases. I  can not exclude mild edema. Correlation with BNP is advised.   Fatty infiltration of the liver. Unremarkable pancreas. Stable spleen. Multiple bilateral nephroliths. Percutaneous nephrostomy tubes have been removed. Left greater than right enhancement of the collecting system. Certainly infection not excluded. There is a 7 mm left proximal ureteral stone detected causing upstream hydronephrosis. Clustered calcifications seen in the left kidney. Reference left lower pole 1.1 cm, left lower pole 1.7 cm, left midpole 1.8 cm clustered calcification. Additional calcifications seen in the upper poles. Multiple right-sided stones are also seen with the largest clustered measuring 3 x 1.1 cm. No hydronephrosis seen on the right. There is a bladder stone measuring 1.8 x 1.7 cm. Significant rectus diastasis again noted. Prostate gland is heterogeneous. Urinary bladder not particularly distended. No pelvic adenopathy. Scoliosis and mild multilevel degenerative disc disease.       1.  Abnormal examination. Body habitus limits sensitivity. Features may reflect pulmonary edema with vascular crowding in the lung bases. Correlation with volume status advised. 2. Numerous bilateral large renal cysts as well as a large bladder stone. There is moderate left-sided proximal hydronephrosis and hydroureter with an obstructing 7 mm stone. No perinephric fluid collections.     MACRO: None   Signed by: Trev Michaud 11/24/2023 9:54 PM Dictation workstation:   [id number]           This patient currently has cardiac telemetry ordered; if you would like to modify or discontinue the telemetry order, click here to go to the orders activity to modify/discontinue the order.                 Assessment/Plan   Principal Problem:    Kidney stone on left side    F/U Left obstructing ureteral calculus with urosepsis  -POD #4 IR placement of percutaneous nephrostomy tube.  -Blood culture positive, abx per Medicine.  -Definitive tx of stone to be  determined as an outpatient once full course of abx completed.         I spent 15 minutes in the professional and overall care of this patient.      Corbin Gallo PA-C

## 2023-11-29 NOTE — PROGRESS NOTES
Spiritual Care Visit    Clinical Encounter Type  Visited With: Patient  Routine Visit: Introduction  Continue Visiting: Yes    Yarsani Encounters  Yarsani Needs: Spiritual care brochure, Prayer         found patient in fragile condition, sedate and non verbal.  Prayer and blessing spoken.  Additional visits advised.

## 2023-11-29 NOTE — PROGRESS NOTES
"Dionte Sharpe is a 53 y.o. male on day 4 of admission presenting with Kidney stone on left side.    Subjective   Seen today continuing to treat his septic state he has bilateral nephrolithiasis acute kidney injury but he has improving renal function nephrostomy tube is in place I have reviewed the labs so far he has chronic pain with That under control as well       Objective   In the review of systems is chronic pain weakness no fever  Physical Exam  Lungs are clear heart has regular rate and rhythm  Abdomen is soft is not distended or firm  Last Recorded Vitals  Blood pressure 131/82, pulse 93, temperature 35.3 °C (95.5 °F), resp. rate 20, height 1.803 m (5' 11\"), weight 99.8 kg (220 lb), SpO2 96 %.  Intake/Output last 3 Shifts:  I/O last 3 completed shifts:  In: 1168.8 (11.7 mL/kg) [I.V.:868.8 (8.7 mL/kg); IV Piggyback:300]  Out: 3375 (33.8 mL/kg) [Urine:3175 (0.9 mL/kg/hr); Stool:200]  Weight: 99.8 kg     Relevant Results           This patient currently has cardiac telemetry ordered; if you would like to modify or discontinue the telemetry order, click here to go to the orders activity to modify/discontinue the order.                 Assessment/Plan   Principal Problem:    Kidney stone on left side    Sepsis continue treating him with the IV PIP Tazo urinary sepsis is resolving acute kidney injury is getting better he has bilateral nephrolithiasis history of hypertension history of chronic pain keeping his pain under control as well I will look at his labs in the morning and continue with the present treatment and probable discharge and discharge planning would be for Friday thank you       I spent  minutes in the professional and overall care of this patient.      Eddie Rodriguez, DO      "

## 2023-11-29 NOTE — PROGRESS NOTES
Dionte Sharpe is a 53 y.o. male on day 4 of admission presenting with Kidney stone on left side.      Subjective   Patient denies any abdominal pain  24-hour urine for metabolic stone analysis is pending  Renal chemistries are ordered and pending            Objective      General appearance; alert  Neck no JVD no bruit no thyromegaly  Lungs are clear  Heart is rhythmic no gallop no rubs or thrills  Abdomen; active peristalsis no rebound or guarding  ; no CVA tenderness  Extremities; edema 2+  Neurological; pathological reflexes are absent    Vitals 24HR  Heart Rate:  []   Temp:  [35.3 °C (95.5 °F)-36.2 °C (97.2 °F)]   Resp:  [20]   BP: (117-139)/(63-83)   SpO2:  [94 %-98 %]         Intake/Output last 3 Shifts:    Intake/Output Summary (Last 24 hours) at 11/29/2023 1226  Last data filed at 11/29/2023 0541  Gross per 24 hour   Intake 200 ml   Output 1750 ml   Net -1550 ml       Results for orders placed or performed during the hospital encounter of 11/24/23 (from the past 24 hour(s))   POCT GLUCOSE   Result Value Ref Range    POCT Glucose 200 (H) 74 - 99 mg/dL   Calcium, 24 Hour Urine   Result Value Ref Range    Collection period 24 hrs    Urine Volume 1,400 mL    Calcium, Urine 2.3 mg/dL    Calcium, 24 Hour Urine 32 (L) 100 - 300 mg/24H    Creatinine, Urine 34.8 20.0 - 370.0 mg/dL    Creatinine, 24 Hour Urine 0.49 (L) 0.87 - 2.41 g/24 h   Uric Acid, 24 Hour Urine   Result Value Ref Range    Collection period 24 hrs    Urine Volume 1,400 mL    Uric Acid, Urine 38 mg/dL    Uric Acid, 24 Hour Urine 532 150 - 990 mg/24h    Creatinine, Urine 34.8 20.0 - 370.0 mg/dL    Creatinine, 24 Hour Urine 0.49 (L) 0.87 - 2.41 g/24 h   POCT GLUCOSE   Result Value Ref Range    POCT Glucose 188 (H) 74 - 99 mg/dL   POCT GLUCOSE   Result Value Ref Range    POCT Glucose 193 (H) 74 - 99 mg/dL       Assessment/Plan     Acute kidney injury  With improving renal chemistries  Nephrolithiasis bilaterally  Urinary sepsis  Iron deficiency  anemia  Bilateral nephrolithiasis  Hypertension  Proteinuria  Plan; continue current therapy  Labs in the morning       Thank You Very Much for allowing me to participate in this patient care    Efe Orellana MD

## 2023-11-30 LAB
ALBUMIN SERPL BCP-MCNC: 3.2 G/DL (ref 3.4–5)
ALP SERPL-CCNC: 101 U/L (ref 33–120)
ALT SERPL W P-5'-P-CCNC: 26 U/L (ref 10–52)
ANION GAP SERPL CALC-SCNC: 11 MMOL/L (ref 10–20)
AST SERPL W P-5'-P-CCNC: 22 U/L (ref 9–39)
BILIRUB SERPL-MCNC: 0.3 MG/DL (ref 0–1.2)
BUN SERPL-MCNC: 18 MG/DL (ref 6–23)
CALCIUM SERPL-MCNC: 8.6 MG/DL (ref 8.6–10.3)
CHLORIDE SERPL-SCNC: 102 MMOL/L (ref 98–107)
CO2 SERPL-SCNC: 32 MMOL/L (ref 21–32)
CREAT SERPL-MCNC: 1.12 MG/DL (ref 0.5–1.3)
ERYTHROCYTE [DISTWIDTH] IN BLOOD BY AUTOMATED COUNT: 19.1 % (ref 11.5–14.5)
GFR SERPL CREATININE-BSD FRML MDRD: 79 ML/MIN/1.73M*2
GLUCOSE BLD MANUAL STRIP-MCNC: 146 MG/DL (ref 74–99)
GLUCOSE BLD MANUAL STRIP-MCNC: 186 MG/DL (ref 74–99)
GLUCOSE BLD MANUAL STRIP-MCNC: 187 MG/DL (ref 74–99)
GLUCOSE BLD MANUAL STRIP-MCNC: 197 MG/DL (ref 74–99)
GLUCOSE SERPL-MCNC: 163 MG/DL (ref 74–99)
HCT VFR BLD AUTO: 41.3 % (ref 41–52)
HGB BLD-MCNC: 11.3 G/DL (ref 13.5–17.5)
MCH RBC QN AUTO: 21.6 PG (ref 26–34)
MCHC RBC AUTO-ENTMCNC: 27.4 G/DL (ref 32–36)
MCV RBC AUTO: 79 FL (ref 80–100)
NRBC BLD-RTO: 0 /100 WBCS (ref 0–0)
PLATELET # BLD AUTO: 249 X10*3/UL (ref 150–450)
POTASSIUM SERPL-SCNC: 4.4 MMOL/L (ref 3.5–5.3)
PROT SERPL-MCNC: 6 G/DL (ref 6.4–8.2)
RBC # BLD AUTO: 5.24 X10*6/UL (ref 4.5–5.9)
SODIUM SERPL-SCNC: 141 MMOL/L (ref 136–145)
WBC # BLD AUTO: 9 X10*3/UL (ref 4.4–11.3)

## 2023-11-30 PROCEDURE — 36415 COLL VENOUS BLD VENIPUNCTURE: CPT | Performed by: INTERNAL MEDICINE

## 2023-11-30 PROCEDURE — 2060000001 HC INTERMEDIATE ICU ROOM DAILY

## 2023-11-30 PROCEDURE — 85027 COMPLETE CBC AUTOMATED: CPT | Performed by: INTERNAL MEDICINE

## 2023-11-30 PROCEDURE — 2500000004 HC RX 250 GENERAL PHARMACY W/ HCPCS (ALT 636 FOR OP/ED): Performed by: INTERNAL MEDICINE

## 2023-11-30 PROCEDURE — 94640 AIRWAY INHALATION TREATMENT: CPT

## 2023-11-30 PROCEDURE — 2500000002 HC RX 250 W HCPCS SELF ADMINISTERED DRUGS (ALT 637 FOR MEDICARE OP, ALT 636 FOR OP/ED): Performed by: NURSE PRACTITIONER

## 2023-11-30 PROCEDURE — 80053 COMPREHEN METABOLIC PANEL: CPT | Performed by: INTERNAL MEDICINE

## 2023-11-30 PROCEDURE — 2500000001 HC RX 250 WO HCPCS SELF ADMINISTERED DRUGS (ALT 637 FOR MEDICARE OP): Performed by: NURSE PRACTITIONER

## 2023-11-30 PROCEDURE — 82947 ASSAY GLUCOSE BLOOD QUANT: CPT

## 2023-11-30 PROCEDURE — 2500000004 HC RX 250 GENERAL PHARMACY W/ HCPCS (ALT 636 FOR OP/ED): Performed by: NURSE PRACTITIONER

## 2023-11-30 PROCEDURE — 87040 BLOOD CULTURE FOR BACTERIA: CPT | Mod: PARLAB | Performed by: INTERNAL MEDICINE

## 2023-11-30 PROCEDURE — 2500000001 HC RX 250 WO HCPCS SELF ADMINISTERED DRUGS (ALT 637 FOR MEDICARE OP): Performed by: INTERNAL MEDICINE

## 2023-11-30 RX ORDER — METOPROLOL SUCCINATE 50 MG/1
50 TABLET, EXTENDED RELEASE ORAL DAILY
Start: 2023-12-01

## 2023-11-30 RX ORDER — LIDOCAINE HYDROCHLORIDE 10 MG/ML
5 INJECTION INFILTRATION; PERINEURAL ONCE
Status: DISCONTINUED | OUTPATIENT
Start: 2023-11-30 | End: 2023-12-06 | Stop reason: HOSPADM

## 2023-11-30 RX ORDER — L. ACIDOPHILUS/L.BULGARICUS 1MM CELL
1 TABLET ORAL 2 TIMES DAILY
Start: 2023-11-30

## 2023-11-30 RX ORDER — ONDANSETRON 4 MG/1
4 TABLET, ORALLY DISINTEGRATING ORAL EVERY 8 HOURS PRN
Qty: 20 TABLET | Refills: 0
Start: 2023-11-30

## 2023-11-30 RX ORDER — PYRIDOXINE HCL (VITAMIN B6) 100 MG
100 TABLET ORAL DAILY
Start: 2023-12-01

## 2023-11-30 RX ADMIN — CASTOR OIL AND BALSAM, PERU: 788; 87 OINTMENT TOPICAL at 20:03

## 2023-11-30 RX ADMIN — ESOMEPRAZOLE MAGNESIUM 40 MG: 40 FOR SUSPENSION ORAL at 10:36

## 2023-11-30 RX ADMIN — Medication 1 TABLET: at 20:03

## 2023-11-30 RX ADMIN — Medication 100 MG: at 10:34

## 2023-11-30 RX ADMIN — PIPERACILLIN SODIUM AND TAZOBACTAM SODIUM 3.38 G: 3; .375 INJECTION, SOLUTION INTRAVENOUS at 05:09

## 2023-11-30 RX ADMIN — DOCUSATE SODIUM 100 MG: 100 CAPSULE, LIQUID FILLED ORAL at 20:03

## 2023-11-30 RX ADMIN — CASTOR OIL AND BALSAM, PERU: 788; 87 OINTMENT TOPICAL at 10:37

## 2023-11-30 RX ADMIN — SERTRALINE HYDROCHLORIDE 50 MG: 50 TABLET ORAL at 10:33

## 2023-11-30 RX ADMIN — HYDROCORTISONE: 10 LOTION TOPICAL at 09:00

## 2023-11-30 RX ADMIN — Medication 5 DROP: at 10:36

## 2023-11-30 RX ADMIN — PIPERACILLIN SODIUM AND TAZOBACTAM SODIUM 3.38 G: 3; .375 INJECTION, SOLUTION INTRAVENOUS at 13:56

## 2023-11-30 RX ADMIN — LEVETIRACETAM 750 MG: 500 TABLET, FILM COATED ORAL at 20:03

## 2023-11-30 RX ADMIN — FOLIC ACID 1 MG: 1 TABLET ORAL at 10:35

## 2023-11-30 RX ADMIN — SODIUM CHLORIDE, SODIUM LACTATE, POTASSIUM CHLORIDE, CALCIUM CHLORIDE AND DEXTROSE MONOHYDRATE 75 ML/HR: 5; 600; 310; 30; 20 INJECTION, SOLUTION INTRAVENOUS at 13:30

## 2023-11-30 RX ADMIN — SODIUM CHLORIDE, SODIUM LACTATE, POTASSIUM CHLORIDE, CALCIUM CHLORIDE AND DEXTROSE MONOHYDRATE 75 ML/HR: 5; 600; 310; 30; 20 INJECTION, SOLUTION INTRAVENOUS at 13:56

## 2023-11-30 RX ADMIN — OXYCODONE HYDROCHLORIDE AND ACETAMINOPHEN 1 TABLET: 5; 325 TABLET ORAL at 20:03

## 2023-11-30 RX ADMIN — IPRATROPIUM BROMIDE AND ALBUTEROL SULFATE 3 ML: 2.5; .5 SOLUTION RESPIRATORY (INHALATION) at 07:30

## 2023-11-30 RX ADMIN — Medication 1 TABLET: at 10:33

## 2023-11-30 RX ADMIN — LORATADINE 5 MG: 10 TABLET ORAL at 10:34

## 2023-11-30 RX ADMIN — Medication 125 MCG: at 10:33

## 2023-11-30 RX ADMIN — LEVETIRACETAM 750 MG: 500 TABLET, FILM COATED ORAL at 10:32

## 2023-11-30 RX ADMIN — PIPERACILLIN SODIUM AND TAZOBACTAM SODIUM 3.38 G: 3; .375 INJECTION, SOLUTION INTRAVENOUS at 18:00

## 2023-11-30 RX ADMIN — BUDESONIDE 0.5 MG: 0.5 INHALANT ORAL at 07:30

## 2023-11-30 RX ADMIN — DOCUSATE SODIUM 100 MG: 100 CAPSULE, LIQUID FILLED ORAL at 10:35

## 2023-11-30 RX ADMIN — BUDESONIDE 0.5 MG: 0.5 INHALANT ORAL at 18:32

## 2023-11-30 RX ADMIN — METOPROLOL SUCCINATE 50 MG: 50 TABLET, EXTENDED RELEASE ORAL at 10:35

## 2023-11-30 RX ADMIN — HYDROCORTISONE: 10 LOTION TOPICAL at 20:03

## 2023-11-30 RX ADMIN — Medication 5 DROP: at 20:05

## 2023-11-30 RX ADMIN — LACOSAMIDE 100 MG: 100 TABLET, FILM COATED ORAL at 10:34

## 2023-11-30 RX ADMIN — OXYCODONE HYDROCHLORIDE AND ACETAMINOPHEN 1 TABLET: 5; 325 TABLET ORAL at 10:34

## 2023-11-30 RX ADMIN — LACOSAMIDE 100 MG: 100 TABLET, FILM COATED ORAL at 20:03

## 2023-11-30 RX ADMIN — PIPERACILLIN SODIUM AND TAZOBACTAM SODIUM 3.38 G: 3; .375 INJECTION, SOLUTION INTRAVENOUS at 23:09

## 2023-11-30 ASSESSMENT — COGNITIVE AND FUNCTIONAL STATUS - GENERAL
TURNING FROM BACK TO SIDE WHILE IN FLAT BAD: A LOT
DAILY ACTIVITIY SCORE: 14
WALKING IN HOSPITAL ROOM: TOTAL
HELP NEEDED FOR BATHING: A LOT
DRESSING REGULAR LOWER BODY CLOTHING: A LOT
STANDING UP FROM CHAIR USING ARMS: TOTAL
TOILETING: A LOT
CLIMB 3 TO 5 STEPS WITH RAILING: TOTAL
MOBILITY SCORE: 8
PERSONAL GROOMING: A LOT
MOVING TO AND FROM BED TO CHAIR: TOTAL
MOVING FROM LYING ON BACK TO SITTING ON SIDE OF FLAT BED WITH BEDRAILS: A LOT
DRESSING REGULAR UPPER BODY CLOTHING: A LOT

## 2023-11-30 NOTE — PROGRESS NOTES
Dionte Sharpe is a 53 y.o. male on day 5 of admission presenting with Kidney stone on left side.      Subjective   Patient is alert with no complaints  24-hour urine for metabolic stone analysis is still pending.  Renal chemistries are downtrending  Hemoglobin today up to 11.3    Blood pressure stable          Objective      General appearance; alert  Neck no JVD no bruit no thyromegaly  Lungs are clear  Heart is rhythmic no gallop no rubs or thrills  Abdomen; active peristalsis no rebound or guarding  ; no CVA tenderness  Extremities; edema 2+  Neurological; pathological reflexes are absent    Vitals 24HR  Heart Rate:  []   Temp:  [35.3 °C (95.5 °F)-36.5 °C (97.7 °F)]   Resp:  [18-22]   BP: (126-139)/(76-84)   SpO2:  [94 %-98 %]         Intake/Output last 3 Shifts:    Intake/Output Summary (Last 24 hours) at 11/30/2023 1129  Last data filed at 11/30/2023 0539  Gross per 24 hour   Intake 200 ml   Output 900 ml   Net -700 ml       Results for orders placed or performed during the hospital encounter of 11/24/23 (from the past 24 hour(s))   POCT GLUCOSE   Result Value Ref Range    POCT Glucose 209 (H) 74 - 99 mg/dL   POCT GLUCOSE   Result Value Ref Range    POCT Glucose 166 (H) 74 - 99 mg/dL   POCT GLUCOSE   Result Value Ref Range    POCT Glucose 195 (H) 74 - 99 mg/dL   CBC   Result Value Ref Range    WBC 9.0 4.4 - 11.3 x10*3/uL    nRBC 0.0 0.0 - 0.0 /100 WBCs    RBC 5.24 4.50 - 5.90 x10*6/uL    Hemoglobin 11.3 (L) 13.5 - 17.5 g/dL    Hematocrit 41.3 41.0 - 52.0 %    MCV 79 (L) 80 - 100 fL    MCH 21.6 (L) 26.0 - 34.0 pg    MCHC 27.4 (L) 32.0 - 36.0 g/dL    RDW 19.1 (H) 11.5 - 14.5 %    Platelets 249 150 - 450 x10*3/uL   Comprehensive metabolic panel   Result Value Ref Range    Glucose 163 (H) 74 - 99 mg/dL    Sodium 141 136 - 145 mmol/L    Potassium 4.4 3.5 - 5.3 mmol/L    Chloride 102 98 - 107 mmol/L    Bicarbonate 32 21 - 32 mmol/L    Anion Gap 11 10 - 20 mmol/L    Urea Nitrogen 18 6 - 23 mg/dL    Creatinine  1.12 0.50 - 1.30 mg/dL    eGFR 79 >60 mL/min/1.73m*2    Calcium 8.6 8.6 - 10.3 mg/dL    Albumin 3.2 (L) 3.4 - 5.0 g/dL    Alkaline Phosphatase 101 33 - 120 U/L    Total Protein 6.0 (L) 6.4 - 8.2 g/dL    AST 22 9 - 39 U/L    Bilirubin, Total 0.3 0.0 - 1.2 mg/dL    ALT 26 10 - 52 U/L   POCT GLUCOSE   Result Value Ref Range    POCT Glucose 146 (H) 74 - 99 mg/dL       Assessment/Plan     Acute kidney injury  With improving renal chemistries  Nephrolithiasis bilaterally  Urinary sepsis  Iron deficiency anemia  Bilateral nephrolithiasis  Hypertension  Proteinuria  Plan; continue current therapy         Thank You Very Much for allowing me to participate in this patient care    Efe Orellana MD
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
## 2023-11-30 NOTE — PROGRESS NOTES
Dionte Sharpe is a 53 y.o. male on day 5 of admission presenting with Kidney stone on left side.      Subjective   Seen today treating his klebsiella 11/24 culture positive in a continue to treat him through discharge him tomorrow and and treat him out probably for another 14 days       Objective     Last Recorded Vitals  /85   Pulse 106   Temp 36.2 °C (97.2 °F) (Temporal)   Resp 18   Wt 99.8 kg (220 lb)   SpO2 94%   Intake/Output last 3 Shifts:    Intake/Output Summary (Last 24 hours) at 11/30/2023 1431  Last data filed at 11/30/2023 0900  Gross per 24 hour   Intake 150 ml   Output 1300 ml   Net -1150 ml       Admission Weight  Weight: 99.8 kg (220 lb) (11/24/23 1741)    Daily Weight  11/24/23 : 99.8 kg (220 lb)    Image Results  US renal complete  Narrative: Interpreted By:  Soy Diaz,   STUDY:  US RENAL COMPLETE;  11/27/2023 4:22 pm      INDICATION:  Signs/Symptoms:renal failure.      COMPARISON:  None.      ACCESSION NUMBER(S):  [id number]      ORDERING CLINICIAN:  JEREMIAH CALVERT      TECHNIQUE:  Multiple images of the kidneys were obtained  , with color Doppler  for blood flow.      FINDINGS:  RIGHT KIDNEY:  The right kidney measures 13.3 cm in length.  The renal cortex is  within normal limits.   There are multiple echogenicities indicating  nephrolithiasis. The largest measures approximately 1 cm at the  midpole. No hydronephrosis. Color Doppler demonstrates renal blood  flow.      LEFT KIDNEY:  The left kidney measures 14 cm in length.  The renal cortex is within  normal limits.  There also multiple intrarenal calculi, the largest  measuring approximately 1 cm at the lower pole. No hydronephrosis.  Color Doppler demonstrates renal blood flow.      BLADDER:  An intraluminal echogenicity with shadowing measuring 2.4 cm would  indicate a calculus. Mural thickening measuring 7 mm is probably due  to underdistention, but cystitis is possible.      OTHER FINDINGS:  None significant.       Impression: Bilateral nonobstructing nephrolithiasis.  Urinary bladder calculus. Nonspecific mural thickening of the urinary  bladder, but most likely from underdistention.      Signed by: Soy Diaz 11/27/2023 5:28 PM  Dictation workstation:   [id number]  In the review of systems no fever complains of pain on that side with nephrostomy tube stable no fever    Physical Exam  Awake and alert secretions are minimal abdomen is soft he is alert and oriented x3  Relevant Results               Assessment/Plan   This patient currently has cardiac telemetry ordered; if you would like to modify or discontinue the telemetry order, click here to go to the orders activity to modify/discontinue the order.              Principal Problem:    Kidney stone on left side    For now kidney stones improving renal chemistries bilateral nephrolithiasis nephrostomy tube is in place continue with IV antibiotic therapies acute kidney injury resolving.  And for now I will probably treat him out for an additional 14 days of IV antibiotics he can go back to [facility] tomorrow provided repeat blood cultures negative also need follow-up with [facility] urology thank you              Eddie Rodriguez DO

## 2023-11-30 NOTE — CARE PLAN
The patient's goals for the shift include      The clinical goals for the shift include Report less pain

## 2023-12-01 LAB
BACTERIA BLD CULT: NORMAL
BACTERIA BLD CULT: NORMAL
GLUCOSE BLD MANUAL STRIP-MCNC: 167 MG/DL (ref 74–99)
GLUCOSE BLD MANUAL STRIP-MCNC: 198 MG/DL (ref 74–99)
GLUCOSE BLD MANUAL STRIP-MCNC: 198 MG/DL (ref 74–99)
GLUCOSE BLD MANUAL STRIP-MCNC: 206 MG/DL (ref 74–99)
GLUCOSE BLD MANUAL STRIP-MCNC: 226 MG/DL (ref 74–99)

## 2023-12-01 PROCEDURE — 2500000001 HC RX 250 WO HCPCS SELF ADMINISTERED DRUGS (ALT 637 FOR MEDICARE OP): Performed by: INTERNAL MEDICINE

## 2023-12-01 PROCEDURE — 94640 AIRWAY INHALATION TREATMENT: CPT

## 2023-12-01 PROCEDURE — 2500000002 HC RX 250 W HCPCS SELF ADMINISTERED DRUGS (ALT 637 FOR MEDICARE OP, ALT 636 FOR OP/ED): Performed by: NURSE PRACTITIONER

## 2023-12-01 PROCEDURE — 2500000004 HC RX 250 GENERAL PHARMACY W/ HCPCS (ALT 636 FOR OP/ED): Performed by: INTERNAL MEDICINE

## 2023-12-01 PROCEDURE — 82947 ASSAY GLUCOSE BLOOD QUANT: CPT

## 2023-12-01 PROCEDURE — 2060000001 HC INTERMEDIATE ICU ROOM DAILY

## 2023-12-01 PROCEDURE — 2500000001 HC RX 250 WO HCPCS SELF ADMINISTERED DRUGS (ALT 637 FOR MEDICARE OP): Performed by: NURSE PRACTITIONER

## 2023-12-01 PROCEDURE — 2500000004 HC RX 250 GENERAL PHARMACY W/ HCPCS (ALT 636 FOR OP/ED): Performed by: NURSE PRACTITIONER

## 2023-12-01 RX ADMIN — Medication 1 TABLET: at 08:45

## 2023-12-01 RX ADMIN — OXYCODONE HYDROCHLORIDE AND ACETAMINOPHEN 1 TABLET: 5; 325 TABLET ORAL at 08:44

## 2023-12-01 RX ADMIN — PIPERACILLIN SODIUM AND TAZOBACTAM SODIUM 3.38 G: 3; .375 INJECTION, SOLUTION INTRAVENOUS at 12:00

## 2023-12-01 RX ADMIN — LORATADINE 5 MG: 10 TABLET ORAL at 08:45

## 2023-12-01 RX ADMIN — INSULIN LISPRO 1 UNITS: 100 INJECTION, SOLUTION INTRAVENOUS; SUBCUTANEOUS at 09:31

## 2023-12-01 RX ADMIN — METOPROLOL SUCCINATE 50 MG: 50 TABLET, EXTENDED RELEASE ORAL at 08:43

## 2023-12-01 RX ADMIN — INSULIN LISPRO 1 UNITS: 100 INJECTION, SOLUTION INTRAVENOUS; SUBCUTANEOUS at 17:49

## 2023-12-01 RX ADMIN — LEVETIRACETAM 750 MG: 500 TABLET, FILM COATED ORAL at 08:44

## 2023-12-01 RX ADMIN — PIPERACILLIN SODIUM AND TAZOBACTAM SODIUM 3.38 G: 3; .375 INJECTION, SOLUTION INTRAVENOUS at 18:35

## 2023-12-01 RX ADMIN — DOCUSATE SODIUM 100 MG: 100 CAPSULE, LIQUID FILLED ORAL at 08:44

## 2023-12-01 RX ADMIN — HYDROCORTISONE: 10 LOTION TOPICAL at 20:55

## 2023-12-01 RX ADMIN — Medication 100 MG: at 08:43

## 2023-12-01 RX ADMIN — FOLIC ACID 1 MG: 1 TABLET ORAL at 08:44

## 2023-12-01 RX ADMIN — BUDESONIDE 0.5 MG: 0.5 INHALANT ORAL at 06:59

## 2023-12-01 RX ADMIN — ESOMEPRAZOLE MAGNESIUM 40 MG: 40 FOR SUSPENSION ORAL at 08:45

## 2023-12-01 RX ADMIN — Medication 1 TABLET: at 20:56

## 2023-12-01 RX ADMIN — CASTOR OIL AND BALSAM, PERU: 788; 87 OINTMENT TOPICAL at 09:00

## 2023-12-01 RX ADMIN — LACOSAMIDE 100 MG: 100 TABLET, FILM COATED ORAL at 08:43

## 2023-12-01 RX ADMIN — CASTOR OIL AND BALSAM, PERU: 788; 87 OINTMENT TOPICAL at 20:55

## 2023-12-01 RX ADMIN — PIPERACILLIN SODIUM AND TAZOBACTAM SODIUM 3.38 G: 3; .375 INJECTION, SOLUTION INTRAVENOUS at 05:09

## 2023-12-01 RX ADMIN — Medication 5 DROP: at 20:55

## 2023-12-01 RX ADMIN — SODIUM CHLORIDE, SODIUM LACTATE, POTASSIUM CHLORIDE, CALCIUM CHLORIDE AND DEXTROSE MONOHYDRATE 75 ML/HR: 5; 600; 310; 30; 20 INJECTION, SOLUTION INTRAVENOUS at 18:37

## 2023-12-01 RX ADMIN — HYDROCORTISONE: 10 LOTION TOPICAL at 09:00

## 2023-12-01 RX ADMIN — LEVETIRACETAM 750 MG: 500 TABLET, FILM COATED ORAL at 20:56

## 2023-12-01 RX ADMIN — OXYCODONE HYDROCHLORIDE AND ACETAMINOPHEN 1 TABLET: 5; 325 TABLET ORAL at 20:55

## 2023-12-01 RX ADMIN — LACOSAMIDE 100 MG: 100 TABLET, FILM COATED ORAL at 20:56

## 2023-12-01 RX ADMIN — SERTRALINE HYDROCHLORIDE 50 MG: 50 TABLET ORAL at 08:44

## 2023-12-01 ASSESSMENT — COGNITIVE AND FUNCTIONAL STATUS - GENERAL
TOILETING: A LOT
PERSONAL GROOMING: A LOT
DRESSING REGULAR UPPER BODY CLOTHING: A LOT
DRESSING REGULAR LOWER BODY CLOTHING: A LOT
DAILY ACTIVITIY SCORE: 14
WALKING IN HOSPITAL ROOM: TOTAL
STANDING UP FROM CHAIR USING ARMS: TOTAL
CLIMB 3 TO 5 STEPS WITH RAILING: TOTAL
HELP NEEDED FOR BATHING: A LOT
DRESSING REGULAR LOWER BODY CLOTHING: A LOT
CLIMB 3 TO 5 STEPS WITH RAILING: TOTAL
TURNING FROM BACK TO SIDE WHILE IN FLAT BAD: A LOT
MOVING TO AND FROM BED TO CHAIR: TOTAL
DRESSING REGULAR UPPER BODY CLOTHING: A LOT
PERSONAL GROOMING: A LOT
MOVING FROM LYING ON BACK TO SITTING ON SIDE OF FLAT BED WITH BEDRAILS: A LOT
MOBILITY SCORE: 8
WALKING IN HOSPITAL ROOM: TOTAL
MOVING TO AND FROM BED TO CHAIR: TOTAL
DAILY ACTIVITIY SCORE: 14
STANDING UP FROM CHAIR USING ARMS: TOTAL
TURNING FROM BACK TO SIDE WHILE IN FLAT BAD: A LOT
TOILETING: A LOT
MOBILITY SCORE: 8
HELP NEEDED FOR BATHING: A LOT
MOVING FROM LYING ON BACK TO SITTING ON SIDE OF FLAT BED WITH BEDRAILS: A LOT

## 2023-12-01 ASSESSMENT — PAIN SCALES - GENERAL
PAINLEVEL_OUTOF10: 0 - NO PAIN
PAINLEVEL_OUTOF10: 0 - NO PAIN

## 2023-12-01 ASSESSMENT — PAIN - FUNCTIONAL ASSESSMENT: PAIN_FUNCTIONAL_ASSESSMENT: 0-10

## 2023-12-01 NOTE — CARE PLAN
The patient's goals for the shift include  comfort and rest    The clinical goals for the shift include Report less pain

## 2023-12-01 NOTE — PROGRESS NOTES
Dionte Sharpe is a 53 y.o. male on day 6 of admission presenting with Kidney stone on left side.      Subjective   Seen today his blood cultures were positive I have to wait for the repeat blood cultures if they are negative I will go ahead and put a PICC line in him and treat him with Zosyn probably for 14 days IV as an outpatient       Objective     Last Recorded Vitals  /86   Pulse 92   Temp 36 °C (96.8 °F) (Temporal)   Resp 18   Wt 99.8 kg (220 lb)   SpO2 96%   Intake/Output last 3 Shifts:    Intake/Output Summary (Last 24 hours) at 12/1/2023 0910  Last data filed at 12/1/2023 0857  Gross per 24 hour   Intake 200 ml   Output 2100 ml   Net -1900 ml       Admission Weight  Weight: 99.8 kg (220 lb) (11/24/23 1741)    Daily Weight  11/24/23 : 99.8 kg (220 lb)    Image Results  US renal complete  Narrative: Interpreted By:  Soy Diaz,   STUDY:  US RENAL COMPLETE;  11/27/2023 4:22 pm      INDICATION:  Signs/Symptoms:renal failure.      COMPARISON:  None.      ACCESSION NUMBER(S):  [id number]      ORDERING CLINICIAN:  JEREMIAH CALVERT      TECHNIQUE:  Multiple images of the kidneys were obtained  , with color Doppler  for blood flow.      FINDINGS:  RIGHT KIDNEY:  The right kidney measures 13.3 cm in length.  The renal cortex is  within normal limits.   There are multiple echogenicities indicating  nephrolithiasis. The largest measures approximately 1 cm at the  midpole. No hydronephrosis. Color Doppler demonstrates renal blood  flow.      LEFT KIDNEY:  The left kidney measures 14 cm in length.  The renal cortex is within  normal limits.  There also multiple intrarenal calculi, the largest  measuring approximately 1 cm at the lower pole. No hydronephrosis.  Color Doppler demonstrates renal blood flow.      BLADDER:  An intraluminal echogenicity with shadowing measuring 2.4 cm would  indicate a calculus. Mural thickening measuring 7 mm is probably due  to underdistention, but cystitis is possible.       OTHER FINDINGS:  None significant.      Impression: Bilateral nonobstructing nephrolithiasis.  Urinary bladder calculus. Nonspecific mural thickening of the urinary  bladder, but most likely from underdistention.      Signed by: Soy Diaz 11/27/2023 5:28 PM  Dictation workstation:   [id number]    In the review of systems he still having generalized pain but he has chronic pain chronic pain syndrome no fever no vomiting or diarrhea  Physical Exam  Awake and alert and oriented lungs are clear heart has a regular rate and rhythm  Relevant Results               Assessment/Plan   This patient currently has cardiac telemetry ordered; if you would like to modify or discontinue the telemetry order, click here to go to the orders activity to modify/discontinue the order.              Principal Problem:    Kidney stone on left side    So far waiting for the repeat blood cultures and then if those are negative PICC line insertion and back to the nursing care facility on a 14-day course thank you also nephrostomy tube is in place will need to follow-up with [facility] urology as an outpatient to see what they want to do with that probably within the month so elected to have arrangements made prior to discharge for that as well and is being followed up for his acute kidney injury with nephrology thank you              Eddie Rodriguez,

## 2023-12-01 NOTE — PROGRESS NOTES
Dionte Sharpe is a 53 y.o. male on day 6 of admission presenting with Kidney stone on left side.      Subjective   Patient is alert with no complaints  24-hour urine for metabolic stone analysis is still pending.  Renal chemistries are downtrending      Blood pressure stable          Objective      General appearance; alert  Neck no JVD no bruit no thyromegaly  Lungs are clear  Heart is rhythmic no gallop no rubs or thrills  Abdomen; active peristalsis no rebound or guarding  ; no CVA tenderness  Extremities; edema 2+  Neurological; pathological reflexes are absent    Vitals 24HR  Heart Rate:  []   Temp:  [35.6 °C (96.1 °F)-36.8 °C (98.2 °F)]   Resp:  [18-20]   BP: (128-156)/(79-88)   SpO2:  [87 %-98 %]         Intake/Output last 3 Shifts:    Intake/Output Summary (Last 24 hours) at 12/1/2023 1648  Last data filed at 12/1/2023 1556  Gross per 24 hour   Intake 150 ml   Output 2570 ml   Net -2420 ml       Results for orders placed or performed during the hospital encounter of 11/24/23 (from the past 24 hour(s))   POCT GLUCOSE   Result Value Ref Range    POCT Glucose 197 (H) 74 - 99 mg/dL   POCT GLUCOSE   Result Value Ref Range    POCT Glucose 167 (H) 74 - 99 mg/dL   POCT GLUCOSE   Result Value Ref Range    POCT Glucose 198 (H) 74 - 99 mg/dL   POCT GLUCOSE   Result Value Ref Range    POCT Glucose 206 (H) 74 - 99 mg/dL   POCT GLUCOSE   Result Value Ref Range    POCT Glucose 198 (H) 74 - 99 mg/dL       Assessment/Plan     Acute kidney injury  With improving renal chemistries  Nephrolithiasis bilaterally  Urinary sepsis  Iron deficiency anemia  Bilateral nephrolithiasis  Hypertension  Proteinuria  Plan; continue current therapy  Awaiting results of metabolic stone survey       Thank You Very Much for allowing me to participate in this patient care    Efe Orellana MD

## 2023-12-02 ENCOUNTER — APPOINTMENT (OUTPATIENT)
Dept: RADIOLOGY | Facility: HOSPITAL | Age: 53
End: 2023-12-02
Payer: COMMERCIAL

## 2023-12-02 LAB
CITRATE 24H UR-MRATE: 49 MG/D (ref 320–1240)
CITRATE UR-MCNC: 35 MG/L
CITRATE/CREAT UR: 97 MG/G
COLLECT DURATION TIME SPEC: 24 HR
CREAT 24H UR-MRATE: 504 MG/D (ref 800–2100)
CREAT UR-MCNC: 36 MG/DL
ERYTHROCYTE [DISTWIDTH] IN BLOOD BY AUTOMATED COUNT: 19.5 % (ref 11.5–14.5)
GLUCOSE BLD MANUAL STRIP-MCNC: 186 MG/DL (ref 74–99)
GLUCOSE BLD MANUAL STRIP-MCNC: 215 MG/DL (ref 74–99)
GLUCOSE BLD MANUAL STRIP-MCNC: 229 MG/DL (ref 74–99)
HCT VFR BLD AUTO: 33.2 % (ref 41–52)
HGB BLD-MCNC: 8.5 G/DL (ref 13.5–17.5)
MCH RBC QN AUTO: 21.9 PG (ref 26–34)
MCHC RBC AUTO-ENTMCNC: 25.6 G/DL (ref 32–36)
MCV RBC AUTO: 85 FL (ref 80–100)
NRBC BLD-RTO: 0 /100 WBCS (ref 0–0)
PLATELET # BLD AUTO: 193 X10*3/UL (ref 150–450)
RBC # BLD AUTO: 3.89 X10*6/UL (ref 4.5–5.9)
SPECIMEN VOL ?TM UR: 1400 ML
WBC # BLD AUTO: 7.3 X10*3/UL (ref 4.4–11.3)

## 2023-12-02 PROCEDURE — 94640 AIRWAY INHALATION TREATMENT: CPT

## 2023-12-02 PROCEDURE — 2060000001 HC INTERMEDIATE ICU ROOM DAILY

## 2023-12-02 PROCEDURE — 2500000002 HC RX 250 W HCPCS SELF ADMINISTERED DRUGS (ALT 637 FOR MEDICARE OP, ALT 636 FOR OP/ED): Performed by: NURSE PRACTITIONER

## 2023-12-02 PROCEDURE — 82947 ASSAY GLUCOSE BLOOD QUANT: CPT

## 2023-12-02 PROCEDURE — 2500000001 HC RX 250 WO HCPCS SELF ADMINISTERED DRUGS (ALT 637 FOR MEDICARE OP): Performed by: INTERNAL MEDICINE

## 2023-12-02 PROCEDURE — 71045 X-RAY EXAM CHEST 1 VIEW: CPT

## 2023-12-02 PROCEDURE — 2500000004 HC RX 250 GENERAL PHARMACY W/ HCPCS (ALT 636 FOR OP/ED): Performed by: INTERNAL MEDICINE

## 2023-12-02 PROCEDURE — 85027 COMPLETE CBC AUTOMATED: CPT | Performed by: INTERNAL MEDICINE

## 2023-12-02 PROCEDURE — 71045 X-RAY EXAM CHEST 1 VIEW: CPT | Performed by: RADIOLOGY

## 2023-12-02 PROCEDURE — 36415 COLL VENOUS BLD VENIPUNCTURE: CPT | Performed by: INTERNAL MEDICINE

## 2023-12-02 PROCEDURE — 2500000001 HC RX 250 WO HCPCS SELF ADMINISTERED DRUGS (ALT 637 FOR MEDICARE OP): Performed by: NURSE PRACTITIONER

## 2023-12-02 PROCEDURE — 2500000004 HC RX 250 GENERAL PHARMACY W/ HCPCS (ALT 636 FOR OP/ED): Performed by: NURSE PRACTITIONER

## 2023-12-02 RX ADMIN — PIPERACILLIN SODIUM AND TAZOBACTAM SODIUM 3.38 G: 3; .375 INJECTION, SOLUTION INTRAVENOUS at 17:37

## 2023-12-02 RX ADMIN — IPRATROPIUM BROMIDE AND ALBUTEROL SULFATE 3 ML: 2.5; .5 SOLUTION RESPIRATORY (INHALATION) at 07:00

## 2023-12-02 RX ADMIN — SODIUM CHLORIDE, SODIUM LACTATE, POTASSIUM CHLORIDE, CALCIUM CHLORIDE AND DEXTROSE MONOHYDRATE 75 ML/HR: 5; 600; 310; 30; 20 INJECTION, SOLUTION INTRAVENOUS at 23:00

## 2023-12-02 RX ADMIN — Medication 1 TABLET: at 08:51

## 2023-12-02 RX ADMIN — BUDESONIDE 0.5 MG: 0.5 INHALANT ORAL at 18:18

## 2023-12-02 RX ADMIN — BACLOFEN 15 MG: 10 TABLET ORAL at 04:21

## 2023-12-02 RX ADMIN — BACLOFEN 15 MG: 10 TABLET ORAL at 16:14

## 2023-12-02 RX ADMIN — FOLIC ACID 1 MG: 1 TABLET ORAL at 08:51

## 2023-12-02 RX ADMIN — OXYCODONE HYDROCHLORIDE AND ACETAMINOPHEN 1 TABLET: 5; 325 TABLET ORAL at 08:51

## 2023-12-02 RX ADMIN — INSULIN LISPRO 2 UNITS: 100 INJECTION, SOLUTION INTRAVENOUS; SUBCUTANEOUS at 13:09

## 2023-12-02 RX ADMIN — HYDROXYZINE PAMOATE 25 MG: 25 CAPSULE ORAL at 16:14

## 2023-12-02 RX ADMIN — Medication 5 DROP: at 09:39

## 2023-12-02 RX ADMIN — BUDESONIDE 0.5 MG: 0.5 INHALANT ORAL at 07:05

## 2023-12-02 RX ADMIN — LORATADINE 5 MG: 10 TABLET ORAL at 08:51

## 2023-12-02 RX ADMIN — CASTOR OIL AND BALSAM, PERU: 788; 87 OINTMENT TOPICAL at 09:39

## 2023-12-02 RX ADMIN — PIPERACILLIN SODIUM AND TAZOBACTAM SODIUM 3.38 G: 3; .375 INJECTION, SOLUTION INTRAVENOUS at 00:00

## 2023-12-02 RX ADMIN — HYDROXYZINE PAMOATE 25 MG: 25 CAPSULE ORAL at 04:21

## 2023-12-02 RX ADMIN — SERTRALINE HYDROCHLORIDE 50 MG: 50 TABLET ORAL at 08:51

## 2023-12-02 RX ADMIN — LEVETIRACETAM 750 MG: 500 TABLET, FILM COATED ORAL at 20:32

## 2023-12-02 RX ADMIN — ESOMEPRAZOLE MAGNESIUM 40 MG: 40 FOR SUSPENSION ORAL at 08:51

## 2023-12-02 RX ADMIN — INSULIN LISPRO 2 UNITS: 100 INJECTION, SOLUTION INTRAVENOUS; SUBCUTANEOUS at 17:36

## 2023-12-02 RX ADMIN — HYDROCORTISONE: 10 LOTION TOPICAL at 09:39

## 2023-12-02 RX ADMIN — OXYCODONE HYDROCHLORIDE AND ACETAMINOPHEN 1 TABLET: 5; 325 TABLET ORAL at 20:31

## 2023-12-02 RX ADMIN — LACOSAMIDE 100 MG: 100 TABLET, FILM COATED ORAL at 08:50

## 2023-12-02 RX ADMIN — INSULIN LISPRO 1 UNITS: 100 INJECTION, SOLUTION INTRAVENOUS; SUBCUTANEOUS at 08:57

## 2023-12-02 RX ADMIN — LEVETIRACETAM 750 MG: 500 TABLET, FILM COATED ORAL at 08:51

## 2023-12-02 RX ADMIN — HYDROCORTISONE: 10 LOTION TOPICAL at 20:32

## 2023-12-02 RX ADMIN — Medication 1 TABLET: at 20:32

## 2023-12-02 RX ADMIN — PIPERACILLIN SODIUM AND TAZOBACTAM SODIUM 3.38 G: 3; .375 INJECTION, SOLUTION INTRAVENOUS at 12:11

## 2023-12-02 RX ADMIN — Medication 5 DROP: at 20:32

## 2023-12-02 RX ADMIN — DOCUSATE SODIUM 100 MG: 100 CAPSULE, LIQUID FILLED ORAL at 08:50

## 2023-12-02 RX ADMIN — CASTOR OIL AND BALSAM, PERU: 788; 87 OINTMENT TOPICAL at 20:33

## 2023-12-02 RX ADMIN — METOPROLOL SUCCINATE 50 MG: 50 TABLET, EXTENDED RELEASE ORAL at 08:51

## 2023-12-02 RX ADMIN — PIPERACILLIN SODIUM AND TAZOBACTAM SODIUM 3.38 G: 3; .375 INJECTION, SOLUTION INTRAVENOUS at 05:57

## 2023-12-02 RX ADMIN — Medication 100 MG: at 10:21

## 2023-12-02 RX ADMIN — LACOSAMIDE 100 MG: 100 TABLET, FILM COATED ORAL at 20:32

## 2023-12-02 ASSESSMENT — COGNITIVE AND FUNCTIONAL STATUS - GENERAL
MOBILITY SCORE: 8
DRESSING REGULAR LOWER BODY CLOTHING: A LOT
HELP NEEDED FOR BATHING: A LOT
DRESSING REGULAR UPPER BODY CLOTHING: A LOT
STANDING UP FROM CHAIR USING ARMS: TOTAL
MOVING TO AND FROM BED TO CHAIR: TOTAL
WALKING IN HOSPITAL ROOM: TOTAL
TURNING FROM BACK TO SIDE WHILE IN FLAT BAD: A LOT
DAILY ACTIVITIY SCORE: 14
MOVING FROM LYING ON BACK TO SITTING ON SIDE OF FLAT BED WITH BEDRAILS: A LOT
PERSONAL GROOMING: A LOT
CLIMB 3 TO 5 STEPS WITH RAILING: TOTAL
TOILETING: A LOT

## 2023-12-02 ASSESSMENT — PAIN SCALES - GENERAL
PAINLEVEL_OUTOF10: 3
PAINLEVEL_OUTOF10: 0 - NO PAIN

## 2023-12-02 ASSESSMENT — PAIN - FUNCTIONAL ASSESSMENT: PAIN_FUNCTIONAL_ASSESSMENT: 0-10

## 2023-12-02 NOTE — PROGRESS NOTES
Dionte Sharpe is a 53 y.o. male on day 7 of admission presenting with Kidney stone on left side.      Subjective   Seen today blood cultures show no growth for 1 day going to wait for the second day will get no growth for 2 days I will go ahead and order the PICC line to be placed and then give him 14 days of IV Zosyn so far no fever blood pressure and pulse look good and his saturations are adequate       Objective     Last Recorded Vitals  /75   Pulse 75   Temp 35.2 °C (95.4 °F) (Temporal)   Resp 20   Wt 99.8 kg (220 lb)   SpO2 96%   Intake/Output last 3 Shifts:    Intake/Output Summary (Last 24 hours) at 12/2/2023 1355  Last data filed at 12/2/2023 0630  Gross per 24 hour   Intake 1003.75 ml   Output 2295 ml   Net -1291.25 ml       Admission Weight  Weight: 99.8 kg (220 lb) (11/24/23 1741)    Daily Weight  11/24/23 : 99.8 kg (220 lb)    Image Results  US renal complete  Narrative: Interpreted By:  Soy Diaz,   STUDY:  US RENAL COMPLETE;  11/27/2023 4:22 pm      INDICATION:  Signs/Symptoms:renal failure.      COMPARISON:  None.      ACCESSION NUMBER(S):  [id number]      ORDERING CLINICIAN:  JEREMIAH CALVERT      TECHNIQUE:  Multiple images of the kidneys were obtained  , with color Doppler  for blood flow.      FINDINGS:  RIGHT KIDNEY:  The right kidney measures 13.3 cm in length.  The renal cortex is  within normal limits.   There are multiple echogenicities indicating  nephrolithiasis. The largest measures approximately 1 cm at the  midpole. No hydronephrosis. Color Doppler demonstrates renal blood  flow.      LEFT KIDNEY:  The left kidney measures 14 cm in length.  The renal cortex is within  normal limits.  There also multiple intrarenal calculi, the largest  measuring approximately 1 cm at the lower pole. No hydronephrosis.  Color Doppler demonstrates renal blood flow.      BLADDER:  An intraluminal echogenicity with shadowing measuring 2.4 cm would  indicate a calculus. Mural thickening  measuring 7 mm is probably due  to underdistention, but cystitis is possible.      OTHER FINDINGS:  None significant.      Impression: Bilateral nonobstructing nephrolithiasis.  Urinary bladder calculus. Nonspecific mural thickening of the urinary  bladder, but most likely from underdistention.      Signed by: Soy Diaz 11/27/2023 5:28 PM  Dictation workstation:   [id number]  ROS no fever no nausea blood pressure and pulse been good    Physical Exam  Lungs clear heart regular awake alert oriented  Relevant Results               Assessment/Plan   This patient currently has cardiac telemetry ordered; if you would like to modify or discontinue the telemetry order, click here to go to the orders activity to modify/discontinue the order.              Principal Problem:    Kidney stone on left side    So far need to get the negative blood cultures placed PICC line and then I can go ahead and discharge him back to the nursing care facility for treatment thank you              Eddie Rodriguez, DO

## 2023-12-02 NOTE — PROGRESS NOTES
Subjective   Patient is alert with no complaints  24-hour urine for metabolic stone analysis is still pending.  Labs pending today      Blood pressure stable          Objective      General appearance; alert  Neck no JVD no bruit no thyromegaly  Lungs are clear  Heart is rhythmic no gallop no rubs or thrills  Abdomen; active peristalsis no rebound or guarding  ; no CVA tenderness  Extremities; edema 2+  Neurological; pathological reflexes are absent    Vitals 24HR  Heart Rate:  [75-92]   Temp:  [35.2 °C (95.4 °F)-36.8 °C (98.2 °F)]   Resp:  [20]   BP: (126-168)/(68-81)   SpO2:  [87 %-98 %]         Intake/Output last 3 Shifts:    Intake/Output Summary (Last 24 hours) at 12/2/2023 1401  Last data filed at 12/2/2023 0630  Gross per 24 hour   Intake 1003.75 ml   Output 2295 ml   Net -1291.25 ml       Results for orders placed or performed during the hospital encounter of 11/24/23 (from the past 24 hour(s))   POCT GLUCOSE   Result Value Ref Range    POCT Glucose 198 (H) 74 - 99 mg/dL   POCT GLUCOSE   Result Value Ref Range    POCT Glucose 226 (H) 74 - 99 mg/dL   POCT GLUCOSE   Result Value Ref Range    POCT Glucose 186 (H) 74 - 99 mg/dL   POCT GLUCOSE   Result Value Ref Range    POCT Glucose 229 (H) 74 - 99 mg/dL   CBC   Result Value Ref Range    WBC 7.3 4.4 - 11.3 x10*3/uL    nRBC 0.0 0.0 - 0.0 /100 WBCs    RBC 3.89 (L) 4.50 - 5.90 x10*6/uL    Hemoglobin 8.5 (L) 13.5 - 17.5 g/dL    Hematocrit 33.2 (L) 41.0 - 52.0 %    MCV 85 80 - 100 fL    MCH 21.9 (L) 26.0 - 34.0 pg    MCHC 25.6 (L) 32.0 - 36.0 g/dL    RDW 19.5 (H) 11.5 - 14.5 %    Platelets 193 150 - 450 x10*3/uL       Assessment/Plan     Acute kidney injury  With improving renal chemistries  Nephrolithiasis bilaterally  Urinary sepsis  Iron deficiency anemia  Bilateral nephrolithiasis  Hypertension  Proteinuria  Plan; continue current therapy  Awaiting results of metabolic stone survey  Chest x-ray/labs tomorrow     Thank You Very Much for allowing me to  participate in this patient care    Efe Orellana MD

## 2023-12-03 LAB
ALBUMIN SERPL BCP-MCNC: 2.9 G/DL (ref 3.4–5)
ALP SERPL-CCNC: 73 U/L (ref 33–120)
ALT SERPL W P-5'-P-CCNC: 20 U/L (ref 10–52)
ANION GAP SERPL CALC-SCNC: <7 MMOL/L (ref 10–20)
AST SERPL W P-5'-P-CCNC: 15 U/L (ref 9–39)
BILIRUB SERPL-MCNC: 0.2 MG/DL (ref 0–1.2)
BUN SERPL-MCNC: 15 MG/DL (ref 6–23)
CALCIUM SERPL-MCNC: 8.6 MG/DL (ref 8.6–10.3)
CHLORIDE SERPL-SCNC: 105 MMOL/L (ref 98–107)
CO2 SERPL-SCNC: 35 MMOL/L (ref 21–32)
COLLECT DURATION TIME SPEC: 24 HR
CREAT 24H UR-MRATE: 490 MG/D (ref 800–2100)
CREAT SERPL-MCNC: 1.06 MG/DL (ref 0.5–1.3)
CREAT UR-MCNC: 35 MG/DL
ERYTHROCYTE [DISTWIDTH] IN BLOOD BY AUTOMATED COUNT: 18.9 % (ref 11.5–14.5)
GFR SERPL CREATININE-BSD FRML MDRD: 84 ML/MIN/1.73M*2
GLUCOSE BLD MANUAL STRIP-MCNC: 167 MG/DL (ref 74–99)
GLUCOSE BLD MANUAL STRIP-MCNC: 183 MG/DL (ref 74–99)
GLUCOSE BLD MANUAL STRIP-MCNC: 238 MG/DL (ref 74–99)
GLUCOSE SERPL-MCNC: 236 MG/DL (ref 74–99)
HCT VFR BLD AUTO: 37 % (ref 41–52)
HGB BLD-MCNC: 9.8 G/DL (ref 13.5–17.5)
MCH RBC QN AUTO: 21.6 PG (ref 26–34)
MCHC RBC AUTO-ENTMCNC: 26.5 G/DL (ref 32–36)
MCV RBC AUTO: 82 FL (ref 80–100)
NRBC BLD-RTO: 0 /100 WBCS (ref 0–0)
OXALATE 24H UR-MRATE: 13 MG/D (ref 16–49)
OXALATE UR-MCNC: 9 MG/L
PLATELET # BLD AUTO: 234 X10*3/UL (ref 150–450)
POTASSIUM SERPL-SCNC: 4.4 MMOL/L (ref 3.5–5.3)
PROT SERPL-MCNC: 5.5 G/DL (ref 6.4–8.2)
RBC # BLD AUTO: 4.54 X10*6/UL (ref 4.5–5.9)
SODIUM SERPL-SCNC: 141 MMOL/L (ref 136–145)
SPECIMEN VOL ?TM UR: 1400 ML
WBC # BLD AUTO: 8.4 X10*3/UL (ref 4.4–11.3)

## 2023-12-03 PROCEDURE — 84075 ASSAY ALKALINE PHOSPHATASE: CPT | Performed by: INTERNAL MEDICINE

## 2023-12-03 PROCEDURE — 2500000004 HC RX 250 GENERAL PHARMACY W/ HCPCS (ALT 636 FOR OP/ED): Performed by: INTERNAL MEDICINE

## 2023-12-03 PROCEDURE — 87081 CULTURE SCREEN ONLY: CPT | Mod: PARLAB | Performed by: INTERNAL MEDICINE

## 2023-12-03 PROCEDURE — 94640 AIRWAY INHALATION TREATMENT: CPT

## 2023-12-03 PROCEDURE — 85027 COMPLETE CBC AUTOMATED: CPT | Performed by: INTERNAL MEDICINE

## 2023-12-03 PROCEDURE — 2500000001 HC RX 250 WO HCPCS SELF ADMINISTERED DRUGS (ALT 637 FOR MEDICARE OP): Performed by: INTERNAL MEDICINE

## 2023-12-03 PROCEDURE — 36415 COLL VENOUS BLD VENIPUNCTURE: CPT | Performed by: INTERNAL MEDICINE

## 2023-12-03 PROCEDURE — 82947 ASSAY GLUCOSE BLOOD QUANT: CPT

## 2023-12-03 PROCEDURE — 2500000004 HC RX 250 GENERAL PHARMACY W/ HCPCS (ALT 636 FOR OP/ED): Performed by: NURSE PRACTITIONER

## 2023-12-03 PROCEDURE — 2500000002 HC RX 250 W HCPCS SELF ADMINISTERED DRUGS (ALT 637 FOR MEDICARE OP, ALT 636 FOR OP/ED): Performed by: NURSE PRACTITIONER

## 2023-12-03 PROCEDURE — 2500000005 HC RX 250 GENERAL PHARMACY W/O HCPCS: Performed by: INTERNAL MEDICINE

## 2023-12-03 PROCEDURE — 2500000001 HC RX 250 WO HCPCS SELF ADMINISTERED DRUGS (ALT 637 FOR MEDICARE OP): Performed by: NURSE PRACTITIONER

## 2023-12-03 PROCEDURE — 2060000001 HC INTERMEDIATE ICU ROOM DAILY

## 2023-12-03 RX ORDER — LIDOCAINE HYDROCHLORIDE 10 MG/ML
5 INJECTION INFILTRATION; PERINEURAL ONCE
Status: DISCONTINUED | OUTPATIENT
Start: 2023-12-03 | End: 2023-12-06 | Stop reason: HOSPADM

## 2023-12-03 RX ORDER — IPRATROPIUM BROMIDE AND ALBUTEROL SULFATE 2.5; .5 MG/3ML; MG/3ML
3 SOLUTION RESPIRATORY (INHALATION) EVERY 2 HOUR PRN
Status: DISCONTINUED | OUTPATIENT
Start: 2023-12-03 | End: 2023-12-06 | Stop reason: HOSPADM

## 2023-12-03 RX ORDER — IPRATROPIUM BROMIDE AND ALBUTEROL SULFATE 2.5; .5 MG/3ML; MG/3ML
3 SOLUTION RESPIRATORY (INHALATION)
Status: DISCONTINUED | OUTPATIENT
Start: 2023-12-03 | End: 2023-12-03

## 2023-12-03 RX ORDER — SODIUM CITRATE AND CITRIC ACID MONOHYDRATE 334; 500 MG/5ML; MG/5ML
30 SOLUTION ORAL 2 TIMES DAILY
Status: DISCONTINUED | OUTPATIENT
Start: 2023-12-03 | End: 2023-12-06 | Stop reason: HOSPADM

## 2023-12-03 RX ORDER — PIPERACILLIN SODIUM, TAZOBACTAM SODIUM 3; .375 G/15ML; G/15ML
3.38 INJECTION, POWDER, LYOPHILIZED, FOR SOLUTION INTRAVENOUS EVERY 6 HOURS
Qty: 189 G | Refills: 0 | Status: SHIPPED | OUTPATIENT
Start: 2023-12-03 | End: 2023-12-17

## 2023-12-03 RX ORDER — DOCUSATE SODIUM 100 MG/1
100 CAPSULE, LIQUID FILLED ORAL 2 TIMES DAILY
Start: 2023-12-03

## 2023-12-03 RX ORDER — ESOMEPRAZOLE MAGNESIUM 40 MG/1
40 GRANULE, DELAYED RELEASE ORAL
Start: 2023-12-04

## 2023-12-03 RX ADMIN — METOPROLOL SUCCINATE 50 MG: 50 TABLET, EXTENDED RELEASE ORAL at 10:04

## 2023-12-03 RX ADMIN — Medication 1 TABLET: at 21:17

## 2023-12-03 RX ADMIN — INSULIN LISPRO 2 UNITS: 100 INJECTION, SOLUTION INTRAVENOUS; SUBCUTANEOUS at 17:32

## 2023-12-03 RX ADMIN — Medication 100 MG: at 09:00

## 2023-12-03 RX ADMIN — SODIUM CITRATE AND CITRIC ACID MONOHYDRATE 30 ML: 500; 334 SOLUTION ORAL at 14:45

## 2023-12-03 RX ADMIN — Medication: at 07:00

## 2023-12-03 RX ADMIN — OXYCODONE HYDROCHLORIDE AND ACETAMINOPHEN 1 TABLET: 5; 325 TABLET ORAL at 21:17

## 2023-12-03 RX ADMIN — HYDROCORTISONE: 10 LOTION TOPICAL at 21:00

## 2023-12-03 RX ADMIN — SODIUM CITRATE AND CITRIC ACID MONOHYDRATE 30 ML: 500; 334 SOLUTION ORAL at 21:18

## 2023-12-03 RX ADMIN — LORATADINE 5 MG: 10 TABLET ORAL at 10:04

## 2023-12-03 RX ADMIN — ESOMEPRAZOLE MAGNESIUM 40 MG: 40 FOR SUSPENSION ORAL at 10:08

## 2023-12-03 RX ADMIN — DOCUSATE SODIUM 100 MG: 100 CAPSULE, LIQUID FILLED ORAL at 10:08

## 2023-12-03 RX ADMIN — CASTOR OIL AND BALSAM, PERU: 788; 87 OINTMENT TOPICAL at 10:04

## 2023-12-03 RX ADMIN — Medication 1 TABLET: at 10:04

## 2023-12-03 RX ADMIN — Medication 5 DROP: at 21:17

## 2023-12-03 RX ADMIN — SERTRALINE HYDROCHLORIDE 50 MG: 50 TABLET ORAL at 10:03

## 2023-12-03 RX ADMIN — CASTOR OIL AND BALSAM, PERU: 788; 87 OINTMENT TOPICAL at 21:00

## 2023-12-03 RX ADMIN — LEVETIRACETAM 750 MG: 500 TABLET, FILM COATED ORAL at 21:17

## 2023-12-03 RX ADMIN — BUDESONIDE 0.5 MG: 0.5 INHALANT ORAL at 06:57

## 2023-12-03 RX ADMIN — FOLIC ACID 1 MG: 1 TABLET ORAL at 10:08

## 2023-12-03 RX ADMIN — HYDROCORTISONE: 10 LOTION TOPICAL at 09:00

## 2023-12-03 RX ADMIN — PIPERACILLIN SODIUM AND TAZOBACTAM SODIUM 3.38 G: 3; .375 INJECTION, SOLUTION INTRAVENOUS at 14:44

## 2023-12-03 RX ADMIN — BUDESONIDE 0.5 MG: 0.5 INHALANT ORAL at 18:46

## 2023-12-03 RX ADMIN — PIPERACILLIN SODIUM AND TAZOBACTAM SODIUM 3.38 G: 3; .375 INJECTION, SOLUTION INTRAVENOUS at 17:32

## 2023-12-03 RX ADMIN — PIPERACILLIN SODIUM AND TAZOBACTAM SODIUM 3.38 G: 3; .375 INJECTION, SOLUTION INTRAVENOUS at 05:56

## 2023-12-03 RX ADMIN — INSULIN LISPRO 4 UNITS: 100 INJECTION, SOLUTION INTRAVENOUS; SUBCUTANEOUS at 12:39

## 2023-12-03 RX ADMIN — LEVETIRACETAM 750 MG: 500 TABLET, FILM COATED ORAL at 10:03

## 2023-12-03 RX ADMIN — IRON SUCROSE 200 MG: 20 INJECTION, SOLUTION INTRAVENOUS at 14:44

## 2023-12-03 RX ADMIN — IPRATROPIUM BROMIDE AND ALBUTEROL SULFATE 3 ML: 2.5; .5 SOLUTION RESPIRATORY (INHALATION) at 06:57

## 2023-12-03 RX ADMIN — Medication 5 DROP: at 10:05

## 2023-12-03 RX ADMIN — LACOSAMIDE 100 MG: 100 TABLET, FILM COATED ORAL at 21:17

## 2023-12-03 RX ADMIN — OXYCODONE HYDROCHLORIDE AND ACETAMINOPHEN 1 TABLET: 5; 325 TABLET ORAL at 10:04

## 2023-12-03 RX ADMIN — PIPERACILLIN SODIUM AND TAZOBACTAM SODIUM 3.38 G: 3; .375 INJECTION, SOLUTION INTRAVENOUS at 00:05

## 2023-12-03 RX ADMIN — LACOSAMIDE 100 MG: 100 TABLET, FILM COATED ORAL at 10:03

## 2023-12-03 ASSESSMENT — COGNITIVE AND FUNCTIONAL STATUS - GENERAL
STANDING UP FROM CHAIR USING ARMS: TOTAL
MOVING FROM LYING ON BACK TO SITTING ON SIDE OF FLAT BED WITH BEDRAILS: A LOT
TURNING FROM BACK TO SIDE WHILE IN FLAT BAD: A LOT
MOBILITY SCORE: 8
DAILY ACTIVITIY SCORE: 14
CLIMB 3 TO 5 STEPS WITH RAILING: TOTAL
CLIMB 3 TO 5 STEPS WITH RAILING: TOTAL
PERSONAL GROOMING: A LOT
WALKING IN HOSPITAL ROOM: TOTAL
HELP NEEDED FOR BATHING: A LOT
MOVING TO AND FROM BED TO CHAIR: TOTAL
MOVING TO AND FROM BED TO CHAIR: TOTAL
DRESSING REGULAR LOWER BODY CLOTHING: A LOT
HELP NEEDED FOR BATHING: A LOT
TURNING FROM BACK TO SIDE WHILE IN FLAT BAD: A LOT
DAILY ACTIVITIY SCORE: 14
TOILETING: A LOT
DRESSING REGULAR LOWER BODY CLOTHING: A LOT
MOVING FROM LYING ON BACK TO SITTING ON SIDE OF FLAT BED WITH BEDRAILS: A LOT
TOILETING: A LOT
STANDING UP FROM CHAIR USING ARMS: TOTAL
MOBILITY SCORE: 8
WALKING IN HOSPITAL ROOM: TOTAL
PERSONAL GROOMING: A LOT
DRESSING REGULAR UPPER BODY CLOTHING: A LOT
DRESSING REGULAR UPPER BODY CLOTHING: A LOT

## 2023-12-03 ASSESSMENT — PAIN SCALES - GENERAL
PAINLEVEL_OUTOF10: 0 - NO PAIN
PAINLEVEL_OUTOF10: 5 - MODERATE PAIN

## 2023-12-03 ASSESSMENT — PAIN - FUNCTIONAL ASSESSMENT: PAIN_FUNCTIONAL_ASSESSMENT: 0-10

## 2023-12-03 NOTE — CARE PLAN
The patient's goals for the shift include      The clinical goals for the shift include comfort and rest    Over the shift, the patient will be comfortable and refrain from complications

## 2023-12-03 NOTE — PROGRESS NOTES
Subjective   Patient is alert with no complaints  24-hour urine collection for oxalic acid is low at 13  24-hour urine collection for citrate is low at 49 and ratio to creatinine is low as well at 97  Iron saturation is low at 15%  Blood pressure stable  Renal chemistries continue to improve today creatinine is 1.06  HemoGlobin improving today is 9.8   x-ray showed bibasilar diffuse interstitial infiltrates  Patient is already on Zosyn    Objective      General appearance; alert  Neck no JVD no bruit no thyromegaly  Lungs are clear  Heart is rhythmic no gallop no rubs or thrills  Abdomen; active peristalsis no rebound or guarding  ; no CVA tenderness  Extremities; edema 2+  Neurological; pathological reflexes are absent    Vitals 24HR  Heart Rate:  [67-85]   Temp:  [35.8 °C (96.4 °F)-36.7 °C (98.1 °F)]   Resp:  [16-20]   BP: (110-136)/(68-90)   SpO2:  [92 %-100 %]         Intake/Output last 3 Shifts:    Intake/Output Summary (Last 24 hours) at 12/3/2023 1233  Last data filed at 12/3/2023 1148  Gross per 24 hour   Intake 2485 ml   Output 3445 ml   Net -960 ml       Results for orders placed or performed during the hospital encounter of 11/24/23 (from the past 24 hour(s))   CBC   Result Value Ref Range    WBC 7.3 4.4 - 11.3 x10*3/uL    nRBC 0.0 0.0 - 0.0 /100 WBCs    RBC 3.89 (L) 4.50 - 5.90 x10*6/uL    Hemoglobin 8.5 (L) 13.5 - 17.5 g/dL    Hematocrit 33.2 (L) 41.0 - 52.0 %    MCV 85 80 - 100 fL    MCH 21.9 (L) 26.0 - 34.0 pg    MCHC 25.6 (L) 32.0 - 36.0 g/dL    RDW 19.5 (H) 11.5 - 14.5 %    Platelets 193 150 - 450 x10*3/uL   POCT GLUCOSE   Result Value Ref Range    POCT Glucose 215 (H) 74 - 99 mg/dL   Comprehensive metabolic panel   Result Value Ref Range    Glucose 236 (H) 74 - 99 mg/dL    Sodium 141 136 - 145 mmol/L    Potassium 4.4 3.5 - 5.3 mmol/L    Chloride 105 98 - 107 mmol/L    Bicarbonate 35 (H) 21 - 32 mmol/L    Anion Gap <7 (L) 10 - 20 mmol/L    Urea Nitrogen 15 6 - 23 mg/dL    Creatinine 1.06 0.50  - 1.30 mg/dL    eGFR 84 >60 mL/min/1.73m*2    Calcium 8.6 8.6 - 10.3 mg/dL    Albumin 2.9 (L) 3.4 - 5.0 g/dL    Alkaline Phosphatase 73 33 - 120 U/L    Total Protein 5.5 (L) 6.4 - 8.2 g/dL    AST 15 9 - 39 U/L    Bilirubin, Total 0.2 0.0 - 1.2 mg/dL    ALT 20 10 - 52 U/L   CBC   Result Value Ref Range    WBC 8.4 4.4 - 11.3 x10*3/uL    nRBC 0.0 0.0 - 0.0 /100 WBCs    RBC 4.54 4.50 - 5.90 x10*6/uL    Hemoglobin 9.8 (L) 13.5 - 17.5 g/dL    Hematocrit 37.0 (L) 41.0 - 52.0 %    MCV 82 80 - 100 fL    MCH 21.6 (L) 26.0 - 34.0 pg    MCHC 26.5 (L) 32.0 - 36.0 g/dL    RDW 18.9 (H) 11.5 - 14.5 %    Platelets 234 150 - 450 x10*3/uL   POCT GLUCOSE   Result Value Ref Range    POCT Glucose 183 (H) 74 - 99 mg/dL   POCT GLUCOSE   Result Value Ref Range    POCT Glucose 238 (H) 74 - 99 mg/dL       Assessment/Plan     Acute kidney injury  With improving renal chemistries  Hypocitraturia  Nephrolithiasis bilaterally  Urinary sepsis  Iron deficiency anemia  Bilateral nephrolithiasis  Hypertension  Proteinuria  Plan; continue current therapy  Add DuoNeb aerosols  IV iron  Labs in the morning   Thank You Very Much for allowing me to participate in this patient care    Efe Orellana MD

## 2023-12-03 NOTE — PROGRESS NOTES
Dionte Sharpe is a 53 y.o. male on day 8 of admission presenting with Kidney stone on left side.      Subjective   Seen today renal functions normal needs a PICC line now blood cultures have been negative for 2 days clinical ahead and place a 1 lm PICC and then send him on 14 days of Zosyn       Objective     Last Recorded Vitals  /90   Pulse 73   Temp 36.7 °C (98.1 °F)   Resp 16   Wt 99.8 kg (220 lb)   SpO2 97%   Intake/Output last 3 Shifts:    Intake/Output Summary (Last 24 hours) at 12/3/2023 1313  Last data filed at 12/3/2023 1148  Gross per 24 hour   Intake 2435 ml   Output 3445 ml   Net -1010 ml       Admission Weight  Weight: 99.8 kg (220 lb) (11/24/23 1741)    Daily Weight  11/24/23 : 99.8 kg (220 lb)    Image Results  XR chest 1 view  Narrative: Interpreted By:  Koby Friedman,   STUDY:  XR CHEST 1 VIEW  12/2/2023 2:35 pm      INDICATION:  Signs/Symptoms:Atelectasis      COMPARISON:  06/20/2023      ACCESSION NUMBER(S):  [id number]      ORDERING CLINICIAN:  JEREMIAH CALVERT      TECHNIQUE:  A single AP portable radiograph of the chest was obtained.      FINDINGS:  Multiple cardiac monitoring leads are seen over the chest.  The  tracheostomy tube is unchanged in position. Mild-to-moderate diffuse  interstitial infiltrates are seen bilaterally, and may represent  edema and/or pneumonia. No focal infiltrate, pleural effusion or  pneumothorax is identified. The cardiac silhouette is enlarged,  similar to prior studies.      Impression: Diffuse interstitial infiltrates, as above.      MACRO:  None.      Signed by: Koby Friedman 12/3/2023 11:32 AM  Dictation workstation:   [id number]    Review of systems no fever blood cultures are negative can go ahead with a PICC line  Physical Exam  Awake and alert comfortable no fever  Relevant Results               Assessment/Plan   This patient currently has cardiac telemetry ordered; if you would like to modify or discontinue the telemetry order, click here to  go to the orders activity to modify/discontinue the order.              Principal Problem:    Kidney stone on left side    For now Zosyn over 14-day.  I will go ahead and put the discharge and will need a PICC line placed probably tomorrow and then he can go back to the nursing care facility thank you              Eddie Rodriguez, DO

## 2023-12-03 NOTE — CARE PLAN
The patient's goals for the shift include      The clinical goals for the shift include patient will refrain from shortness of breath and further complications    Over the shift, the patient will refrain from discomfort and complications

## 2023-12-04 LAB
GLUCOSE BLD MANUAL STRIP-MCNC: 163 MG/DL (ref 74–99)
GLUCOSE BLD MANUAL STRIP-MCNC: 216 MG/DL (ref 74–99)
GLUCOSE BLD MANUAL STRIP-MCNC: 331 MG/DL (ref 74–99)

## 2023-12-04 PROCEDURE — 2500000004 HC RX 250 GENERAL PHARMACY W/ HCPCS (ALT 636 FOR OP/ED): Performed by: INTERNAL MEDICINE

## 2023-12-04 PROCEDURE — 2500000001 HC RX 250 WO HCPCS SELF ADMINISTERED DRUGS (ALT 637 FOR MEDICARE OP): Performed by: NURSE PRACTITIONER

## 2023-12-04 PROCEDURE — 2500000001 HC RX 250 WO HCPCS SELF ADMINISTERED DRUGS (ALT 637 FOR MEDICARE OP): Performed by: INTERNAL MEDICINE

## 2023-12-04 PROCEDURE — 2500000002 HC RX 250 W HCPCS SELF ADMINISTERED DRUGS (ALT 637 FOR MEDICARE OP, ALT 636 FOR OP/ED): Performed by: NURSE PRACTITIONER

## 2023-12-04 PROCEDURE — 82947 ASSAY GLUCOSE BLOOD QUANT: CPT

## 2023-12-04 PROCEDURE — 94640 AIRWAY INHALATION TREATMENT: CPT

## 2023-12-04 PROCEDURE — 2500000004 HC RX 250 GENERAL PHARMACY W/ HCPCS (ALT 636 FOR OP/ED): Performed by: NURSE PRACTITIONER

## 2023-12-04 PROCEDURE — 2060000001 HC INTERMEDIATE ICU ROOM DAILY

## 2023-12-04 RX ADMIN — HYDROCORTISONE: 10 LOTION TOPICAL at 21:33

## 2023-12-04 RX ADMIN — LEVETIRACETAM 750 MG: 500 TABLET, FILM COATED ORAL at 21:32

## 2023-12-04 RX ADMIN — PIPERACILLIN SODIUM AND TAZOBACTAM SODIUM 3.38 G: 3; .375 INJECTION, SOLUTION INTRAVENOUS at 17:37

## 2023-12-04 RX ADMIN — HYDROCORTISONE: 10 LOTION TOPICAL at 09:00

## 2023-12-04 RX ADMIN — PIPERACILLIN SODIUM AND TAZOBACTAM SODIUM 3.38 G: 3; .375 INJECTION, SOLUTION INTRAVENOUS at 05:48

## 2023-12-04 RX ADMIN — CASTOR OIL AND BALSAM, PERU: 788; 87 OINTMENT TOPICAL at 21:31

## 2023-12-04 RX ADMIN — Medication 1 TABLET: at 08:46

## 2023-12-04 RX ADMIN — INSULIN LISPRO 2 UNITS: 100 INJECTION, SOLUTION INTRAVENOUS; SUBCUTANEOUS at 13:45

## 2023-12-04 RX ADMIN — BUDESONIDE 0.5 MG: 0.5 INHALANT ORAL at 19:37

## 2023-12-04 RX ADMIN — INSULIN LISPRO 1 UNITS: 100 INJECTION, SOLUTION INTRAVENOUS; SUBCUTANEOUS at 18:09

## 2023-12-04 RX ADMIN — PIPERACILLIN SODIUM AND TAZOBACTAM SODIUM 3.38 G: 3; .375 INJECTION, SOLUTION INTRAVENOUS at 13:54

## 2023-12-04 RX ADMIN — METOPROLOL SUCCINATE 50 MG: 50 TABLET, EXTENDED RELEASE ORAL at 08:45

## 2023-12-04 RX ADMIN — OXYCODONE HYDROCHLORIDE AND ACETAMINOPHEN 1 TABLET: 5; 325 TABLET ORAL at 08:46

## 2023-12-04 RX ADMIN — SODIUM CITRATE AND CITRIC ACID MONOHYDRATE 30 ML: 500; 334 SOLUTION ORAL at 21:32

## 2023-12-04 RX ADMIN — OXYCODONE HYDROCHLORIDE AND ACETAMINOPHEN 1 TABLET: 5; 325 TABLET ORAL at 21:32

## 2023-12-04 RX ADMIN — Medication 100 MG: at 08:57

## 2023-12-04 RX ADMIN — SODIUM CITRATE AND CITRIC ACID MONOHYDRATE 30 ML: 500; 334 SOLUTION ORAL at 08:59

## 2023-12-04 RX ADMIN — SODIUM CHLORIDE, SODIUM LACTATE, POTASSIUM CHLORIDE, CALCIUM CHLORIDE AND DEXTROSE MONOHYDRATE 75 ML/HR: 5; 600; 310; 30; 20 INJECTION, SOLUTION INTRAVENOUS at 17:37

## 2023-12-04 RX ADMIN — DOCUSATE SODIUM 100 MG: 100 CAPSULE, LIQUID FILLED ORAL at 08:47

## 2023-12-04 RX ADMIN — LACOSAMIDE 100 MG: 100 TABLET, FILM COATED ORAL at 08:47

## 2023-12-04 RX ADMIN — LORATADINE 5 MG: 10 TABLET ORAL at 08:46

## 2023-12-04 RX ADMIN — PIPERACILLIN SODIUM AND TAZOBACTAM SODIUM 3.38 G: 3; .375 INJECTION, SOLUTION INTRAVENOUS at 00:14

## 2023-12-04 RX ADMIN — LEVETIRACETAM 750 MG: 500 TABLET, FILM COATED ORAL at 08:45

## 2023-12-04 RX ADMIN — PIPERACILLIN SODIUM AND TAZOBACTAM SODIUM 3.38 G: 3; .375 INJECTION, SOLUTION INTRAVENOUS at 23:17

## 2023-12-04 RX ADMIN — FOLIC ACID 1 MG: 1 TABLET ORAL at 08:47

## 2023-12-04 RX ADMIN — DOCUSATE SODIUM 100 MG: 100 CAPSULE, LIQUID FILLED ORAL at 21:32

## 2023-12-04 RX ADMIN — Medication 5 DROP: at 21:32

## 2023-12-04 RX ADMIN — SERTRALINE HYDROCHLORIDE 50 MG: 50 TABLET ORAL at 08:45

## 2023-12-04 RX ADMIN — CASTOR OIL AND BALSAM, PERU: 788; 87 OINTMENT TOPICAL at 09:00

## 2023-12-04 RX ADMIN — SODIUM CHLORIDE, SODIUM LACTATE, POTASSIUM CHLORIDE, CALCIUM CHLORIDE AND DEXTROSE MONOHYDRATE 75 ML/HR: 5; 600; 310; 30; 20 INJECTION, SOLUTION INTRAVENOUS at 03:20

## 2023-12-04 RX ADMIN — Medication 1 TABLET: at 21:32

## 2023-12-04 RX ADMIN — BUDESONIDE 0.5 MG: 0.5 INHALANT ORAL at 06:54

## 2023-12-04 RX ADMIN — INSULIN LISPRO 1 UNITS: 100 INJECTION, SOLUTION INTRAVENOUS; SUBCUTANEOUS at 09:04

## 2023-12-04 RX ADMIN — ESOMEPRAZOLE MAGNESIUM 40 MG: 40 FOR SUSPENSION ORAL at 08:47

## 2023-12-04 RX ADMIN — LACOSAMIDE 100 MG: 100 TABLET, FILM COATED ORAL at 21:32

## 2023-12-04 ASSESSMENT — COGNITIVE AND FUNCTIONAL STATUS - GENERAL
WALKING IN HOSPITAL ROOM: TOTAL
HELP NEEDED FOR BATHING: A LOT
DAILY ACTIVITIY SCORE: 13
TOILETING: A LOT
MOBILITY SCORE: 7
STANDING UP FROM CHAIR USING ARMS: TOTAL
TURNING FROM BACK TO SIDE WHILE IN FLAT BAD: TOTAL
CLIMB 3 TO 5 STEPS WITH RAILING: TOTAL
MOVING TO AND FROM BED TO CHAIR: TOTAL
PERSONAL GROOMING: A LOT
MOVING FROM LYING ON BACK TO SITTING ON SIDE OF FLAT BED WITH BEDRAILS: A LOT
DRESSING REGULAR UPPER BODY CLOTHING: A LOT
DRESSING REGULAR LOWER BODY CLOTHING: A LOT
EATING MEALS: A LITTLE

## 2023-12-04 ASSESSMENT — PAIN - FUNCTIONAL ASSESSMENT: PAIN_FUNCTIONAL_ASSESSMENT: 0-10

## 2023-12-04 ASSESSMENT — PAIN SCALES - GENERAL
PAINLEVEL_OUTOF10: 0 - NO PAIN
PAINLEVEL_OUTOF10: 0 - NO PAIN

## 2023-12-04 NOTE — PROGRESS NOTES
Spiritual Care Visit    Clinical Encounter Type  Visited With: Patient  Routine Visit: Follow-up  Continue Visiting: Yes    Evangelical Encounters  Evangelical Needs: Spiritual care brochure, Prayer       welcomed by patient.  Patient was in fragile condition, embraced spiritual care as evidenced by making sign of cross and expressing thanks for prayer and blessing.  Additional visits advised.

## 2023-12-04 NOTE — PROGRESS NOTES
Dionte Sharpe is a 53 y.o. male on day 9 of admission presenting with Kidney stone on left side.      Subjective   Seen today trying to set him up to go back to the [facility]       Objective     Last Recorded Vitals  /80   Pulse 85   Temp 36.1 °C (97 °F) (Temporal)   Resp 20   Wt 99.8 kg (220 lb)   SpO2 97%   Intake/Output last 3 Shifts:    Intake/Output Summary (Last 24 hours) at 12/4/2023 1513  Last data filed at 12/4/2023 0618  Gross per 24 hour   Intake 100 ml   Output 1695 ml   Net -1595 ml       Admission Weight  Weight: 99.8 kg (220 lb) (11/24/23 1741)    Daily Weight  11/24/23 : 99.8 kg (220 lb)    Image Results  XR chest 1 view  Narrative: Interpreted By:  Koby Friedman,   STUDY:  XR CHEST 1 VIEW  12/2/2023 2:35 pm      INDICATION:  Signs/Symptoms:Atelectasis      COMPARISON:  06/20/2023      ACCESSION NUMBER(S):  [id number]      ORDERING CLINICIAN:  JEREMIAH CALVERT      TECHNIQUE:  A single AP portable radiograph of the chest was obtained.      FINDINGS:  Multiple cardiac monitoring leads are seen over the chest.  The  tracheostomy tube is unchanged in position. Mild-to-moderate diffuse  interstitial infiltrates are seen bilaterally, and may represent  edema and/or pneumonia. No focal infiltrate, pleural effusion or  pneumothorax is identified. The cardiac silhouette is enlarged,  similar to prior studies.      Impression: Diffuse interstitial infiltrates, as above.      MACRO:  None.      Signed by: Koby Friedman 12/3/2023 11:32 AM  Dictation workstation:   [id number]  Review of systems no fever he is stable PICC line has to go and then 14 days of IV therapy    Physical Exam  Awake and alert comfortable  Relevant Results               Assessment/Plan   This patient currently has cardiac telemetry ordered; if you would like to modify or discontinue the telemetry order, click here to go to the orders activity to modify/discontinue the order.              Principal Problem:    Kidney stone on left  side    Sepsis bacteremia treated for an additional 14 days IV therapy get the PICC line in him continue all present care and therapy and treatment plan.              Eddie Rodriguez DO

## 2023-12-04 NOTE — PROGRESS NOTES
Subjective   Patient is alert with no complaints      Objective      General appearance; alert  Neck no JVD no bruit no thyromegaly  Lungs are clear  Heart is rhythmic no gallop no rubs or thrills  Abdomen; active peristalsis no rebound or guarding  ; no CVA tenderness  Extremities; edema 2+  Neurological; pathological reflexes are absent    Vitals 24HR  Heart Rate:  [80-90]   Temp:  [36.1 °C (97 °F)-36.4 °C (97.5 °F)]   Resp:  [18-20]   BP: (124-158)/(71-86)   SpO2:  [93 %-97 %]         Intake/Output last 3 Shifts:    Intake/Output Summary (Last 24 hours) at 12/4/2023 1352  Last data filed at 12/4/2023 0618  Gross per 24 hour   Intake 100 ml   Output 2055 ml   Net -1955 ml       Results for orders placed or performed during the hospital encounter of 11/24/23 (from the past 24 hour(s))   POCT GLUCOSE   Result Value Ref Range    POCT Glucose 167 (H) 74 - 99 mg/dL   POCT GLUCOSE   Result Value Ref Range    POCT Glucose 216 (H) 74 - 99 mg/dL       Assessment/Plan     Acute kidney injury  With improving renal chemistries  Hypocitraturia  Nephrolithiasis bilaterally  Urinary sepsis  Iron deficiency anemia  Bilateral nephrolithiasis  Hypertension  Proteinuria  Plan; continue current therapy  Add DuoNeb aerosols  IV iron  Labs tomorrow   Thank You Very Much for allowing me to participate in this patient care    Efe Orellana MD

## 2023-12-04 NOTE — PROGRESS NOTES
Reviewed chart. Per provider documentation patient will need 14 days of IV Zosyn and a PICC line. [facility] notified in careport. Updates sent to [facility] in careport. Patient will need a [employer] carito and 7000.  12:55p Request sent to the [facility] to have goldenrod sent and completion of 7000.    Alexandra Baeza RN

## 2023-12-04 NOTE — CARE PLAN
The patient's goals for the shift include  comfort and rest    The clinical goals for the shift include PICC/Midline placement

## 2023-12-05 LAB
ALBUMIN SERPL BCP-MCNC: 3 G/DL (ref 3.4–5)
ALP SERPL-CCNC: 66 U/L (ref 33–120)
ALT SERPL W P-5'-P-CCNC: 14 U/L (ref 10–52)
ANION GAP SERPL CALC-SCNC: 8 MMOL/L (ref 10–20)
AST SERPL W P-5'-P-CCNC: 13 U/L (ref 9–39)
BACTERIA BLD CULT: NORMAL
BACTERIA BLD CULT: NORMAL
BILIRUB SERPL-MCNC: 0.2 MG/DL (ref 0–1.2)
BUN SERPL-MCNC: 9 MG/DL (ref 6–23)
CALCIUM SERPL-MCNC: 8.5 MG/DL (ref 8.6–10.3)
CHLORIDE SERPL-SCNC: 102 MMOL/L (ref 98–107)
CO2 SERPL-SCNC: 36 MMOL/L (ref 21–32)
CREAT SERPL-MCNC: 0.93 MG/DL (ref 0.5–1.3)
ERYTHROCYTE [DISTWIDTH] IN BLOOD BY AUTOMATED COUNT: 19.3 % (ref 11.5–14.5)
GFR SERPL CREATININE-BSD FRML MDRD: >90 ML/MIN/1.73M*2
GLUCOSE BLD MANUAL STRIP-MCNC: 152 MG/DL (ref 74–99)
GLUCOSE BLD MANUAL STRIP-MCNC: 158 MG/DL (ref 74–99)
GLUCOSE BLD MANUAL STRIP-MCNC: 175 MG/DL (ref 74–99)
GLUCOSE BLD MANUAL STRIP-MCNC: 198 MG/DL (ref 74–99)
GLUCOSE BLD MANUAL STRIP-MCNC: 207 MG/DL (ref 74–99)
GLUCOSE SERPL-MCNC: 165 MG/DL (ref 74–99)
HCT VFR BLD AUTO: 36.2 % (ref 41–52)
HGB BLD-MCNC: 9.6 G/DL (ref 13.5–17.5)
MCH RBC QN AUTO: 21.5 PG (ref 26–34)
MCHC RBC AUTO-ENTMCNC: 26.5 G/DL (ref 32–36)
MCV RBC AUTO: 81 FL (ref 80–100)
NRBC BLD-RTO: 0 /100 WBCS (ref 0–0)
PLATELET # BLD AUTO: 257 X10*3/UL (ref 150–450)
POTASSIUM SERPL-SCNC: 3.8 MMOL/L (ref 3.5–5.3)
PROT SERPL-MCNC: 6 G/DL (ref 6.4–8.2)
RBC # BLD AUTO: 4.47 X10*6/UL (ref 4.5–5.9)
SODIUM SERPL-SCNC: 142 MMOL/L (ref 136–145)
WBC # BLD AUTO: 7.9 X10*3/UL (ref 4.4–11.3)

## 2023-12-05 PROCEDURE — 36415 COLL VENOUS BLD VENIPUNCTURE: CPT | Performed by: INTERNAL MEDICINE

## 2023-12-05 PROCEDURE — 94640 AIRWAY INHALATION TREATMENT: CPT

## 2023-12-05 PROCEDURE — 36410 VNPNXR 3YR/> PHY/QHP DX/THER: CPT

## 2023-12-05 PROCEDURE — 85027 COMPLETE CBC AUTOMATED: CPT | Performed by: INTERNAL MEDICINE

## 2023-12-05 PROCEDURE — 82947 ASSAY GLUCOSE BLOOD QUANT: CPT

## 2023-12-05 PROCEDURE — 2500000004 HC RX 250 GENERAL PHARMACY W/ HCPCS (ALT 636 FOR OP/ED): Performed by: INTERNAL MEDICINE

## 2023-12-05 PROCEDURE — 2780000003 HC OR 278 NO HCPCS

## 2023-12-05 PROCEDURE — 2500000002 HC RX 250 W HCPCS SELF ADMINISTERED DRUGS (ALT 637 FOR MEDICARE OP, ALT 636 FOR OP/ED): Performed by: NURSE PRACTITIONER

## 2023-12-05 PROCEDURE — 80053 COMPREHEN METABOLIC PANEL: CPT | Performed by: INTERNAL MEDICINE

## 2023-12-05 PROCEDURE — 2500000002 HC RX 250 W HCPCS SELF ADMINISTERED DRUGS (ALT 637 FOR MEDICARE OP, ALT 636 FOR OP/ED): Performed by: INTERNAL MEDICINE

## 2023-12-05 PROCEDURE — 2500000001 HC RX 250 WO HCPCS SELF ADMINISTERED DRUGS (ALT 637 FOR MEDICARE OP): Performed by: NURSE PRACTITIONER

## 2023-12-05 PROCEDURE — 2500000001 HC RX 250 WO HCPCS SELF ADMINISTERED DRUGS (ALT 637 FOR MEDICARE OP): Performed by: INTERNAL MEDICINE

## 2023-12-05 PROCEDURE — C1751 CATH, INF, PER/CENT/MIDLINE: HCPCS

## 2023-12-05 PROCEDURE — 2500000004 HC RX 250 GENERAL PHARMACY W/ HCPCS (ALT 636 FOR OP/ED): Performed by: NURSE PRACTITIONER

## 2023-12-05 PROCEDURE — 2060000001 HC INTERMEDIATE ICU ROOM DAILY

## 2023-12-05 RX ORDER — METOPROLOL SUCCINATE 50 MG/1
100 TABLET, EXTENDED RELEASE ORAL DAILY
Status: DISCONTINUED | OUTPATIENT
Start: 2023-12-06 | End: 2023-12-06 | Stop reason: HOSPADM

## 2023-12-05 RX ORDER — LIDOCAINE HYDROCHLORIDE 10 MG/ML
5 INJECTION INFILTRATION; PERINEURAL ONCE
Status: DISCONTINUED | OUTPATIENT
Start: 2023-12-05 | End: 2023-12-06 | Stop reason: HOSPADM

## 2023-12-05 RX ADMIN — PIPERACILLIN SODIUM AND TAZOBACTAM SODIUM 3.38 G: 3; .375 INJECTION, SOLUTION INTRAVENOUS at 23:30

## 2023-12-05 RX ADMIN — HYDROCORTISONE: 10 LOTION TOPICAL at 09:05

## 2023-12-05 RX ADMIN — SODIUM CHLORIDE, SODIUM LACTATE, POTASSIUM CHLORIDE, CALCIUM CHLORIDE AND DEXTROSE MONOHYDRATE 75 ML/HR: 5; 600; 310; 30; 20 INJECTION, SOLUTION INTRAVENOUS at 21:37

## 2023-12-05 RX ADMIN — SERTRALINE HYDROCHLORIDE 50 MG: 50 TABLET ORAL at 08:54

## 2023-12-05 RX ADMIN — LEVETIRACETAM 750 MG: 500 TABLET, FILM COATED ORAL at 08:54

## 2023-12-05 RX ADMIN — LACOSAMIDE 100 MG: 100 TABLET, FILM COATED ORAL at 21:32

## 2023-12-05 RX ADMIN — Medication 5 DROP: at 21:30

## 2023-12-05 RX ADMIN — LEVETIRACETAM 750 MG: 500 TABLET, FILM COATED ORAL at 21:32

## 2023-12-05 RX ADMIN — DOCUSATE SODIUM 100 MG: 100 CAPSULE, LIQUID FILLED ORAL at 21:32

## 2023-12-05 RX ADMIN — HYDROXYZINE PAMOATE 25 MG: 25 CAPSULE ORAL at 01:11

## 2023-12-05 RX ADMIN — METOPROLOL SUCCINATE 50 MG: 50 TABLET, EXTENDED RELEASE ORAL at 08:54

## 2023-12-05 RX ADMIN — INSULIN LISPRO 1 UNITS: 100 INJECTION, SOLUTION INTRAVENOUS; SUBCUTANEOUS at 12:41

## 2023-12-05 RX ADMIN — Medication 100 MG: at 08:54

## 2023-12-05 RX ADMIN — BUDESONIDE 0.5 MG: 0.5 INHALANT ORAL at 06:48

## 2023-12-05 RX ADMIN — HYDROCORTISONE: 10 LOTION TOPICAL at 21:30

## 2023-12-05 RX ADMIN — CASTOR OIL AND BALSAM, PERU: 788; 87 OINTMENT TOPICAL at 21:30

## 2023-12-05 RX ADMIN — PIPERACILLIN SODIUM AND TAZOBACTAM SODIUM 3.38 G: 3; .375 INJECTION, SOLUTION INTRAVENOUS at 11:48

## 2023-12-05 RX ADMIN — IPRATROPIUM BROMIDE AND ALBUTEROL SULFATE 3 ML: 2.5; .5 SOLUTION RESPIRATORY (INHALATION) at 06:48

## 2023-12-05 RX ADMIN — DOCUSATE SODIUM 100 MG: 100 CAPSULE, LIQUID FILLED ORAL at 08:54

## 2023-12-05 RX ADMIN — PIPERACILLIN SODIUM AND TAZOBACTAM SODIUM 3.38 G: 3; .375 INJECTION, SOLUTION INTRAVENOUS at 17:24

## 2023-12-05 RX ADMIN — BUDESONIDE 0.5 MG: 0.5 INHALANT ORAL at 18:54

## 2023-12-05 RX ADMIN — Medication 1 TABLET: at 08:54

## 2023-12-05 RX ADMIN — LACOSAMIDE 100 MG: 100 TABLET, FILM COATED ORAL at 08:54

## 2023-12-05 RX ADMIN — INSULIN LISPRO 1 UNITS: 100 INJECTION, SOLUTION INTRAVENOUS; SUBCUTANEOUS at 17:24

## 2023-12-05 RX ADMIN — SODIUM CHLORIDE, SODIUM LACTATE, POTASSIUM CHLORIDE, CALCIUM CHLORIDE AND DEXTROSE MONOHYDRATE 75 ML/HR: 5; 600; 310; 30; 20 INJECTION, SOLUTION INTRAVENOUS at 05:27

## 2023-12-05 RX ADMIN — LORATADINE 5 MG: 10 TABLET ORAL at 08:54

## 2023-12-05 RX ADMIN — Medication 5 DROP: at 09:05

## 2023-12-05 RX ADMIN — OXYCODONE HYDROCHLORIDE AND ACETAMINOPHEN 1 TABLET: 5; 325 TABLET ORAL at 08:54

## 2023-12-05 RX ADMIN — FOLIC ACID 1 MG: 1 TABLET ORAL at 08:54

## 2023-12-05 RX ADMIN — INSULIN LISPRO 1 UNITS: 100 INJECTION, SOLUTION INTRAVENOUS; SUBCUTANEOUS at 09:00

## 2023-12-05 RX ADMIN — Medication 1 TABLET: at 21:32

## 2023-12-05 RX ADMIN — IPRATROPIUM BROMIDE AND ALBUTEROL SULFATE 3 ML: 2.5; .5 SOLUTION RESPIRATORY (INHALATION) at 18:55

## 2023-12-05 RX ADMIN — ESOMEPRAZOLE MAGNESIUM 40 MG: 40 FOR SUSPENSION ORAL at 08:54

## 2023-12-05 RX ADMIN — CASTOR OIL AND BALSAM, PERU: 788; 87 OINTMENT TOPICAL at 09:06

## 2023-12-05 RX ADMIN — SODIUM CITRATE AND CITRIC ACID MONOHYDRATE 30 ML: 500; 334 SOLUTION ORAL at 21:32

## 2023-12-05 RX ADMIN — OXYCODONE HYDROCHLORIDE AND ACETAMINOPHEN 1 TABLET: 5; 325 TABLET ORAL at 21:31

## 2023-12-05 RX ADMIN — SODIUM CITRATE AND CITRIC ACID MONOHYDRATE 30 ML: 500; 334 SOLUTION ORAL at 08:59

## 2023-12-05 RX ADMIN — PIPERACILLIN SODIUM AND TAZOBACTAM SODIUM 3.38 G: 3; .375 INJECTION, SOLUTION INTRAVENOUS at 05:27

## 2023-12-05 RX ADMIN — Medication 125 MCG: at 08:54

## 2023-12-05 ASSESSMENT — COGNITIVE AND FUNCTIONAL STATUS - GENERAL
DAILY ACTIVITIY SCORE: 13
DRESSING REGULAR UPPER BODY CLOTHING: A LOT
PERSONAL GROOMING: A LOT
HELP NEEDED FOR BATHING: A LOT
DRESSING REGULAR LOWER BODY CLOTHING: A LOT
TOILETING: A LOT
WALKING IN HOSPITAL ROOM: TOTAL
CLIMB 3 TO 5 STEPS WITH RAILING: TOTAL
MOVING FROM LYING ON BACK TO SITTING ON SIDE OF FLAT BED WITH BEDRAILS: A LOT
EATING MEALS: A LITTLE
MOBILITY SCORE: 7
MOVING TO AND FROM BED TO CHAIR: TOTAL
TURNING FROM BACK TO SIDE WHILE IN FLAT BAD: TOTAL
STANDING UP FROM CHAIR USING ARMS: TOTAL

## 2023-12-05 ASSESSMENT — PAIN - FUNCTIONAL ASSESSMENT: PAIN_FUNCTIONAL_ASSESSMENT: 0-10

## 2023-12-05 ASSESSMENT — PAIN SCALES - GENERAL
PAINLEVEL_OUTOF10: 0 - NO PAIN
PAINLEVEL_OUTOF10: 6

## 2023-12-05 ASSESSMENT — PAIN DESCRIPTION - ORIENTATION: ORIENTATION: LEFT

## 2023-12-05 ASSESSMENT — PAIN DESCRIPTION - LOCATION: LOCATION: BACK

## 2023-12-05 NOTE — PROGRESS NOTES
Subjective   Patient is alert with no complaints  Patient heart rate and blood pressure is mildly elevated the dose of Toprol to be adjusted  Renal chemistries downtrending today creatinine 0.93      Objective      General appearance; alert  Neck no JVD no bruit no thyromegaly  Lungs are clear  Heart is rhythmic no gallop no rubs or thrills  Abdomen; active peristalsis no rebound or guarding  ; no CVA tenderness  Extremities; edema 2+  Neurological; pathological reflexes are absent    Vitals 24HR  Heart Rate:  []   Temp:  [36 °C (96.8 °F)-37.2 °C (99 °F)]   Resp:  [18-24]   BP: (130-158)/(77-95)   SpO2:  [95 %-100 %]         Intake/Output last 3 Shifts:    Intake/Output Summary (Last 24 hours) at 12/5/2023 1148  Last data filed at 12/5/2023 1001  Gross per 24 hour   Intake 4315 ml   Output 2050 ml   Net 2265 ml       Results for orders placed or performed during the hospital encounter of 11/24/23 (from the past 24 hour(s))   POCT GLUCOSE   Result Value Ref Range    POCT Glucose 216 (H) 74 - 99 mg/dL   POCT GLUCOSE   Result Value Ref Range    POCT Glucose 163 (H) 74 - 99 mg/dL   POCT GLUCOSE   Result Value Ref Range    POCT Glucose 331 (H) 74 - 99 mg/dL   CBC   Result Value Ref Range    WBC 7.9 4.4 - 11.3 x10*3/uL    nRBC 0.0 0.0 - 0.0 /100 WBCs    RBC 4.47 (L) 4.50 - 5.90 x10*6/uL    Hemoglobin 9.6 (L) 13.5 - 17.5 g/dL    Hematocrit 36.2 (L) 41.0 - 52.0 %    MCV 81 80 - 100 fL    MCH 21.5 (L) 26.0 - 34.0 pg    MCHC 26.5 (L) 32.0 - 36.0 g/dL    RDW 19.3 (H) 11.5 - 14.5 %    Platelets 257 150 - 450 x10*3/uL   Comprehensive metabolic panel   Result Value Ref Range    Glucose 165 (H) 74 - 99 mg/dL    Sodium 142 136 - 145 mmol/L    Potassium 3.8 3.5 - 5.3 mmol/L    Chloride 102 98 - 107 mmol/L    Bicarbonate 36 (H) 21 - 32 mmol/L    Anion Gap 8 (L) 10 - 20 mmol/L    Urea Nitrogen 9 6 - 23 mg/dL    Creatinine 0.93 0.50 - 1.30 mg/dL    eGFR >90 >60 mL/min/1.73m*2    Calcium 8.5 (L) 8.6 - 10.3 mg/dL    Albumin  3.0 (L) 3.4 - 5.0 g/dL    Alkaline Phosphatase 66 33 - 120 U/L    Total Protein 6.0 (L) 6.4 - 8.2 g/dL    AST 13 9 - 39 U/L    Bilirubin, Total 0.2 0.0 - 1.2 mg/dL    ALT 14 10 - 52 U/L   POCT GLUCOSE   Result Value Ref Range    POCT Glucose 158 (H) 74 - 99 mg/dL   POCT GLUCOSE   Result Value Ref Range    POCT Glucose 152 (H) 74 - 99 mg/dL       Assessment/Plan     Acute kidney injury  With improving renal chemistries  Hypocitraturia  Nephrolithiasis bilaterally  Urinary sepsis  Iron deficiency anemia  Bilateral nephrolithiasis  Hypertension  Proteinuria  Plan; continue current therapy     Thank You Very Much for allowing me to participate in this patient care    Efe Orellana MD

## 2023-12-05 NOTE — PROGRESS NOTES
Dionte Sharpe is a 53 y.o. male on day 10 of admission presenting with Kidney stone on left side.      Subjective   Seen today midline placed no complications he can return tonight if possible goldenrod is completed and he is ready for discharge       Objective     Last Recorded Vitals  /70 (BP Location: Right arm, Patient Position: Lying)   Pulse 90   Temp 36.3 °C (97.3 °F) (Temporal)   Resp 20   Wt 99.8 kg (220 lb)   SpO2 93%   Intake/Output last 3 Shifts:    Intake/Output Summary (Last 24 hours) at 12/5/2023 1645  Last data filed at 12/5/2023 1238  Gross per 24 hour   Intake 4261.25 ml   Output 2250 ml   Net 2011.25 ml       Admission Weight  Weight: 99.8 kg (220 lb) (11/24/23 1741)    Daily Weight  11/24/23 : 99.8 kg (220 lb)    Image Results  XR chest 1 view  Narrative: Interpreted By:  Koby Friedman,   STUDY:  XR CHEST 1 VIEW  12/2/2023 2:35 pm      INDICATION:  Signs/Symptoms:Atelectasis      COMPARISON:  06/20/2023      ACCESSION NUMBER(S):  [id number]      ORDERING CLINICIAN:  JEREMIAH CALVERT      TECHNIQUE:  A single AP portable radiograph of the chest was obtained.      FINDINGS:  Multiple cardiac monitoring leads are seen over the chest.  The  tracheostomy tube is unchanged in position. Mild-to-moderate diffuse  interstitial infiltrates are seen bilaterally, and may represent  edema and/or pneumonia. No focal infiltrate, pleural effusion or  pneumothorax is identified. The cardiac silhouette is enlarged,  similar to prior studies.      Impression: Diffuse interstitial infiltrates, as above.      MACRO:  None.      Signed by: Koby Friedman 12/3/2023 11:32 AM  Dictation workstation:   [id number]  Review of systems no fever no nausea vomiting or diarrhea    Physical Exam  Lungs are clear heart has regular rate and rhythm  Relevant Results               Assessment/Plan   This patient currently has cardiac telemetry ordered; if you would like to modify or discontinue the telemetry order, click  here to go to the orders activity to modify/discontinue the order.              Principal Problem:    Kidney stone on left side                  Eddie Rodriguez,

## 2023-12-05 NOTE — CARE PLAN
Problem: Skin  Goal: Decreased wound size/increased tissue granulation at next dressing change  Outcome: Progressing  Flowsheets (Taken 12/5/2023 0047)  Decreased wound size/increased tissue granulation at next dressing change: Promote sleep for wound healing  Goal: Participates in plan/prevention/treatment measures  Outcome: Progressing  Flowsheets (Taken 12/5/2023 0047)  Participates in plan/prevention/treatment measures: Elevate heels  Goal: Prevent/manage excess moisture  Outcome: Progressing  Flowsheets (Taken 12/5/2023 0047)  Prevent/manage excess moisture: Cleanse incontinence/protect with barrier cream  Goal: Prevent/minimize sheer/friction injuries  Outcome: Progressing  Flowsheets (Taken 12/5/2023 0047)  Prevent/minimize sheer/friction injuries:   HOB 30 degrees or less   Turn/reposition every 2 hours/use positioning/transfer devices  Goal: Promote/optimize nutrition  Outcome: Progressing  Flowsheets (Taken 12/5/2023 0047)  Promote/optimize nutrition:   Assist with feeding   Monitor/record intake including meals  Goal: Promote skin healing  Outcome: Progressing  Flowsheets (Taken 12/5/2023 0047)  Promote skin healing: Turn/reposition every 2 hours/use positioning/transfer devices     Problem: Pain  Goal: My pain/discomfort is manageable  Outcome: Progressing     Problem: Safety  Goal: Patient will be injury free during hospitalization  Outcome: Progressing  Goal: I will remain free of falls  Outcome: Progressing     Problem: Daily Care  Goal: Daily care needs are met  Outcome: Progressing     Problem: Psychosocial Needs  Goal: Demonstrates ability to cope with hospitalization/illness  Outcome: Progressing  Goal: Collaborate with me, my family, and caregiver to identify my specific goals  Outcome: Progressing     Problem: Discharge Barriers  Goal: My discharge needs are met  Outcome: Progressing     Problem: Respiratory  Goal: Clear secretions with interventions this shift  Outcome: Progressing  Goal:  Minimize anxiety/maximize coping throughout shift  Outcome: Progressing  Goal: Minimal/no exertional discomfort or dyspnea this shift  Outcome: Progressing  Goal: No signs of respiratory distress (eg. Use of accessory muscles. Peds grunting)  Outcome: Progressing  Goal: Patent airway maintained this shift  Outcome: Progressing  Goal: Tolerate pulmonary toileting this shift  Outcome: Progressing  Goal: Verbalize decreased shortness of breath this shift  Outcome: Progressing  Goal: Wean oxygen to maintain O2 saturation per order/standard this shift  Outcome: Progressing  Goal: Increase self care and/or family involvement in next 24 hours  Outcome: Progressing      The clinical goals for the shift include rest and comfort

## 2023-12-05 NOTE — PROGRESS NOTES
Received update from nursing that patient has midline placed. Secure chat sent to Dr. Rodriguez for gama carito to be completed for discharge.
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
## 2023-12-06 ENCOUNTER — APPOINTMENT (OUTPATIENT)
Dept: RADIOLOGY | Facility: HOSPITAL | Age: 53
End: 2023-12-06
Payer: COMMERCIAL

## 2023-12-06 VITALS
OXYGEN SATURATION: 99 % | RESPIRATION RATE: 22 BRPM | HEART RATE: 85 BPM | BODY MASS INDEX: 30.8 KG/M2 | SYSTOLIC BLOOD PRESSURE: 123 MMHG | HEIGHT: 71 IN | DIASTOLIC BLOOD PRESSURE: 71 MMHG | TEMPERATURE: 97.3 F | WEIGHT: 220 LBS

## 2023-12-06 LAB
GLUCOSE BLD MANUAL STRIP-MCNC: 218 MG/DL (ref 74–99)
STAPHYLOCOCCUS SPEC CULT: NORMAL

## 2023-12-06 PROCEDURE — 94640 AIRWAY INHALATION TREATMENT: CPT

## 2023-12-06 PROCEDURE — 2500000001 HC RX 250 WO HCPCS SELF ADMINISTERED DRUGS (ALT 637 FOR MEDICARE OP): Performed by: INTERNAL MEDICINE

## 2023-12-06 PROCEDURE — 71045 X-RAY EXAM CHEST 1 VIEW: CPT | Performed by: RADIOLOGY

## 2023-12-06 PROCEDURE — 71045 X-RAY EXAM CHEST 1 VIEW: CPT

## 2023-12-06 PROCEDURE — 2500000001 HC RX 250 WO HCPCS SELF ADMINISTERED DRUGS (ALT 637 FOR MEDICARE OP): Performed by: NURSE PRACTITIONER

## 2023-12-06 PROCEDURE — 82947 ASSAY GLUCOSE BLOOD QUANT: CPT

## 2023-12-06 PROCEDURE — 2500000004 HC RX 250 GENERAL PHARMACY W/ HCPCS (ALT 636 FOR OP/ED): Performed by: NURSE PRACTITIONER

## 2023-12-06 PROCEDURE — 2500000002 HC RX 250 W HCPCS SELF ADMINISTERED DRUGS (ALT 637 FOR MEDICARE OP, ALT 636 FOR OP/ED): Performed by: NURSE PRACTITIONER

## 2023-12-06 PROCEDURE — 2500000004 HC RX 250 GENERAL PHARMACY W/ HCPCS (ALT 636 FOR OP/ED): Performed by: INTERNAL MEDICINE

## 2023-12-06 RX ADMIN — LEVETIRACETAM 750 MG: 500 TABLET, FILM COATED ORAL at 09:01

## 2023-12-06 RX ADMIN — Medication 100 MG: at 09:09

## 2023-12-06 RX ADMIN — ESOMEPRAZOLE MAGNESIUM 40 MG: 40 FOR SUSPENSION ORAL at 09:04

## 2023-12-06 RX ADMIN — INSULIN LISPRO 2 UNITS: 100 INJECTION, SOLUTION INTRAVENOUS; SUBCUTANEOUS at 09:09

## 2023-12-06 RX ADMIN — PIPERACILLIN SODIUM AND TAZOBACTAM SODIUM 3.38 G: 3; .375 INJECTION, SOLUTION INTRAVENOUS at 05:18

## 2023-12-06 RX ADMIN — Medication 5 DROP: at 09:13

## 2023-12-06 RX ADMIN — OXYCODONE HYDROCHLORIDE AND ACETAMINOPHEN 1 TABLET: 5; 325 TABLET ORAL at 09:02

## 2023-12-06 RX ADMIN — Medication 1 TABLET: at 09:02

## 2023-12-06 RX ADMIN — DOCUSATE SODIUM 100 MG: 100 CAPSULE, LIQUID FILLED ORAL at 09:02

## 2023-12-06 RX ADMIN — CASTOR OIL AND BALSAM, PERU: 788; 87 OINTMENT TOPICAL at 09:13

## 2023-12-06 RX ADMIN — LORATADINE 5 MG: 10 TABLET ORAL at 09:01

## 2023-12-06 RX ADMIN — FOLIC ACID 1 MG: 1 TABLET ORAL at 09:02

## 2023-12-06 RX ADMIN — SERTRALINE HYDROCHLORIDE 50 MG: 50 TABLET ORAL at 09:02

## 2023-12-06 RX ADMIN — BUDESONIDE 0.5 MG: 0.5 INHALANT ORAL at 07:18

## 2023-12-06 RX ADMIN — SODIUM CITRATE AND CITRIC ACID MONOHYDRATE 30 ML: 500; 334 SOLUTION ORAL at 09:02

## 2023-12-06 RX ADMIN — LACOSAMIDE 100 MG: 100 TABLET, FILM COATED ORAL at 09:01

## 2023-12-06 RX ADMIN — METOPROLOL SUCCINATE 100 MG: 50 TABLET, EXTENDED RELEASE ORAL at 09:01

## 2023-12-06 ASSESSMENT — PAIN SCALES - GENERAL: PAINLEVEL_OUTOF10: 0 - NO PAIN

## 2023-12-06 NOTE — CARE PLAN
Problem: Skin  Goal: Decreased wound size/increased tissue granulation at next dressing change  Outcome: Progressing  Flowsheets (Taken 12/6/2023 0115)  Decreased wound size/increased tissue granulation at next dressing change: Promote sleep for wound healing  Goal: Participates in plan/prevention/treatment measures  Outcome: Progressing  Flowsheets (Taken 12/6/2023 0115)  Participates in plan/prevention/treatment measures: Elevate heels  Goal: Prevent/manage excess moisture  Outcome: Progressing  Flowsheets (Taken 12/6/2023 0115)  Prevent/manage excess moisture:   Cleanse incontinence/protect with barrier cream   Moisturize dry skin  Goal: Prevent/minimize sheer/friction injuries  Outcome: Progressing  Flowsheets (Taken 12/6/2023 0115)  Prevent/minimize sheer/friction injuries:   HOB 30 degrees or less   Turn/reposition every 2 hours/use positioning/transfer devices  Goal: Promote/optimize nutrition  Outcome: Progressing  Flowsheets (Taken 12/6/2023 0115)  Promote/optimize nutrition:   Assist with feeding   Monitor/record intake including meals   Offer water/supplements/favorite foods  Goal: Promote skin healing  Outcome: Progressing  Flowsheets (Taken 12/6/2023 0115)  Promote skin healing: Turn/reposition every 2 hours/use positioning/transfer devices     Problem: Pain  Goal: My pain/discomfort is manageable  Outcome: Progressing     Problem: Safety  Goal: Patient will be injury free during hospitalization  Outcome: Progressing  Goal: I will remain free of falls  Outcome: Progressing     Problem: Daily Care  Goal: Daily care needs are met  Outcome: Progressing     Problem: Psychosocial Needs  Goal: Demonstrates ability to cope with hospitalization/illness  Outcome: Progressing  Goal: Collaborate with me, my family, and caregiver to identify my specific goals  Outcome: Progressing     Problem: Discharge Barriers  Goal: My discharge needs are met  Outcome: Progressing     Problem: Respiratory  Goal: Clear  secretions with interventions this shift  Outcome: Progressing  Goal: Minimize anxiety/maximize coping throughout shift  Outcome: Progressing  Goal: Minimal/no exertional discomfort or dyspnea this shift  Outcome: Progressing  Goal: No signs of respiratory distress (eg. Use of accessory muscles. Peds grunting)  Outcome: Progressing  Goal: Patent airway maintained this shift  Outcome: Progressing  Goal: Tolerate pulmonary toileting this shift  Outcome: Progressing  Goal: Verbalize decreased shortness of breath this shift  Outcome: Progressing  Goal: Wean oxygen to maintain O2 saturation per order/standard this shift  Outcome: Progressing  Goal: Increase self care and/or family involvement in next 24 hours  Outcome: Progressing     Problem: Pain - Adult  Goal: Verbalizes/displays adequate comfort level or baseline comfort level  Outcome: Progressing     Problem: Safety - Adult  Goal: Free from fall injury  Outcome: Progressing     Problem: Discharge Planning  Goal: Discharge to home or other facility with appropriate resources  Outcome: Progressing     Problem: Chronic Conditions and Co-morbidities  Goal: Patient's chronic conditions and co-morbidity symptoms are monitored and maintained or improved  Outcome: Progressing      The clinical goals for the shift include rest and comfort

## 2023-12-06 NOTE — PROGRESS NOTES
"    Subjective   Patient vitals are stable  Renal chemistries back to baseline  Hemoglobin today is 9.6  Chest x-ray today is pending      Objective      General appearance; alert  Neck no JVD no bruit no thyromegaly  Lungs are clear  Heart is rhythmic no gallop no rubs or thrills  Abdomen; active peristalsis no rebound or guarding  ; no CVA tenderness  Extremities; edema 2+  Neurological; pathological reflexes are absent    Vitals 24HR  Heart Rate:  [77-90]   Temp:  [35.9 °C (96.6 °F)-36.9 °C (98.4 °F)]   Resp:  [20-28]   BP: (116-124)/(61-73)   Height:  [180.3 cm (5' 10.98\")]   Weight:  [99.8 kg (220 lb)]   SpO2:  [93 %-100 %]         Intake/Output last 3 Shifts:    Intake/Output Summary (Last 24 hours) at 12/6/2023 0942  Last data filed at 12/6/2023 0900  Gross per 24 hour   Intake 1883.75 ml   Output 2225 ml   Net -341.25 ml       Results for orders placed or performed during the hospital encounter of 11/24/23 (from the past 24 hour(s))   POCT GLUCOSE   Result Value Ref Range    POCT Glucose 175 (H) 74 - 99 mg/dL   POCT GLUCOSE   Result Value Ref Range    POCT Glucose 198 (H) 74 - 99 mg/dL   POCT GLUCOSE   Result Value Ref Range    POCT Glucose 207 (H) 74 - 99 mg/dL   POCT GLUCOSE   Result Value Ref Range    POCT Glucose 218 (H) 74 - 99 mg/dL       Assessment/Plan     Acute kidney injury  With improving renal chemistries  Hypocitraturia  Nephrolithiasis bilaterally  Urinary sepsis  Iron deficiency anemia  Bilateral nephrolithiasis  Hypertension  Proteinuria  Plan; continue current therapy  Continue Bicitra   Thank You Very Much for allowing me to participate in this patient care    Efe Orellana MD      "

## 2023-12-06 NOTE — PROGRESS NOTES
12/06/23 0904   Discharge Planning   Type of Post Acute Facility Services Skilled nursing   Patient expects to be discharged to: [facility]- Return   Does the patient need discharge transport arranged? Yes   RoundTrip coordination needed? Yes     Patient has written discharge order. Confirmed with [facility] that auth is still good and they can accept patient back today. Confirmed with nursing, requested for [facility] to send orders and arrange transport.     Addendum 0949: Patient updated at bedside on transport time of 10:30 AM to return to [facility]. Patient requested that [facility] call and update his sister, called and spoke with patients sister and updated on transport time of 10:30 AM to [facility]. Unit secretary updated. Bedside RN updated and provided with report number.
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
## 2024-01-23 LAB
ATRIAL RATE: 105 BPM
ATRIAL RATE: 150 BPM
P AXIS: 32 DEGREES
P AXIS: 43 DEGREES
PR INTERVAL: 116 MS
PR INTERVAL: 123 MS
Q ONSET: 253 MS
Q ONSET: 253 MS
QRS COUNT: 17 BEATS
QRS COUNT: 24 BEATS
QRS DURATION: 82 MS
QRS DURATION: 90 MS
QT INTERVAL: 273 MS
QT INTERVAL: 325 MS
QTC CALCULATION(BAZETT): 430 MS
QTC CALCULATION(BAZETT): 430 MS
QTC FREDERICIA: 369 MS
QTC FREDERICIA: 391 MS
R AXIS: -24 DEGREES
R AXIS: -26 DEGREES
T AXIS: 30 DEGREES
T AXIS: 87 DEGREES
T OFFSET: 389 MS
T OFFSET: 416 MS
VENTRICULAR RATE: 105 BPM
VENTRICULAR RATE: 149 BPM

## 2024-05-03 DIAGNOSIS — N20.0 KIDNEY STONE ON LEFT SIDE: ICD-10-CM

## 2024-05-03 NOTE — PROGRESS NOTES
Pt's SNF called , pt is having blood in his nephrostomy tube . His Nurse states they don't think it has been pulled .Neph tube was placed  November 24th d/t left obstructing ureteral calculus.   Pt's Nurse states  the tube has never been changed, they just change his bag every week .   asked that he increase hydration .   Pt is doing well otherwise . he has an apt with Dr Irving  in July ,  Dr Irving alerted .  Pt will need  an apt with IR to change tube.  Order placed . Will call and make an apt for pt and give information to SNF

## 2024-05-06 ENCOUNTER — DOCUMENTATION (OUTPATIENT)
Dept: UROLOGY | Facility: CLINIC | Age: 54
End: 2024-05-06
Payer: COMMERCIAL

## 2024-05-06 NOTE — PROGRESS NOTES
Nurse contacted ANNE Reynolds . Pt has an apt on 5/28/24 at [facility]  at 0900 be there at 0800 for left Nephrostomy tube exchange. Nurse contacted pt's [phone]  spoke to pt's nurse Stephany , information understood .  Pt's nephrostomy tube does not have any blood today . Dr Irving ok with date

## 2024-05-28 ENCOUNTER — APPOINTMENT (OUTPATIENT)
Dept: RADIOLOGY | Facility: HOSPITAL | Age: 54
End: 2024-05-28
Payer: COMMERCIAL

## 2024-05-28 ENCOUNTER — DOCUMENTATION (OUTPATIENT)
Dept: UROLOGY | Facility: CLINIC | Age: 54
End: 2024-05-28
Payer: COMMERCIAL

## 2024-05-28 NOTE — PROGRESS NOTES
Pt was scheduled for [facility] to exchange Neph tube today . This nurse contacted pt's nurse at [facility] to inform the date and IR nurse called to go over instructions but pt did not show up today .  This nurse contacted [facility] # [id number]  spoke to nurse manager who states they did not know pt had apt today . This nurse contacted [facility] and spoke to Brittney who states she will contact SNF today with another apt .

## 2024-06-13 ENCOUNTER — HOSPITAL ENCOUNTER (OUTPATIENT)
Dept: RADIOLOGY | Facility: HOSPITAL | Age: 54
Discharge: HOME | End: 2024-06-13
Payer: COMMERCIAL

## 2024-06-13 ENCOUNTER — APPOINTMENT (OUTPATIENT)
Dept: RADIOLOGY | Facility: HOSPITAL | Age: 54
End: 2024-06-13
Payer: COMMERCIAL

## 2024-06-13 VITALS
TEMPERATURE: 98.1 F | HEIGHT: 71 IN | WEIGHT: 220.02 LBS | DIASTOLIC BLOOD PRESSURE: 76 MMHG | SYSTOLIC BLOOD PRESSURE: 112 MMHG | HEART RATE: 97 BPM | BODY MASS INDEX: 30.8 KG/M2 | OXYGEN SATURATION: 95 % | RESPIRATION RATE: 20 BRPM

## 2024-06-13 DIAGNOSIS — N20.0 KIDNEY STONE ON LEFT SIDE: ICD-10-CM

## 2024-06-13 PROCEDURE — 2720000007 HC OR 272 NO HCPCS

## 2024-06-13 PROCEDURE — C1769 GUIDE WIRE: HCPCS

## 2024-06-13 PROCEDURE — 2550000001 HC RX 255 CONTRASTS: Performed by: STUDENT IN AN ORGANIZED HEALTH CARE EDUCATION/TRAINING PROGRAM

## 2024-06-13 PROCEDURE — 7100000010 HC PHASE TWO TIME - EACH INCREMENTAL 1 MINUTE

## 2024-06-13 PROCEDURE — C1729 CATH, DRAINAGE: HCPCS

## 2024-06-13 PROCEDURE — 50435 EXCHANGE NEPHROSTOMY CATH: CPT

## 2024-06-13 PROCEDURE — 2500000005 HC RX 250 GENERAL PHARMACY W/O HCPCS: Performed by: STUDENT IN AN ORGANIZED HEALTH CARE EDUCATION/TRAINING PROGRAM

## 2024-06-13 PROCEDURE — 7100000009 HC PHASE TWO TIME - INITIAL BASE CHARGE

## 2024-06-13 PROCEDURE — 36251 INS CATH REN ART 1ST UNILAT: CPT | Mod: LT

## 2024-06-13 RX ORDER — IBUPROFEN 800 MG/1
800 TABLET ORAL ONCE
Status: DISCONTINUED | OUTPATIENT
Start: 2024-06-13 | End: 2024-06-14 | Stop reason: HOSPADM

## 2024-06-13 RX ORDER — LIDOCAINE HYDROCHLORIDE 10 MG/ML
INJECTION, SOLUTION EPIDURAL; INFILTRATION; INTRACAUDAL; PERINEURAL
Status: COMPLETED | OUTPATIENT
Start: 2024-06-13 | End: 2024-06-13

## 2024-06-13 ASSESSMENT — PAIN SCALES - GENERAL
PAINLEVEL_OUTOF10: 8
PAINLEVEL_OUTOF10: 0 - NO PAIN

## 2024-06-13 ASSESSMENT — PAIN - FUNCTIONAL ASSESSMENT
PAIN_FUNCTIONAL_ASSESSMENT: 0-10
PAIN_FUNCTIONAL_ASSESSMENT: 0-10

## 2024-06-13 ASSESSMENT — PAIN DESCRIPTION - DESCRIPTORS: DESCRIPTORS: TENDER

## 2024-06-13 NOTE — PRE-PROCEDURE NOTE
Interventional Radiology Preprocedure Note    Indication for procedure: The encounter diagnosis was Kidney stone on left side.    Relevant review of systems: NA    Relevant Labs:   Lab Results   Component Value Date    CREATININE 0.89 05/10/2024    EGFR >90 05/10/2024    INR 1.3 (H) 11/25/2023    PROTIME 14.4 (H) 11/25/2023       Planned Sedation/Anesthesia: Minimal    Airway assessment: normal    Directed physical examination:    Physical Exam  Constitutional:       Appearance: Normal appearance.   Cardiovascular:      Rate and Rhythm: Normal rate and regular rhythm.   Pulmonary:      Effort: Pulmonary effort is normal.      Breath sounds: Normal breath sounds.   Neurological:      Mental Status: He is alert.          Mallampati: II (hard and soft palate, upper portion of tonsils and uvula visible)    ASA Score: ASA 2 - Patient with mild systemic disease with no functional limitations    Benefits, risks and alternatives of procedure and planned sedation have been discussed with the patient and/or their representative. All questions answered and they agree to proceed.

## 2024-06-13 NOTE — POST-PROCEDURE NOTE
Interventional Radiology Brief Postprocedure Note    Attending: Eb Christian MD      Assistant: none    Diagnosis: left percutaneous nephrostomy tube    Description of procedure: left percutaneous nephrostomy tube exchang     Anesthesia:  Local    Complications: None    Estimated Blood Loss: none    Medications (Filter: Administrations occurring from 1010 to 1038 on 06/13/24) As of 06/13/24 1038      lidocaine PF (Xylocaine) 10 mg/mL (1 %) injection (mL) Total volume:  5 mL      Date/Time Rate/Dose/Volume Action       06/13/24  1026 5 mL Given                   No specimens collected      See detailed result report with images in PACS.    The patient tolerated the procedure well without incident or complication and is in stable condition.

## 2024-06-27 PROBLEM — N39.0 URINARY TRACT INFECTION: Status: ACTIVE | Noted: 2024-06-27

## 2024-06-27 PROBLEM — R26.9 GAIT DIFFICULTY: Status: ACTIVE | Noted: 2017-05-02

## 2024-06-27 PROBLEM — J18.9 PNEUMONIA OF BOTH LUNGS DUE TO INFECTIOUS ORGANISM: Status: ACTIVE | Noted: 2024-06-27

## 2024-06-27 PROBLEM — S36.501A: Status: ACTIVE | Noted: 2018-01-29

## 2024-06-27 PROBLEM — S52.102B: Status: ACTIVE | Noted: 2018-01-28

## 2024-06-27 PROBLEM — G89.29 CHRONIC PAIN: Status: ACTIVE | Noted: 2024-06-27

## 2024-06-27 PROBLEM — M25.579 PAIN IN JOINT, ANKLE AND FOOT: Status: ACTIVE | Noted: 2017-05-02

## 2024-06-27 PROBLEM — K94.23 MALFUNCTION OF GASTROSTOMY TUBE (MULTI): Status: ACTIVE | Noted: 2024-06-27

## 2024-06-27 PROBLEM — R00.0 TACHYCARDIA: Status: ACTIVE | Noted: 2018-02-08

## 2024-06-27 PROBLEM — S36.30XA: Status: ACTIVE | Noted: 2018-01-29

## 2024-06-27 PROBLEM — R09.02 HYPOXIA: Status: ACTIVE | Noted: 2024-06-27

## 2024-06-27 PROBLEM — J96.11 CHRONIC RESPIRATORY FAILURE WITH HYPOXIA (MULTI): Status: ACTIVE | Noted: 2021-11-30

## 2024-06-27 PROBLEM — R41.82 AMS (ALTERED MENTAL STATUS): Status: ACTIVE | Noted: 2024-06-27

## 2024-06-27 PROBLEM — T81.41XA SUPERFICIAL INCISIONAL SURGICAL SITE INFECTION: Status: ACTIVE | Noted: 2018-02-04
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
## 2024-06-27 PROBLEM — T14.8XXA OPEN WOUND: Status: ACTIVE | Noted: 2018-04-04

## 2024-06-27 PROBLEM — L60.0 INGROWING NAIL: Status: ACTIVE | Noted: 2017-04-16

## 2024-06-27 PROBLEM — E66.9 OBESITY, CLASS I, BMI 30-34.9: Status: ACTIVE | Noted: 2021-12-22

## 2024-06-27 PROBLEM — G80.9 CEREBRAL PALSY (MULTI): Status: ACTIVE | Noted: 2021-11-30

## 2024-06-27 PROBLEM — R06.00 DYSPNEA: Status: ACTIVE | Noted: 2024-06-27

## 2024-06-27 PROBLEM — T81.32XA DEHISCENCE OF CLOSURE OF FASCIA, SUPERFICIAL OR MUSCULAR, INITIAL ENCOUNTER: Status: ACTIVE | Noted: 2018-02-04

## 2024-06-27 PROBLEM — N21.0 BLADDER CALCULI: Status: ACTIVE | Noted: 2024-06-27

## 2024-06-27 PROBLEM — Z98.890 S/P CYSTOSCOPY: Status: ACTIVE | Noted: 2017-10-10

## 2024-06-27 PROBLEM — K80.51: Status: ACTIVE | Noted: 2017-02-09

## 2024-06-27 PROBLEM — M25.519 PAIN IN JOINT, SHOULDER REGION: Status: ACTIVE | Noted: 2024-06-27

## 2024-06-27 PROBLEM — S21.309A: Status: ACTIVE | Noted: 2018-01-29

## 2024-06-27 PROBLEM — J18.9 PNEUMONIA: Status: ACTIVE | Noted: 2024-06-27

## 2024-06-27 PROBLEM — G81.13: Status: ACTIVE | Noted: 2018-03-12

## 2024-06-27 PROBLEM — M24.576 CONTRACTURE OF JOINT OF FOOT: Status: RESOLVED | Noted: 2024-06-27 | Resolved: 2024-06-27

## 2024-06-27 PROBLEM — E66.811 OBESITY, CLASS I, BMI 30-34.9: Status: ACTIVE | Noted: 2021-12-22

## 2024-06-27 PROBLEM — R33.9 RETENTION OF URINE: Status: ACTIVE | Noted: 2024-06-27

## 2024-06-27 PROBLEM — J96.90 RESPIRATORY FAILURE (MULTI): Status: ACTIVE | Noted: 2021-12-27

## 2024-06-27 PROBLEM — Z86.718 HISTORY OF DVT (DEEP VEIN THROMBOSIS): Status: ACTIVE | Noted: 2021-11-30

## 2024-06-27 PROBLEM — N39.0 RECURRENT UTI: Status: ACTIVE | Noted: 2024-06-27

## 2024-06-27 PROBLEM — G81.11: Status: ACTIVE | Noted: 2017-10-23

## 2024-06-27 PROBLEM — T81.328A DEHISCENCE OF CLOSURE OF FASCIA, SUPERFICIAL OR MUSCULAR, INITIAL ENCOUNTER: Status: ACTIVE | Noted: 2018-02-04
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
## 2024-06-27 PROBLEM — M62.89 MUSCLE TONE INCREASED: Status: ACTIVE | Noted: 2017-05-02

## 2024-06-27 PROBLEM — Z93.6 OTHER ARTIFICIAL OPENINGS OF URINARY TRACT STATUS (MULTI): Status: ACTIVE | Noted: 2024-06-27

## 2024-06-27 PROBLEM — A41.9 SEPSIS (MULTI): Status: ACTIVE | Noted: 2024-06-27

## 2024-06-27 PROBLEM — N30.90 CYSTITIS: Status: ACTIVE | Noted: 2024-06-27

## 2024-06-27 PROBLEM — F41.9 ANXIETY: Status: ACTIVE | Noted: 2024-06-27

## 2024-06-27 PROBLEM — J95.03: Status: ACTIVE | Noted: 2024-06-27

## 2024-06-27 PROBLEM — A41.9 SEPTICEMIA (MULTI): Status: ACTIVE | Noted: 2024-06-27

## 2024-06-27 PROBLEM — K21.9 GERD (GASTROESOPHAGEAL REFLUX DISEASE): Status: ACTIVE | Noted: 2021-11-30

## 2024-06-27 PROBLEM — I82.409 ACUTE DVT (DEEP VENOUS THROMBOSIS) (MULTI): Status: ACTIVE | Noted: 2021-12-22

## 2024-06-27 PROBLEM — Z46.6 URINARY CATHETER INSERTION/ADJUSTMENT/REMOVAL: Status: ACTIVE | Noted: 2024-06-27

## 2024-06-27 PROBLEM — I10 PRIMARY HYPERTENSION: Status: ACTIVE | Noted: 2017-10-23

## 2024-06-27 PROBLEM — S27.809A: Status: ACTIVE | Noted: 2018-01-29

## 2024-06-27 PROBLEM — S27.2XXA TRAUMATIC HEMOPNEUMOTHORAX, INITIAL ENCOUNTER: Status: ACTIVE | Noted: 2018-01-29

## 2024-06-27 PROBLEM — E44.1 MALNUTRITION OF MILD DEGREE (MULTI): Status: ACTIVE | Noted: 2021-12-21

## 2024-06-27 PROBLEM — M72.2 PLANTAR FASCIITIS: Status: ACTIVE | Noted: 2017-04-29

## 2024-06-27 RX ORDER — CEFTRIAXONE 1 G/1
INJECTION, POWDER, FOR SOLUTION INTRAMUSCULAR; INTRAVENOUS
COMMUNITY
Start: 2024-05-05

## 2024-06-27 RX ORDER — AMMONIUM LACTATE 12 G/100G
CREAM TOPICAL
COMMUNITY
Start: 2024-01-18

## 2024-06-27 RX ORDER — PHENAZOPYRIDINE HYDROCHLORIDE 100 MG/1
200 TABLET, FILM COATED ORAL
COMMUNITY

## 2024-06-27 RX ORDER — POLYETHYLENE GLYCOL 3350 17 G/17G
POWDER, FOR SOLUTION ORAL
COMMUNITY
Start: 2022-05-04

## 2024-06-27 RX ORDER — LIDOCAINE HYDROCHLORIDE 10 MG/ML
INJECTION INFILTRATION; PERINEURAL
COMMUNITY
Start: 2024-05-03

## 2024-06-27 RX ORDER — DEXAMETHASONE 6 MG/1
TABLET ORAL
COMMUNITY
Start: 2023-11-13

## 2024-06-27 RX ORDER — MELATONIN 3 MG
CAPSULE ORAL
COMMUNITY
Start: 2022-05-04

## 2024-06-27 RX ORDER — METHYLPREDNISOLONE 4 MG/1
TABLET ORAL
COMMUNITY
Start: 2023-02-22

## 2024-06-27 RX ORDER — MUPIROCIN 20 MG/G
OINTMENT TOPICAL
COMMUNITY
Start: 2024-05-10

## 2024-06-27 RX ORDER — IPRATROPIUM BROMIDE 0.5 MG/2.5ML
2.5 SOLUTION RESPIRATORY (INHALATION) EVERY 2 HOUR PRN
COMMUNITY
Start: 2022-05-25

## 2024-06-27 RX ORDER — DANTROLENE SODIUM 25 MG/1
CAPSULE ORAL
COMMUNITY
Start: 2022-03-23

## 2024-06-27 RX ORDER — PANTOPRAZOLE SODIUM 40 MG/1
FOR SUSPENSION ORAL
COMMUNITY
Start: 2022-02-03

## 2024-06-27 RX ORDER — AMMONIUM LACTATE 12 G/100G
LOTION TOPICAL
COMMUNITY
Start: 2022-02-13

## 2024-06-27 RX ORDER — NIRMATRELVIR AND RITONAVIR 150-100 MG
KIT ORAL
COMMUNITY
Start: 2023-11-13

## 2024-06-27 RX ORDER — ONDANSETRON HYDROCHLORIDE 8 MG/1
TABLET, FILM COATED ORAL
COMMUNITY
Start: 2022-03-23

## 2024-06-27 RX ORDER — HYDROXYZINE HYDROCHLORIDE 25 MG/1
TABLET, FILM COATED ORAL
COMMUNITY
Start: 2023-12-06

## 2024-06-27 RX ORDER — AMOXICILLIN AND CLAVULANATE POTASSIUM 875; 125 MG/1; MG/1
TABLET, FILM COATED ORAL
COMMUNITY
Start: 2023-11-13

## 2024-06-27 RX ORDER — OXYCODONE HYDROCHLORIDE 5 MG/1
TABLET ORAL
COMMUNITY
Start: 2022-03-24

## 2024-06-27 RX ORDER — ZIPRASIDONE HYDROCHLORIDE 20 MG/1
CAPSULE ORAL EVERY 12 HOURS
COMMUNITY

## 2024-06-27 RX ORDER — ALBUTEROL SULFATE 0.83 MG/ML
SOLUTION RESPIRATORY (INHALATION)
COMMUNITY
Start: 2022-03-23

## 2024-06-27 RX ORDER — DEXTROSE MONOHYDRATE AND SODIUM CHLORIDE 5; .9 G/100ML; G/100ML
INJECTION, SOLUTION INTRAVENOUS
COMMUNITY
Start: 2024-03-06

## 2024-06-27 RX ORDER — INSULIN LISPRO 100 [IU]/ML
INJECTION, SOLUTION INTRAVENOUS; SUBCUTANEOUS
COMMUNITY
Start: 2023-02-22

## 2024-06-27 RX ORDER — WATER FOR INJECTION,STERILE
VIAL (ML) INJECTION
COMMUNITY
Start: 2024-05-03

## 2024-06-27 RX ORDER — ESCITALOPRAM OXALATE 10 MG/1
10 TABLET ORAL
COMMUNITY

## 2024-06-27 RX ORDER — AZITHROMYCIN 250 MG/1
TABLET, FILM COATED ORAL
COMMUNITY
Start: 2024-05-17

## 2024-06-27 RX ORDER — LACTULOSE 10 G/15ML
SOLUTION ORAL; RECTAL
COMMUNITY
Start: 2024-03-06

## 2024-06-27 RX ORDER — CIPROFLOXACIN 500 MG/1
TABLET ORAL
COMMUNITY
Start: 2022-05-02

## 2024-06-27 RX ORDER — ALBUTEROL SULFATE 1.25 MG/3ML
SOLUTION RESPIRATORY (INHALATION)
COMMUNITY
Start: 2024-02-28

## 2024-06-27 RX ORDER — WARFARIN 7.5 MG/1
7.5 TABLET ORAL
COMMUNITY

## 2024-06-27 RX ORDER — OMEPRAZOLE 20 MG/1
CAPSULE, DELAYED RELEASE ORAL
COMMUNITY
Start: 2024-05-20

## 2024-06-27 RX ORDER — ACETAMINOPHEN, DIPHENHYDRAMINE HCL, PHENYLEPHRINE HCL 325; 25; 5 MG/1; MG/1; MG/1
TABLET ORAL
COMMUNITY
Start: 2022-05-25

## 2024-06-27 RX ORDER — METOCLOPRAMIDE 10 MG/1
TABLET ORAL
COMMUNITY
Start: 2022-03-24

## 2024-06-27 RX ORDER — METHYLPREDNISOLONE 8 MG/1
TABLET ORAL
COMMUNITY
Start: 2023-02-22

## 2024-06-27 RX ORDER — METOPROLOL TARTRATE 25 MG/1
TABLET, FILM COATED ORAL
COMMUNITY
Start: 2022-04-20

## 2024-07-15 ENCOUNTER — APPOINTMENT (OUTPATIENT)
Dept: UROLOGY | Facility: CLINIC | Age: 54
End: 2024-07-15
Payer: COMMERCIAL
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
## 2024-08-28 ENCOUNTER — APPOINTMENT (OUTPATIENT)
Dept: UROLOGY | Facility: CLINIC | Age: 54
End: 2024-08-28
Payer: COMMERCIAL

## 2024-10-20 ENCOUNTER — HOSPITAL ENCOUNTER (INPATIENT)
Facility: HOSPITAL | Age: 54
LOS: 4 days | Discharge: NURSING, REHAB OR LONG-TERM CARE | End: 2024-10-24
Attending: INTERNAL MEDICINE | Admitting: INTERNAL MEDICINE
Payer: COMMERCIAL

## 2024-10-20 ENCOUNTER — APPOINTMENT (OUTPATIENT)
Dept: RADIOLOGY | Facility: HOSPITAL | Age: 54
End: 2024-10-20
Payer: COMMERCIAL

## 2024-10-20 DIAGNOSIS — N39.0 ACUTE UTI: Primary | ICD-10-CM

## 2024-10-20 DIAGNOSIS — N20.0 KIDNEY STONE ON LEFT SIDE: ICD-10-CM

## 2024-10-20 DIAGNOSIS — T83.022A NEPHROSTOMY TUBE DISPLACED (CMS-HCC): ICD-10-CM

## 2024-10-20 LAB
ALBUMIN SERPL BCP-MCNC: 3.9 G/DL (ref 3.4–5)
ALP SERPL-CCNC: 69 U/L (ref 33–120)
ALT SERPL W P-5'-P-CCNC: 10 U/L (ref 10–52)
ANION GAP SERPL CALC-SCNC: 12 MMOL/L (ref 10–20)
APPEARANCE UR: ABNORMAL
AST SERPL W P-5'-P-CCNC: 22 U/L (ref 9–39)
BACTERIA #/AREA URNS AUTO: ABNORMAL /HPF
BASOPHILS # BLD AUTO: 0.07 X10*3/UL (ref 0–0.1)
BASOPHILS NFR BLD AUTO: 0.6 %
BILIRUB SERPL-MCNC: 0.3 MG/DL (ref 0–1.2)
BILIRUB UR STRIP.AUTO-MCNC: NEGATIVE MG/DL
BUN SERPL-MCNC: 21 MG/DL (ref 6–23)
CALCIUM SERPL-MCNC: 8.9 MG/DL (ref 8.6–10.3)
CHLORIDE SERPL-SCNC: 103 MMOL/L (ref 98–107)
CO2 SERPL-SCNC: 27 MMOL/L (ref 21–32)
COLOR UR: ABNORMAL
CREAT SERPL-MCNC: 0.92 MG/DL (ref 0.5–1.3)
EGFRCR SERPLBLD CKD-EPI 2021: >90 ML/MIN/1.73M*2
EOSINOPHIL # BLD AUTO: 0.58 X10*3/UL (ref 0–0.7)
EOSINOPHIL NFR BLD AUTO: 5.3 %
ERYTHROCYTE [DISTWIDTH] IN BLOOD BY AUTOMATED COUNT: 19.5 % (ref 11.5–14.5)
GLUCOSE SERPL-MCNC: 96 MG/DL (ref 74–99)
GLUCOSE UR STRIP.AUTO-MCNC: NORMAL MG/DL
HCT VFR BLD AUTO: 36.9 % (ref 41–52)
HGB BLD-MCNC: 9.9 G/DL (ref 13.5–17.5)
HOLD SPECIMEN: NORMAL
IMM GRANULOCYTES # BLD AUTO: 0.05 X10*3/UL (ref 0–0.7)
IMM GRANULOCYTES NFR BLD AUTO: 0.5 % (ref 0–0.9)
KETONES UR STRIP.AUTO-MCNC: NEGATIVE MG/DL
LACTATE SERPL-SCNC: 0.7 MMOL/L (ref 0.4–2)
LEUKOCYTE ESTERASE UR QL STRIP.AUTO: ABNORMAL
LYMPHOCYTES # BLD AUTO: 2.21 X10*3/UL (ref 1.2–4.8)
LYMPHOCYTES NFR BLD AUTO: 20.2 %
MAGNESIUM SERPL-MCNC: 1.82 MG/DL (ref 1.6–2.4)
MCH RBC QN AUTO: 19 PG (ref 26–34)
MCHC RBC AUTO-ENTMCNC: 26.8 G/DL (ref 32–36)
MCV RBC AUTO: 71 FL (ref 80–100)
MONOCYTES # BLD AUTO: 1.1 X10*3/UL (ref 0.1–1)
MONOCYTES NFR BLD AUTO: 10.1 %
NEUTROPHILS # BLD AUTO: 6.91 X10*3/UL (ref 1.2–7.7)
NEUTROPHILS NFR BLD AUTO: 63.3 %
NITRITE UR QL STRIP.AUTO: NEGATIVE
NRBC BLD-RTO: 0 /100 WBCS (ref 0–0)
PH UR STRIP.AUTO: 7 [PH]
PLATELET # BLD AUTO: 366 X10*3/UL (ref 150–450)
POTASSIUM SERPL-SCNC: 4.3 MMOL/L (ref 3.5–5.3)
PROT SERPL-MCNC: 7.3 G/DL (ref 6.4–8.2)
PROT UR STRIP.AUTO-MCNC: ABNORMAL MG/DL
RBC # BLD AUTO: 5.2 X10*6/UL (ref 4.5–5.9)
RBC # UR STRIP.AUTO: ABNORMAL /UL
RBC #/AREA URNS AUTO: >20 /HPF
SODIUM SERPL-SCNC: 138 MMOL/L (ref 136–145)
SP GR UR STRIP.AUTO: 1.01
UROBILINOGEN UR STRIP.AUTO-MCNC: NORMAL MG/DL
WBC # BLD AUTO: 10.9 X10*3/UL (ref 4.4–11.3)
WBC #/AREA URNS AUTO: >50 /HPF
WBC CLUMPS #/AREA URNS AUTO: ABNORMAL /HPF

## 2024-10-20 PROCEDURE — 1100000001 HC PRIVATE ROOM DAILY

## 2024-10-20 PROCEDURE — 81001 URINALYSIS AUTO W/SCOPE: CPT | Performed by: INTERNAL MEDICINE

## 2024-10-20 PROCEDURE — 71045 X-RAY EXAM CHEST 1 VIEW: CPT | Performed by: RADIOLOGY

## 2024-10-20 PROCEDURE — 36415 COLL VENOUS BLD VENIPUNCTURE: CPT | Performed by: INTERNAL MEDICINE

## 2024-10-20 PROCEDURE — G0378 HOSPITAL OBSERVATION PER HR: HCPCS

## 2024-10-20 PROCEDURE — 83735 ASSAY OF MAGNESIUM: CPT | Performed by: INTERNAL MEDICINE

## 2024-10-20 PROCEDURE — 99285 EMERGENCY DEPT VISIT HI MDM: CPT | Mod: 25

## 2024-10-20 PROCEDURE — 85025 COMPLETE CBC W/AUTO DIFF WBC: CPT | Performed by: INTERNAL MEDICINE

## 2024-10-20 PROCEDURE — 2500000004 HC RX 250 GENERAL PHARMACY W/ HCPCS (ALT 636 FOR OP/ED): Performed by: INTERNAL MEDICINE

## 2024-10-20 PROCEDURE — 83605 ASSAY OF LACTIC ACID: CPT | Performed by: INTERNAL MEDICINE

## 2024-10-20 PROCEDURE — 87040 BLOOD CULTURE FOR BACTERIA: CPT | Mod: PARLAB | Performed by: INTERNAL MEDICINE

## 2024-10-20 PROCEDURE — 80053 COMPREHEN METABOLIC PANEL: CPT | Performed by: INTERNAL MEDICINE

## 2024-10-20 PROCEDURE — 87086 URINE CULTURE/COLONY COUNT: CPT | Mod: PARLAB | Performed by: INTERNAL MEDICINE

## 2024-10-20 PROCEDURE — 96366 THER/PROPH/DIAG IV INF ADDON: CPT

## 2024-10-20 PROCEDURE — 96365 THER/PROPH/DIAG IV INF INIT: CPT

## 2024-10-20 PROCEDURE — 2500000005 HC RX 250 GENERAL PHARMACY W/O HCPCS: Performed by: INTERNAL MEDICINE

## 2024-10-20 PROCEDURE — 71045 X-RAY EXAM CHEST 1 VIEW: CPT

## 2024-10-20 PROCEDURE — 2500000004 HC RX 250 GENERAL PHARMACY W/ HCPCS (ALT 636 FOR OP/ED)

## 2024-10-20 RX ORDER — MEROPENEM 1 G/1
1 INJECTION, POWDER, FOR SOLUTION INTRAVENOUS EVERY 8 HOURS
Status: DISCONTINUED | OUTPATIENT
Start: 2024-10-20 | End: 2024-10-24 | Stop reason: HOSPADM

## 2024-10-20 RX ORDER — MEROPENEM 1 G/1
1 INJECTION, POWDER, FOR SOLUTION INTRAVENOUS ONCE
Status: COMPLETED | OUTPATIENT
Start: 2024-10-20 | End: 2024-10-20

## 2024-10-20 RX ORDER — ACETAMINOPHEN 325 MG/1
650 TABLET ORAL EVERY 4 HOURS PRN
Status: DISCONTINUED | OUTPATIENT
Start: 2024-10-20 | End: 2024-10-24 | Stop reason: HOSPADM

## 2024-10-20 RX ADMIN — MEROPENEM 1 G: 1 INJECTION, POWDER, FOR SOLUTION INTRAVENOUS at 21:50

## 2024-10-20 RX ADMIN — Medication 40 L/MIN: at 11:00

## 2024-10-20 RX ADMIN — MEROPENEM 1 G: 1 INJECTION, POWDER, FOR SOLUTION INTRAVENOUS at 14:15

## 2024-10-20 RX ADMIN — MEROPENEM 1 G: 1 INJECTION, POWDER, FOR SOLUTION INTRAVENOUS at 06:24

## 2024-10-20 SDOH — SOCIAL STABILITY: SOCIAL INSECURITY: WITHIN THE LAST YEAR, HAVE YOU BEEN AFRAID OF YOUR PARTNER OR EX-PARTNER?: NO

## 2024-10-20 SDOH — SOCIAL STABILITY: SOCIAL INSECURITY: WERE YOU ABLE TO COMPLETE ALL THE BEHAVIORAL HEALTH SCREENINGS?: YES

## 2024-10-20 SDOH — SOCIAL STABILITY: SOCIAL INSECURITY
WITHIN THE LAST YEAR, HAVE YOU BEEN KICKED, HIT, SLAPPED, OR OTHERWISE PHYSICALLY HURT BY YOUR PARTNER OR EX-PARTNER?: NO

## 2024-10-20 SDOH — SOCIAL STABILITY: SOCIAL INSECURITY: HAVE YOU HAD THOUGHTS OF HARMING ANYONE ELSE?: NO

## 2024-10-20 SDOH — ECONOMIC STABILITY: FOOD INSECURITY: WITHIN THE PAST 12 MONTHS, YOU WORRIED THAT YOUR FOOD WOULD RUN OUT BEFORE YOU GOT THE MONEY TO BUY MORE.: NEVER TRUE

## 2024-10-20 SDOH — ECONOMIC STABILITY: INCOME INSECURITY: IN THE PAST 12 MONTHS HAS THE ELECTRIC, GAS, OIL, OR WATER COMPANY THREATENED TO SHUT OFF SERVICES IN YOUR HOME?: NO

## 2024-10-20 SDOH — SOCIAL STABILITY: SOCIAL INSECURITY: ARE THERE ANY APPARENT SIGNS OF INJURIES/BEHAVIORS THAT COULD BE RELATED TO ABUSE/NEGLECT?: NO

## 2024-10-20 SDOH — SOCIAL STABILITY: SOCIAL INSECURITY: WITHIN THE LAST YEAR, HAVE YOU BEEN HUMILIATED OR EMOTIONALLY ABUSED IN OTHER WAYS BY YOUR PARTNER OR EX-PARTNER?: NO

## 2024-10-20 SDOH — ECONOMIC STABILITY: FOOD INSECURITY: WITHIN THE PAST 12 MONTHS, THE FOOD YOU BOUGHT JUST DIDN'T LAST AND YOU DIDN'T HAVE MONEY TO GET MORE.: NEVER TRUE

## 2024-10-20 SDOH — SOCIAL STABILITY: SOCIAL INSECURITY
WITHIN THE LAST YEAR, HAVE YOU BEEN RAPED OR FORCED TO HAVE ANY KIND OF SEXUAL ACTIVITY BY YOUR PARTNER OR EX-PARTNER?: NO

## 2024-10-20 SDOH — SOCIAL STABILITY: SOCIAL INSECURITY: HAS ANYONE EVER THREATENED TO HURT YOUR FAMILY OR YOUR PETS?: NO

## 2024-10-20 SDOH — SOCIAL STABILITY: SOCIAL INSECURITY: ABUSE: ADULT

## 2024-10-20 SDOH — SOCIAL STABILITY: SOCIAL INSECURITY: DO YOU FEEL ANYONE HAS EXPLOITED OR TAKEN ADVANTAGE OF YOU FINANCIALLY OR OF YOUR PERSONAL PROPERTY?: NO

## 2024-10-20 SDOH — SOCIAL STABILITY: SOCIAL INSECURITY: HAVE YOU HAD ANY THOUGHTS OF HARMING ANYONE ELSE?: NO

## 2024-10-20 SDOH — SOCIAL STABILITY: SOCIAL INSECURITY: DO YOU FEEL UNSAFE GOING BACK TO THE PLACE WHERE YOU ARE LIVING?: NO

## 2024-10-20 SDOH — ECONOMIC STABILITY: HOUSING INSECURITY: IN THE PAST 12 MONTHS, HOW MANY TIMES HAVE YOU MOVED WHERE YOU WERE LIVING?: 0

## 2024-10-20 SDOH — SOCIAL STABILITY: SOCIAL INSECURITY: DOES ANYONE TRY TO KEEP YOU FROM HAVING/CONTACTING OTHER FRIENDS OR DOING THINGS OUTSIDE YOUR HOME?: NO

## 2024-10-20 SDOH — SOCIAL STABILITY: SOCIAL INSECURITY: ARE YOU OR HAVE YOU BEEN THREATENED OR ABUSED PHYSICALLY, EMOTIONALLY, OR SEXUALLY BY ANYONE?: NO

## 2024-10-20 ASSESSMENT — LIFESTYLE VARIABLES
AUDIT-C TOTAL SCORE: 0
PRESCIPTION_ABUSE_PAST_12_MONTHS: NO
EVER FELT BAD OR GUILTY ABOUT YOUR DRINKING: NO
EVER HAD A DRINK FIRST THING IN THE MORNING TO STEADY YOUR NERVES TO GET RID OF A HANGOVER: NO
TOTAL SCORE: 0
SKIP TO QUESTIONS 9-10: 1
SUBSTANCE_ABUSE_PAST_12_MONTHS: NO
AUDIT-C TOTAL SCORE: 0
HAVE YOU EVER FELT YOU SHOULD CUT DOWN ON YOUR DRINKING: NO
HOW OFTEN DO YOU HAVE A DRINK CONTAINING ALCOHOL: NEVER
HOW MANY STANDARD DRINKS CONTAINING ALCOHOL DO YOU HAVE ON A TYPICAL DAY: PATIENT DOES NOT DRINK
HOW OFTEN DO YOU HAVE 6 OR MORE DRINKS ON ONE OCCASION: NEVER
HAVE PEOPLE ANNOYED YOU BY CRITICIZING YOUR DRINKING: NO

## 2024-10-20 ASSESSMENT — ACTIVITIES OF DAILY LIVING (ADL)
JUDGMENT_ADEQUATE_SAFELY_COMPLETE_DAILY_ACTIVITIES: YES
BATHING: NEEDS ASSISTANCE
HEARING - RIGHT EAR: FUNCTIONAL
GROOMING: NEEDS ASSISTANCE
WALKS IN HOME: NEEDS ASSISTANCE
ADEQUATE_TO_COMPLETE_ADL: YES
FEEDING YOURSELF: INDEPENDENT
HEARING - RIGHT EAR: FUNCTIONAL
PATIENT'S MEMORY ADEQUATE TO SAFELY COMPLETE DAILY ACTIVITIES?: YES
TOILETING: NEEDS ASSISTANCE
BATHING: NEEDS ASSISTANCE
LACK_OF_TRANSPORTATION: PATIENT DECLINED
ADEQUATE_TO_COMPLETE_ADL: YES
HEARING - LEFT EAR: FUNCTIONAL
JUDGMENT_ADEQUATE_SAFELY_COMPLETE_DAILY_ACTIVITIES: YES
ADEQUATE_TO_COMPLETE_ADL: YES
WALKS IN HOME: NEEDS ASSISTANCE
GROOMING: NEEDS ASSISTANCE
HEARING - LEFT EAR: FUNCTIONAL
DRESSING YOURSELF: NEEDS ASSISTANCE
PATIENT'S MEMORY ADEQUATE TO SAFELY COMPLETE DAILY ACTIVITIES?: YES
DRESSING YOURSELF: NEEDS ASSISTANCE
JUDGMENT_ADEQUATE_SAFELY_COMPLETE_DAILY_ACTIVITIES: YES
TOILETING: NEEDS ASSISTANCE
FEEDING YOURSELF: INDEPENDENT
ASSISTIVE_DEVICE: WHEELCHAIR

## 2024-10-20 ASSESSMENT — COGNITIVE AND FUNCTIONAL STATUS - GENERAL
DAILY ACTIVITIY SCORE: 13
MOVING FROM LYING ON BACK TO SITTING ON SIDE OF FLAT BED WITH BEDRAILS: A LITTLE
HELP NEEDED FOR BATHING: A LOT
MOVING FROM LYING ON BACK TO SITTING ON SIDE OF FLAT BED WITH BEDRAILS: A LITTLE
HELP NEEDED FOR BATHING: A LOT
PERSONAL GROOMING: A LOT
MOBILITY SCORE: 9
STANDING UP FROM CHAIR USING ARMS: TOTAL
PATIENT BASELINE BEDBOUND: YES
DRESSING REGULAR UPPER BODY CLOTHING: A LOT
WALKING IN HOSPITAL ROOM: TOTAL
EATING MEALS: A LOT
MOBILITY SCORE: 9
CLIMB 3 TO 5 STEPS WITH RAILING: TOTAL
MOVING TO AND FROM BED TO CHAIR: TOTAL
TURNING FROM BACK TO SIDE WHILE IN FLAT BAD: A LOT
WALKING IN HOSPITAL ROOM: TOTAL
DRESSING REGULAR LOWER BODY CLOTHING: A LOT
TURNING FROM BACK TO SIDE WHILE IN FLAT BAD: A LOT
STANDING UP FROM CHAIR USING ARMS: TOTAL
EATING MEALS: A LITTLE
EATING MEALS: A LITTLE
TURNING FROM BACK TO SIDE WHILE IN FLAT BAD: A LOT
DAILY ACTIVITIY SCORE: 13
CLIMB 3 TO 5 STEPS WITH RAILING: TOTAL
TOILETING: A LOT
CLIMB 3 TO 5 STEPS WITH RAILING: TOTAL
HELP NEEDED FOR BATHING: A LOT
PERSONAL GROOMING: A LOT
PERSONAL GROOMING: A LOT
MOVING TO AND FROM BED TO CHAIR: TOTAL
TOILETING: A LOT
DRESSING REGULAR LOWER BODY CLOTHING: A LOT
WALKING IN HOSPITAL ROOM: TOTAL
TOILETING: A LOT
DRESSING REGULAR UPPER BODY CLOTHING: A LOT
MOBILITY SCORE: 9
MOVING TO AND FROM BED TO CHAIR: TOTAL
DAILY ACTIVITIY SCORE: 12
STANDING UP FROM CHAIR USING ARMS: TOTAL
MOVING FROM LYING ON BACK TO SITTING ON SIDE OF FLAT BED WITH BEDRAILS: A LITTLE
DRESSING REGULAR UPPER BODY CLOTHING: A LOT
DRESSING REGULAR LOWER BODY CLOTHING: A LOT

## 2024-10-20 ASSESSMENT — PAIN SCALES - GENERAL
PAINLEVEL_OUTOF10: 0 - NO PAIN
PAINLEVEL_OUTOF10: 4
PAINLEVEL_OUTOF10: 4

## 2024-10-20 ASSESSMENT — ENCOUNTER SYMPTOMS
SORE THROAT: 0
RHINORRHEA: 0
NAUSEA: 0
ACTIVITY CHANGE: 0
VOMITING: 0
ABDOMINAL DISTENTION: 0
APPETITE CHANGE: 0
PALPITATIONS: 0
HEMATURIA: 1
DIZZINESS: 0
SHORTNESS OF BREATH: 0
COUGH: 0
FEVER: 0
DYSURIA: 0
ABDOMINAL PAIN: 0
HEADACHES: 0
CHILLS: 0
WOUND: 0
DIFFICULTY URINATING: 0

## 2024-10-20 ASSESSMENT — PAIN DESCRIPTION - LOCATION: LOCATION: OTHER (COMMENT)

## 2024-10-20 ASSESSMENT — COLUMBIA-SUICIDE SEVERITY RATING SCALE - C-SSRS
2. HAVE YOU ACTUALLY HAD ANY THOUGHTS OF KILLING YOURSELF?: NO
6. HAVE YOU EVER DONE ANYTHING, STARTED TO DO ANYTHING, OR PREPARED TO DO ANYTHING TO END YOUR LIFE?: NO
1. IN THE PAST MONTH, HAVE YOU WISHED YOU WERE DEAD OR WISHED YOU COULD GO TO SLEEP AND NOT WAKE UP?: NO

## 2024-10-20 ASSESSMENT — PATIENT HEALTH QUESTIONNAIRE - PHQ9
1. LITTLE INTEREST OR PLEASURE IN DOING THINGS: NOT AT ALL
SUM OF ALL RESPONSES TO PHQ9 QUESTIONS 1 & 2: 0
2. FEELING DOWN, DEPRESSED OR HOPELESS: NOT AT ALL

## 2024-10-20 ASSESSMENT — PAIN DESCRIPTION - PAIN TYPE: TYPE: ACUTE PAIN

## 2024-10-20 ASSESSMENT — PAIN - FUNCTIONAL ASSESSMENT
PAIN_FUNCTIONAL_ASSESSMENT: 0-10
PAIN_FUNCTIONAL_ASSESSMENT: 0-10

## 2024-10-20 ASSESSMENT — PAIN DESCRIPTION - DESCRIPTORS: DESCRIPTORS: ACHING

## 2024-10-20 NOTE — ED PROVIDER NOTES
HPI   Chief Complaint   Patient presents with    tube dislodged       Patient presented for evaluation of dislodgment of his left nephrostomy tube.  Patient states that he was attempting to move himself in bed when he accidentally dislodged the nephrostomy tube on the left side.  Patient states he is able to urinate due to function from the right kidney.  Patient states the nephrostomy tube was been in there over a year.  Patient notes that it was placed secondary to kidney stones.  Patient denies any pain or fever at this time      History provided by:  Patient          Patient History   No past medical history on file.  Past Surgical History:   Procedure Laterality Date    IR  NEPHROSTOMY PLACEMENT  11/25/2023    IR  NEPHROSTOMY PLACEMENT 11/25/2023 Omid Desir MD [facility]     No family history on file.  Social History     Tobacco Use    Smoking status: Former     Types: Cigarettes    Smokeless tobacco: Never   Substance Use Topics    Alcohol use: Not Currently    Drug use: Not on file       Physical Exam   ED Triage Vitals   Temperature Heart Rate Respirations BP   10/20/24 0404 10/20/24 0407 10/20/24 0407 10/20/24 0407   36.7 °C (98.1 °F) (!) 110 17 130/75      Pulse Ox Temp src Heart Rate Source Patient Position   10/20/24 0407 -- 10/20/24 0407 10/20/24 0407   96 %  Monitor Lying      BP Location FiO2 (%)     10/20/24 0407 --     Left arm        Physical Exam  Vitals and nursing note reviewed.   HENT:      Head: Atraumatic.      Right Ear: External ear normal.      Left Ear: External ear normal.      Nose: Nose normal.      Mouth/Throat:      Mouth: Mucous membranes are moist.      Pharynx: Oropharynx is clear.   Eyes:      Extraocular Movements: Extraocular movements intact.      Pupils: Pupils are equal, round, and reactive to light.   Neck:      Trachea: Tracheostomy present.     Cardiovascular:      Rate and Rhythm: Normal rate and regular rhythm.      Pulses: Normal pulses.   Pulmonary:       Effort: Pulmonary effort is normal.      Breath sounds: Normal breath sounds.   Abdominal:      Palpations: Abdomen is soft.      Tenderness: There is no abdominal tenderness. There is no right CVA tenderness or left CVA tenderness.      Hernia: A hernia is present. Hernia is present in the ventral area.       Musculoskeletal:         General: No tenderness or signs of injury.      Cervical back: No rigidity or tenderness.      Comments: Contracture to the right upper extremity   Skin:     General: Skin is warm and dry.          Neurological:      General: No focal deficit present.      Mental Status: He is alert and oriented to person, place, and time. Mental status is at baseline.   Psychiatric:         Mood and Affect: Mood is anxious.           ED Course & MDM   ED Course as of 10/20/24 0631   Sun Oct 20, 2024   0559 Upon review of the patient's previous cultures.  Will start meropenem.  Discussed with patient regarding admission for IV antibiotics. [JA]   0603 Discussed with Dr. Rodriguez and he accepts the patient to his service.  [JA]      ED Course User Index  [JA] Jj Chavira DO         Diagnoses as of 10/20/24 0631   Nephrostomy tube displaced (CMS-[facility])   Acute UTI                 No data recorded     Natalie Coma Scale Score: 15 (10/20/24 0422 : Ting Zhong RN)                           Medical Decision Making  Differential diagnosis: UTI, sepsis, renal stone, other      Procedure presenting for evaluation of dislodgment of his left nephrostomy tube.  Patient states he has a left nephrostomy tube for nearly 8 years secondary to an infected kidney stone.  Patient denies any pain at this time.  Patient notes he accidentally remove the tube when moving.  Patient is able to urinate with the urinal.  Patient is initially tachycardic.  Improving in the ED.  Patient is on a trach collar.  For social work patient was found to have a urinary tract infection.  Patient reminded to get infected.  Has  culture showing Morganella and Klebsiella sensitive to meropenem and Zosyn.  Zosyn has a annotation of being dose-dependent thus will treat with meropenem.  Admitted for further evaluation.  Given that the patient can only be treated with IV antibiotics for his urinary tract infection based on cultures likely will need greater than 2 midnights for admission.        Procedure  Procedures     Jj Chavira, DO  10/20/24 0626       Jj Chavira, DO  10/20/24 0631

## 2024-10-20 NOTE — PROGRESS NOTES
10/20/24 1227   Discharge Planning   Living Arrangements Other (Comment)   Support Systems Family members   Assistance Needed adls   Type of Residence Nursing home/residential care   Do you have animals or pets at home? No   Home or Post Acute Services Post acute facilities (Rehab/SNF/etc)   Type of Post Acute Facility Services Long term care   Expected Discharge Disposition Inter   Does the patient need discharge transport arranged? Yes   RoundTrip coordination needed? Yes   Patient Choice   Patient / Family choosing to utilize agency / facility established prior to hospitalization Yes     Chart reviewed, writer spoke with patient- introduced self and explained role. Patient is alert and oriented x3. He was sitting up in bed eating lunch. He confirmed that he was from [facility] for the past 3 years and plan is to return. DSC notified to send referral in [facility]. Care Transition team to follow and assist as needed. BYRON Olson
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
## 2024-10-20 NOTE — CARE PLAN
Problem: Pain - Adult  Goal: Verbalizes/displays adequate comfort level or baseline comfort level  Outcome: Progressing     Problem: Safety - Adult  Goal: Free from fall injury  Outcome: Progressing     Problem: Discharge Planning  Goal: Discharge to home or other facility with appropriate resources  Outcome: Progressing     Problem: Chronic Conditions and Co-morbidities  Goal: Patient's chronic conditions and co-morbidity symptoms are monitored and maintained or improved  Outcome: Progressing   The patient's goals for the shift include      The clinical goals for the shift include to remain safe

## 2024-10-21 ENCOUNTER — APPOINTMENT (OUTPATIENT)
Dept: RADIOLOGY | Facility: HOSPITAL | Age: 54
End: 2024-10-21
Payer: COMMERCIAL

## 2024-10-21 LAB
ANION GAP SERPL CALC-SCNC: 12 MMOL/L (ref 10–20)
BACTERIA UR CULT: ABNORMAL
BUN SERPL-MCNC: 20 MG/DL (ref 6–23)
CALCIUM SERPL-MCNC: 8.9 MG/DL (ref 8.6–10.3)
CHLORIDE SERPL-SCNC: 103 MMOL/L (ref 98–107)
CO2 SERPL-SCNC: 28 MMOL/L (ref 21–32)
CREAT SERPL-MCNC: 0.91 MG/DL (ref 0.5–1.3)
EGFRCR SERPLBLD CKD-EPI 2021: >90 ML/MIN/1.73M*2
ERYTHROCYTE [DISTWIDTH] IN BLOOD BY AUTOMATED COUNT: 19.7 % (ref 11.5–14.5)
GLUCOSE BLD MANUAL STRIP-MCNC: 122 MG/DL (ref 74–99)
GLUCOSE BLD MANUAL STRIP-MCNC: 129 MG/DL (ref 74–99)
GLUCOSE SERPL-MCNC: 122 MG/DL (ref 74–99)
HCT VFR BLD AUTO: 36.9 % (ref 41–52)
HGB BLD-MCNC: 10 G/DL (ref 13.5–17.5)
MCH RBC QN AUTO: 19.2 PG (ref 26–34)
MCHC RBC AUTO-ENTMCNC: 27.1 G/DL (ref 32–36)
MCV RBC AUTO: 71 FL (ref 80–100)
NRBC BLD-RTO: 0 /100 WBCS (ref 0–0)
PLATELET # BLD AUTO: 371 X10*3/UL (ref 150–450)
POTASSIUM SERPL-SCNC: 4.1 MMOL/L (ref 3.5–5.3)
RBC # BLD AUTO: 5.2 X10*6/UL (ref 4.5–5.9)
SODIUM SERPL-SCNC: 139 MMOL/L (ref 136–145)
WBC # BLD AUTO: 9.7 X10*3/UL (ref 4.4–11.3)

## 2024-10-21 PROCEDURE — C1769 GUIDE WIRE: HCPCS

## 2024-10-21 PROCEDURE — 82947 ASSAY GLUCOSE BLOOD QUANT: CPT

## 2024-10-21 PROCEDURE — 2500000002 HC RX 250 W HCPCS SELF ADMINISTERED DRUGS (ALT 637 FOR MEDICARE OP, ALT 636 FOR OP/ED): Performed by: PHYSICIAN ASSISTANT

## 2024-10-21 PROCEDURE — 2780000003 HC OR 278 NO HCPCS

## 2024-10-21 PROCEDURE — C1887 CATHETER, GUIDING: HCPCS

## 2024-10-21 PROCEDURE — 2720000007 HC OR 272 NO HCPCS

## 2024-10-21 PROCEDURE — 99152 MOD SED SAME PHYS/QHP 5/>YRS: CPT

## 2024-10-21 PROCEDURE — 2500000004 HC RX 250 GENERAL PHARMACY W/ HCPCS (ALT 636 FOR OP/ED)

## 2024-10-21 PROCEDURE — 2500000004 HC RX 250 GENERAL PHARMACY W/ HCPCS (ALT 636 FOR OP/ED): Performed by: RADIOLOGY

## 2024-10-21 PROCEDURE — 80048 BASIC METABOLIC PNL TOTAL CA: CPT

## 2024-10-21 PROCEDURE — 1100000001 HC PRIVATE ROOM DAILY

## 2024-10-21 PROCEDURE — 2500000001 HC RX 250 WO HCPCS SELF ADMINISTERED DRUGS (ALT 637 FOR MEDICARE OP): Performed by: PHYSICIAN ASSISTANT

## 2024-10-21 PROCEDURE — 2550000001 HC RX 255 CONTRASTS: Performed by: INTERNAL MEDICINE

## 2024-10-21 PROCEDURE — 0T25X0Z CHANGE DRAINAGE DEVICE IN KIDNEY, EXTERNAL APPROACH: ICD-10-PCS | Performed by: RADIOLOGY

## 2024-10-21 PROCEDURE — C1729 CATH, DRAINAGE: HCPCS

## 2024-10-21 PROCEDURE — 85027 COMPLETE CBC AUTOMATED: CPT

## 2024-10-21 PROCEDURE — 50435 EXCHANGE NEPHROSTOMY CATH: CPT | Performed by: RADIOLOGY

## 2024-10-21 PROCEDURE — 2500000005 HC RX 250 GENERAL PHARMACY W/O HCPCS: Performed by: INTERNAL MEDICINE

## 2024-10-21 PROCEDURE — 36415 COLL VENOUS BLD VENIPUNCTURE: CPT

## 2024-10-21 PROCEDURE — 50432 PLMT NEPHROSTOMY CATHETER: CPT

## 2024-10-21 PROCEDURE — 99152 MOD SED SAME PHYS/QHP 5/>YRS: CPT | Performed by: RADIOLOGY

## 2024-10-21 PROCEDURE — G0378 HOSPITAL OBSERVATION PER HR: HCPCS

## 2024-10-21 RX ORDER — METOPROLOL SUCCINATE 50 MG/1
50 TABLET, EXTENDED RELEASE ORAL DAILY
Status: DISCONTINUED | OUTPATIENT
Start: 2024-10-21 | End: 2024-10-24 | Stop reason: HOSPADM

## 2024-10-21 RX ORDER — BACLOFEN 10 MG/1
15 TABLET ORAL EVERY 6 HOURS PRN
Status: DISCONTINUED | OUTPATIENT
Start: 2024-10-21 | End: 2024-10-24 | Stop reason: HOSPADM

## 2024-10-21 RX ORDER — OXYCODONE AND ACETAMINOPHEN 5; 325 MG/1; MG/1
1 TABLET ORAL EVERY 8 HOURS PRN
Status: DISCONTINUED | OUTPATIENT
Start: 2024-10-21 | End: 2024-10-24 | Stop reason: HOSPADM

## 2024-10-21 RX ORDER — LEVETIRACETAM 500 MG/1
750 TABLET ORAL 2 TIMES DAILY
Status: DISCONTINUED | OUTPATIENT
Start: 2024-10-21 | End: 2024-10-24 | Stop reason: HOSPADM

## 2024-10-21 RX ORDER — FERROUS SULFATE 325(65) MG
65 TABLET ORAL DAILY
Status: DISCONTINUED | OUTPATIENT
Start: 2024-10-21 | End: 2024-10-24 | Stop reason: HOSPADM

## 2024-10-21 RX ORDER — L. ACIDOPHILUS/L.BULGARICUS 1MM CELL
1 TABLET ORAL 2 TIMES DAILY
Status: DISCONTINUED | OUTPATIENT
Start: 2024-10-21 | End: 2024-10-24 | Stop reason: HOSPADM

## 2024-10-21 RX ORDER — CHOLECALCIFEROL (VITAMIN D3) 25 MCG
5000 TABLET ORAL DAILY
Status: DISCONTINUED | OUTPATIENT
Start: 2024-10-21 | End: 2024-10-24 | Stop reason: HOSPADM

## 2024-10-21 RX ORDER — DEXTROSE 50 % IN WATER (D50W) INTRAVENOUS SYRINGE
12.5
Status: DISCONTINUED | OUTPATIENT
Start: 2024-10-21 | End: 2024-10-24 | Stop reason: HOSPADM

## 2024-10-21 RX ORDER — FOLIC ACID 1 MG/1
1 TABLET ORAL DAILY
Status: DISCONTINUED | OUTPATIENT
Start: 2024-10-21 | End: 2024-10-24 | Stop reason: HOSPADM

## 2024-10-21 RX ORDER — GUAIFENESIN/DEXTROMETHORPHAN 100-10MG/5
10 SYRUP ORAL EVERY 4 HOURS PRN
Status: DISCONTINUED | OUTPATIENT
Start: 2024-10-21 | End: 2024-10-24 | Stop reason: HOSPADM

## 2024-10-21 RX ORDER — INSULIN LISPRO 100 [IU]/ML
0-5 INJECTION, SOLUTION INTRAVENOUS; SUBCUTANEOUS
Status: DISCONTINUED | OUTPATIENT
Start: 2024-10-21 | End: 2024-10-24 | Stop reason: HOSPADM

## 2024-10-21 RX ORDER — IPRATROPIUM BROMIDE AND ALBUTEROL SULFATE 2.5; .5 MG/3ML; MG/3ML
3 SOLUTION RESPIRATORY (INHALATION) EVERY 4 HOURS PRN
Status: DISCONTINUED | OUTPATIENT
Start: 2024-10-21 | End: 2024-10-24 | Stop reason: HOSPADM

## 2024-10-21 RX ORDER — PANTOPRAZOLE SODIUM 40 MG/1
40 TABLET, DELAYED RELEASE ORAL
Status: DISCONTINUED | OUTPATIENT
Start: 2024-10-22 | End: 2024-10-24 | Stop reason: HOSPADM

## 2024-10-21 RX ORDER — CETIRIZINE HYDROCHLORIDE 10 MG/1
10 TABLET ORAL DAILY
Status: DISCONTINUED | OUTPATIENT
Start: 2024-10-21 | End: 2024-10-24 | Stop reason: HOSPADM

## 2024-10-21 RX ORDER — ESCITALOPRAM OXALATE 10 MG/1
10 TABLET ORAL
Status: DISCONTINUED | OUTPATIENT
Start: 2024-10-21 | End: 2024-10-24 | Stop reason: HOSPADM

## 2024-10-21 RX ORDER — LANOLIN ALCOHOL/MO/W.PET/CERES
100 CREAM (GRAM) TOPICAL DAILY
Status: DISCONTINUED | OUTPATIENT
Start: 2024-10-21 | End: 2024-10-24 | Stop reason: HOSPADM

## 2024-10-21 RX ORDER — ONDANSETRON HYDROCHLORIDE 2 MG/ML
4 INJECTION, SOLUTION INTRAVENOUS EVERY 6 HOURS PRN
Status: DISCONTINUED | OUTPATIENT
Start: 2024-10-21 | End: 2024-10-24 | Stop reason: HOSPADM

## 2024-10-21 RX ORDER — MIDAZOLAM HYDROCHLORIDE 1 MG/ML
INJECTION INTRAMUSCULAR; INTRAVENOUS
Status: COMPLETED | OUTPATIENT
Start: 2024-10-21 | End: 2024-10-21

## 2024-10-21 RX ORDER — DEXTROSE 50 % IN WATER (D50W) INTRAVENOUS SYRINGE
25
Status: DISCONTINUED | OUTPATIENT
Start: 2024-10-21 | End: 2024-10-24 | Stop reason: HOSPADM

## 2024-10-21 RX ORDER — AMMONIUM LACTATE 12 G/100G
CREAM TOPICAL AS NEEDED
Status: DISCONTINUED | OUTPATIENT
Start: 2024-10-21 | End: 2024-10-24 | Stop reason: HOSPADM

## 2024-10-21 RX ORDER — DOCUSATE SODIUM 100 MG/1
100 CAPSULE, LIQUID FILLED ORAL 2 TIMES DAILY
Status: DISCONTINUED | OUTPATIENT
Start: 2024-10-21 | End: 2024-10-24 | Stop reason: HOSPADM

## 2024-10-21 RX ORDER — LACOSAMIDE 100 MG/1
100 TABLET ORAL EVERY 12 HOURS SCHEDULED
Status: DISCONTINUED | OUTPATIENT
Start: 2024-10-21 | End: 2024-10-24 | Stop reason: HOSPADM

## 2024-10-21 RX ORDER — ONDANSETRON 4 MG/1
4 TABLET, FILM COATED ORAL EVERY 6 HOURS PRN
Status: DISCONTINUED | OUTPATIENT
Start: 2024-10-21 | End: 2024-10-24 | Stop reason: HOSPADM

## 2024-10-21 RX ORDER — LORAZEPAM 0.5 MG/1
1 TABLET ORAL EVERY 6 HOURS PRN
Status: DISCONTINUED | OUTPATIENT
Start: 2024-10-21 | End: 2024-10-24 | Stop reason: HOSPADM

## 2024-10-21 RX ORDER — PETROLATUM 420 MG/G
1 OINTMENT TOPICAL AS NEEDED
Status: DISCONTINUED | OUTPATIENT
Start: 2024-10-21 | End: 2024-10-24 | Stop reason: HOSPADM

## 2024-10-21 RX ORDER — SERTRALINE HYDROCHLORIDE 50 MG/1
50 TABLET, FILM COATED ORAL DAILY
Status: DISCONTINUED | OUTPATIENT
Start: 2024-10-21 | End: 2024-10-24 | Stop reason: HOSPADM

## 2024-10-21 RX ADMIN — APIXABAN 5 MG: 5 TABLET, FILM COATED ORAL at 21:38

## 2024-10-21 RX ADMIN — LEVETIRACETAM 750 MG: 500 TABLET, FILM COATED ORAL at 21:38

## 2024-10-21 RX ADMIN — OXYCODONE HYDROCHLORIDE AND ACETAMINOPHEN 1 TABLET: 5; 325 TABLET ORAL at 17:37

## 2024-10-21 RX ADMIN — FERROUS SULFATE TAB 325 MG (65 MG ELEMENTAL FE) 325 MG: 325 (65 FE) TAB at 15:19

## 2024-10-21 RX ADMIN — APIXABAN 5 MG: 5 TABLET, FILM COATED ORAL at 15:19

## 2024-10-21 RX ADMIN — SERTRALINE HYDROCHLORIDE 50 MG: 50 TABLET ORAL at 15:19

## 2024-10-21 RX ADMIN — LACOSAMIDE 100 MG: 100 TABLET, FILM COATED ORAL at 16:41

## 2024-10-21 RX ADMIN — FOLIC ACID 1 MG: 1 TABLET ORAL at 15:19

## 2024-10-21 RX ADMIN — ESCITALOPRAM OXALATE 10 MG: 10 TABLET ORAL at 15:19

## 2024-10-21 RX ADMIN — METOPROLOL SUCCINATE 50 MG: 50 TABLET, EXTENDED RELEASE ORAL at 15:18

## 2024-10-21 RX ADMIN — IOHEXOL 20 ML: 350 INJECTION, SOLUTION INTRAVENOUS at 16:19

## 2024-10-21 RX ADMIN — CETIRIZINE HYDROCHLORIDE 10 MG: 10 TABLET, FILM COATED ORAL at 15:19

## 2024-10-21 RX ADMIN — MEROPENEM 1 G: 1 INJECTION, POWDER, FOR SOLUTION INTRAVENOUS at 21:54

## 2024-10-21 RX ADMIN — Medication 1 TABLET: at 15:19

## 2024-10-21 RX ADMIN — Medication 5000 UNITS: at 15:18

## 2024-10-21 RX ADMIN — Medication 1 TABLET: at 21:39

## 2024-10-21 RX ADMIN — MIDAZOLAM HYDROCHLORIDE 1 MG: 1 INJECTION, SOLUTION INTRAMUSCULAR; INTRAVENOUS at 16:05

## 2024-10-21 RX ADMIN — Medication 100 MG: at 16:41

## 2024-10-21 RX ADMIN — LEVETIRACETAM 750 MG: 500 TABLET, FILM COATED ORAL at 15:19

## 2024-10-21 RX ADMIN — Medication 40 L/MIN: at 06:43

## 2024-10-21 RX ADMIN — MEROPENEM 1 G: 1 INJECTION, POWDER, FOR SOLUTION INTRAVENOUS at 06:37

## 2024-10-21 RX ADMIN — BACLOFEN 15 MG: 10 TABLET ORAL at 15:19

## 2024-10-21 RX ADMIN — MEROPENEM 1 G: 1 INJECTION, POWDER, FOR SOLUTION INTRAVENOUS at 14:28

## 2024-10-21 ASSESSMENT — PAIN SCALES - GENERAL
PAINLEVEL_OUTOF10: 0 - NO PAIN
PAINLEVEL_OUTOF10: 7
PAINLEVEL_OUTOF10: 4
PAINLEVEL_OUTOF10: 0 - NO PAIN
PAINLEVEL_OUTOF10: 0 - NO PAIN

## 2024-10-21 ASSESSMENT — PAIN - FUNCTIONAL ASSESSMENT
PAIN_FUNCTIONAL_ASSESSMENT: 0-10

## 2024-10-21 ASSESSMENT — PAIN DESCRIPTION - LOCATION: LOCATION: ABDOMEN
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
## 2024-10-21 NOTE — CARE PLAN
The patient's goals for the shift include  comfort and safety    The clinical goals for the shift include comfort and safety during the shift

## 2024-10-21 NOTE — PROCEDURES
Interventional Radiology Brief Postprocedure Note    Attending: Jose Dean MD    Assistant:   Staff Role   Shaina Ray MT Radiology Technologist   Ariana Yoon, RN Radiology Nurse   Tavia Torres MT Radiology Technologist   Anthony Mcneal, SYED Radiology Nurse   Jose Dean MD Radiologist       Diagnosis:   1. Acute UTI        2. Nephrostomy tube displaced (CMS-HCC)            Description of procedure: IR nephrostomy tube exchange left 10 Fr    Timeout:  Yes    Procedure Area: Procedure Area     Anesthesia:   Conscious Sedation    Complications: None    Estimated Blood Loss: minimal    Medications (Filter: Administrations occurring from 1642 to 1642 on 10/21/24) As of 10/21/24 1642      None          No specimens collected      See detailed result report with images in PACS.    The patient tolerated the procedure well without incident or complication and is in stable condition.
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
## 2024-10-21 NOTE — NURSING NOTE
Called Hussain Klein at 10:58 to get his home meds faxed. He does not remember all the medications he takes. Waiting to get them faxed to order his home meds.
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
## 2024-10-21 NOTE — CARE PLAN
Problem: Pain - Adult  Goal: Verbalizes/displays adequate comfort level or baseline comfort level  Outcome: Progressing     Problem: Safety - Adult  Goal: Free from fall injury  Outcome: Progressing     Problem: Discharge Planning  Goal: Discharge to home or other facility with appropriate resources  Outcome: Progressing     Problem: Chronic Conditions and Co-morbidities  Goal: Patient's chronic conditions and co-morbidity symptoms are monitored and maintained or improved  Outcome: Progressing     Problem: Skin  Goal: Prevent/manage excess moisture  Outcome: Progressing  Goal: Prevent/minimize sheer/friction injuries  Outcome: Progressing

## 2024-10-22 ENCOUNTER — APPOINTMENT (OUTPATIENT)
Dept: RADIOLOGY | Facility: HOSPITAL | Age: 54
End: 2024-10-22
Payer: COMMERCIAL

## 2024-10-22 LAB
GLUCOSE BLD MANUAL STRIP-MCNC: 116 MG/DL (ref 74–99)
GLUCOSE BLD MANUAL STRIP-MCNC: 141 MG/DL (ref 74–99)
GLUCOSE BLD MANUAL STRIP-MCNC: 177 MG/DL (ref 74–99)

## 2024-10-22 PROCEDURE — 2500000001 HC RX 250 WO HCPCS SELF ADMINISTERED DRUGS (ALT 637 FOR MEDICARE OP): Performed by: PHYSICIAN ASSISTANT

## 2024-10-22 PROCEDURE — 82947 ASSAY GLUCOSE BLOOD QUANT: CPT

## 2024-10-22 PROCEDURE — 1100000001 HC PRIVATE ROOM DAILY

## 2024-10-22 PROCEDURE — 2500000004 HC RX 250 GENERAL PHARMACY W/ HCPCS (ALT 636 FOR OP/ED)

## 2024-10-22 PROCEDURE — 2500000005 HC RX 250 GENERAL PHARMACY W/O HCPCS: Performed by: INTERNAL MEDICINE

## 2024-10-22 PROCEDURE — 2550000001 HC RX 255 CONTRASTS: Performed by: INTERNAL MEDICINE

## 2024-10-22 PROCEDURE — 74177 CT ABD & PELVIS W/CONTRAST: CPT

## 2024-10-22 PROCEDURE — G0378 HOSPITAL OBSERVATION PER HR: HCPCS

## 2024-10-22 PROCEDURE — 74177 CT ABD & PELVIS W/CONTRAST: CPT | Performed by: RADIOLOGY

## 2024-10-22 PROCEDURE — 2500000002 HC RX 250 W HCPCS SELF ADMINISTERED DRUGS (ALT 637 FOR MEDICARE OP, ALT 636 FOR OP/ED): Performed by: PHYSICIAN ASSISTANT

## 2024-10-22 RX ADMIN — APIXABAN 5 MG: 5 TABLET, FILM COATED ORAL at 20:44

## 2024-10-22 RX ADMIN — MEROPENEM 1 G: 1 INJECTION, POWDER, FOR SOLUTION INTRAVENOUS at 05:50

## 2024-10-22 RX ADMIN — ESCITALOPRAM OXALATE 10 MG: 10 TABLET ORAL at 06:04

## 2024-10-22 RX ADMIN — METOPROLOL SUCCINATE 50 MG: 50 TABLET, EXTENDED RELEASE ORAL at 09:48

## 2024-10-22 RX ADMIN — Medication 1 TABLET: at 20:45

## 2024-10-22 RX ADMIN — CETIRIZINE HYDROCHLORIDE 10 MG: 10 TABLET, FILM COATED ORAL at 09:47

## 2024-10-22 RX ADMIN — DOCUSATE SODIUM 100 MG: 100 CAPSULE, LIQUID FILLED ORAL at 20:44

## 2024-10-22 RX ADMIN — LEVETIRACETAM 750 MG: 500 TABLET, FILM COATED ORAL at 20:45

## 2024-10-22 RX ADMIN — LEVETIRACETAM 750 MG: 500 TABLET, FILM COATED ORAL at 09:47

## 2024-10-22 RX ADMIN — FERROUS SULFATE TAB 325 MG (65 MG ELEMENTAL FE) 325 MG: 325 (65 FE) TAB at 09:46

## 2024-10-22 RX ADMIN — Medication 100 MG: at 09:55

## 2024-10-22 RX ADMIN — IOHEXOL 75 ML: 350 INJECTION, SOLUTION INTRAVENOUS at 18:17

## 2024-10-22 RX ADMIN — MEROPENEM 1 G: 1 INJECTION, POWDER, FOR SOLUTION INTRAVENOUS at 14:40

## 2024-10-22 RX ADMIN — PANTOPRAZOLE SODIUM 40 MG: 40 TABLET, DELAYED RELEASE ORAL at 06:04

## 2024-10-22 RX ADMIN — SERTRALINE HYDROCHLORIDE 50 MG: 50 TABLET ORAL at 09:47

## 2024-10-22 RX ADMIN — LACOSAMIDE 100 MG: 100 TABLET, FILM COATED ORAL at 17:18

## 2024-10-22 RX ADMIN — Medication 5000 UNITS: at 09:47

## 2024-10-22 RX ADMIN — MEROPENEM 1 G: 1 INJECTION, POWDER, FOR SOLUTION INTRAVENOUS at 22:48

## 2024-10-22 RX ADMIN — FOLIC ACID 1 MG: 1 TABLET ORAL at 09:49

## 2024-10-22 RX ADMIN — OXYCODONE HYDROCHLORIDE AND ACETAMINOPHEN 1 TABLET: 5; 325 TABLET ORAL at 20:50

## 2024-10-22 RX ADMIN — APIXABAN 5 MG: 5 TABLET, FILM COATED ORAL at 09:49

## 2024-10-22 RX ADMIN — Medication 1 TABLET: at 09:49

## 2024-10-22 RX ADMIN — LACOSAMIDE 100 MG: 100 TABLET, FILM COATED ORAL at 05:50

## 2024-10-22 RX ADMIN — Medication 40 L/MIN: at 20:55

## 2024-10-22 RX ADMIN — INSULIN LISPRO 1 UNITS: 100 INJECTION, SOLUTION INTRAVENOUS; SUBCUTANEOUS at 12:41

## 2024-10-22 RX ADMIN — Medication 40 L/MIN: at 20:54

## 2024-10-22 ASSESSMENT — PAIN SCALES - GENERAL
PAINLEVEL_OUTOF10: 0 - NO PAIN
PAINLEVEL_OUTOF10: 0 - NO PAIN
PAINLEVEL_OUTOF10: 7
PAINLEVEL_OUTOF10: 0 - NO PAIN

## 2024-10-22 ASSESSMENT — COGNITIVE AND FUNCTIONAL STATUS - GENERAL
MOBILITY SCORE: 9
MOVING TO AND FROM BED TO CHAIR: TOTAL
DRESSING REGULAR UPPER BODY CLOTHING: A LOT
DAILY ACTIVITIY SCORE: 12
EATING MEALS: A LOT
STANDING UP FROM CHAIR USING ARMS: TOTAL
WALKING IN HOSPITAL ROOM: TOTAL
TURNING FROM BACK TO SIDE WHILE IN FLAT BAD: A LOT
HELP NEEDED FOR BATHING: A LOT
MOVING FROM LYING ON BACK TO SITTING ON SIDE OF FLAT BED WITH BEDRAILS: A LITTLE
TOILETING: A LOT
CLIMB 3 TO 5 STEPS WITH RAILING: TOTAL
DRESSING REGULAR LOWER BODY CLOTHING: A LOT
PERSONAL GROOMING: A LOT

## 2024-10-22 ASSESSMENT — PAIN DESCRIPTION - LOCATION: LOCATION: ABDOMEN

## 2024-10-22 NOTE — CARE PLAN
The patient's goals for the shift include  comfort and safety    The clinical goals for the shift include Pt will remain comfortable and safe during the shift

## 2024-10-22 NOTE — PROGRESS NOTES
Dionte Sharpe is a 53 y.o. male on day 2 of admission presenting with Acute UTI.      Subjective   Patient seen and examined at bedside.  Patient feels well.  No fever or chills overnight.       Objective     Last Recorded Vitals  /86 (BP Location: Left arm, Patient Position: Lying)   Pulse 85   Temp 35.8 °C (96.4 °F) (Temporal)   Resp 19   Wt 93 kg (205 lb)   SpO2 97%   Intake/Output last 3 Shifts:    Intake/Output Summary (Last 24 hours) at 10/22/2024 1347  Last data filed at 10/22/2024 1300  Gross per 24 hour   Intake 480 ml   Output 2100 ml   Net -1620 ml       Admission Weight  Weight: 93 kg (205 lb) (10/20/24 0407)    Daily Weight  10/20/24 : 93 kg (205 lb)    Image Results  XR chest 1 view  Narrative: Interpreted By:  Kimi Espinal,   STUDY:  XR CHEST 1 VIEW; 10/20/2024 6:55 am      INDICATION:  Signs/Symptoms:infection      COMPARISON:  12/06/2023.      ACCESSION NUMBER(S):  [id number]      ORDERING CLINICIAN:  JONE RODRIGUEZ      TECHNIQUE:  AP view of chest.      FINDINGS:  Tracheostomy tube in place similar to prior. Mild bibasilar opacity  suggestive atelectasis or scarring similar to prior. Evaluation is  limited to portable technique and positioning. Elevation of right  hemidiaphragm again noted, unchanged. No pneumothorax. The cardiac  silhouette is stable for the technique.      Impression: Mild bibasilar opacity suggestive atelectasis or infiltrate, slightly  worse on the left, without significant interval change since previous  exam. Recommend clinical correlation and follow-up. If there is  clinical concern for acute aortic pathology or pulmonary embolic  disease, further evaluation with chest CT should be considered.      Signed by: Kimi Espinal 10/20/2024 8:48 AM  Dictation workstation:   [id number]      Physical Exam  General: Alert and orientated.  HEENT:  Normocephalic, atraumatic, mucus membranes moist.   Neck:  Trachea midline.  No JVD.    Chest:  Clear to auscultation  bilaterally. No wheezes, rales, or rhonchi.  CV:  Regular rate and rhythm.  Positive S1/S2.   Abdomen: Stoma output.  Bowel sounds present in all four quadrants, abdomen is soft, non-tender, non-distended.  Extremities: Right forearm contraction.  No lower extremity edema or cyanosis.   Neurological:  AAOx3. No focal deficits.  Skin:  Warm and dry.     Relevant Results  All labs and imaging reviewed by myself.    Assessment/Plan   Dionte Sharpe is a 53-year-old male with a significant past medical history of bilateral kidney stones with nephrostomy tubes who presented to hospital with recurrent UTI secondary to kidney stones and hydronephrosis.     # Recurrent UTI secondary to kidney stones and hydronephrosis  - Continue meropenem day 3 for total course of 7 days.  Urine culture was contaminated.  Previous urine cultures grew Morganella Morgagni and Klebsiella.  - Interventional radiology changed nephrostomy tube yesterday successfully.  - Urology advise that patient be referred to [facility] urology for stone removal as they have the appropriate equipment to carry this out.  [facility] or [facility] does not have the appropriate equipment.  We will ensure referral is placed to [facility] urology.  - Continue plan as per primary team.     I reviewed and interpreted all lab test imaging studies and documentations from other healthcare providers      Hiram Swanson MD  PGY 2 infectious disease

## 2024-10-22 NOTE — PROGRESS NOTES
Dionte Sharpe is a 53 y.o. male on day 2 of admission presenting with Acute UTI.      Subjective   Seen today treating him for again these recurrent urinary tract infections he has nephrostomy tubes kidney stones and hydronephrosis in his past again he is not running a fever at this time he does not look toxic to me and I am treating him he is currently on meropenem    His nephrostomy tube was changed successfully yesterday by interventional radiology with continuing through a total of 7-day course on meropenem       Objective     Last Recorded Vitals  /86 (BP Location: Left arm, Patient Position: Lying)   Pulse 85   Temp 35.8 °C (96.4 °F) (Temporal)   Resp 19   Wt 93 kg (205 lb)   SpO2 97%   Intake/Output last 3 Shifts:    Intake/Output Summary (Last 24 hours) at 10/22/2024 1416  Last data filed at 10/22/2024 1300  Gross per 24 hour   Intake 480 ml   Output 2100 ml   Net -1620 ml       Admission Weight  Weight: 93 kg (205 lb) (10/20/24 0407)    Daily Weight  10/20/24 : 93 kg (205 lb)    Image Results  XR chest 1 view  Narrative: Interpreted By:  Kimi Espinal,   STUDY:  XR CHEST 1 VIEW; 10/20/2024 6:55 am      INDICATION:  Signs/Symptoms:infection      COMPARISON:  12/06/2023.      ACCESSION NUMBER(S):  [id number]      ORDERING CLINICIAN:  JONE RODRIGUEZ      TECHNIQUE:  AP view of chest.      FINDINGS:  Tracheostomy tube in place similar to prior. Mild bibasilar opacity  suggestive atelectasis or scarring similar to prior. Evaluation is  limited to portable technique and positioning. Elevation of right  hemidiaphragm again noted, unchanged. No pneumothorax. The cardiac  silhouette is stable for the technique.      Impression: Mild bibasilar opacity suggestive atelectasis or infiltrate, slightly  worse on the left, without significant interval change since previous  exam. Recommend clinical correlation and follow-up. If there is  clinical concern for acute aortic pathology or pulmonary embolic  disease,  further evaluation with chest CT should be considered.      Signed by: Kimi Espinal 10/20/2024 8:48 AM  Dictation workstation:   [id number]    Results from last 7 days   Lab Units 10/21/24  0544   WBC AUTO x10*3/uL 9.7   HEMOGLOBIN g/dL 10.0*   HEMATOCRIT % 36.9*   PLATELETS AUTO x10*3/uL 371      Results from last 7 days   Lab Units 10/21/24  0544 10/20/24  0443   SODIUM mmol/L 139 138   POTASSIUM mmol/L 4.1 4.3   CHLORIDE mmol/L 103 103   CO2 mmol/L 28 27   BUN mg/dL 20 21   CREATININE mg/dL 0.91 0.92   CALCIUM mg/dL 8.9 8.9   PROTEIN TOTAL g/dL  --  7.3   BILIRUBIN TOTAL mg/dL  --  0.3   ALK PHOS U/L  --  69   ALT U/L  --  10   AST U/L  --  22   GLUCOSE mg/dL 122* 96      Review of systems is fever and recurrent urinary tract infections sepsis weakness    Physical Exam  Lungs are diminished but clear heart has regular rate and rhythm abdomen is soft is not distended or firm  Relevant Results               Assessment/Plan                  Assessment & Plan  Acute UTI    Recurrent urinary tract infections he has had these many times over including pneumonia.  He develops usually multidrug-resistant organisms he has grown Klebsiella in the past I Rand look up his urine cultures today and continue with meropenem at this point he received fluids and he is eating orally intake and hydration adequately he is not complaining of any pain or discomfort and I am in to continue to treat his condition      His nephrostomy tube was changed successfully treat about for another 2 or 3 days and probably several days he will need Merrem back at the facility for a total course of 7 he is on day 3 so he will need he will need it through Saturday so he still may need several days there so discontinue with the same present course and therapy thank you              Eddie Rodriguez DO

## 2024-10-22 NOTE — PROGRESS NOTES
Patient is a LTC bedhold at [facility] and may return skilled for IV ABT if needed, if so patient will need precert to return.

## 2024-10-23 LAB
GLUCOSE BLD MANUAL STRIP-MCNC: 103 MG/DL (ref 74–99)
GLUCOSE BLD MANUAL STRIP-MCNC: 162 MG/DL (ref 74–99)
GLUCOSE BLD MANUAL STRIP-MCNC: 168 MG/DL (ref 74–99)
GLUCOSE BLD MANUAL STRIP-MCNC: 217 MG/DL (ref 74–99)

## 2024-10-23 PROCEDURE — G0378 HOSPITAL OBSERVATION PER HR: HCPCS

## 2024-10-23 PROCEDURE — 2500000001 HC RX 250 WO HCPCS SELF ADMINISTERED DRUGS (ALT 637 FOR MEDICARE OP): Performed by: PHYSICIAN ASSISTANT

## 2024-10-23 PROCEDURE — 2500000004 HC RX 250 GENERAL PHARMACY W/ HCPCS (ALT 636 FOR OP/ED)

## 2024-10-23 PROCEDURE — 2500000005 HC RX 250 GENERAL PHARMACY W/O HCPCS: Performed by: INTERNAL MEDICINE

## 2024-10-23 PROCEDURE — 82947 ASSAY GLUCOSE BLOOD QUANT: CPT

## 2024-10-23 PROCEDURE — 2500000002 HC RX 250 W HCPCS SELF ADMINISTERED DRUGS (ALT 637 FOR MEDICARE OP, ALT 636 FOR OP/ED): Performed by: PHYSICIAN ASSISTANT

## 2024-10-23 PROCEDURE — 1100000001 HC PRIVATE ROOM DAILY

## 2024-10-23 RX ORDER — LORATADINE 10 MG/1
10 TABLET ORAL DAILY
COMMUNITY

## 2024-10-23 RX ORDER — HYDROCORTISONE 1 %
1 CREAM (GRAM) TOPICAL 2 TIMES DAILY
COMMUNITY

## 2024-10-23 RX ORDER — METOPROLOL SUCCINATE 25 MG/1
25 TABLET, EXTENDED RELEASE ORAL DAILY
COMMUNITY

## 2024-10-23 RX ORDER — ONDANSETRON 4 MG/1
1 TABLET, FILM COATED ORAL EVERY 8 HOURS PRN
COMMUNITY

## 2024-10-23 RX ORDER — HYDROCORTISONE 1 %
1 CREAM (GRAM) TOPICAL 2 TIMES DAILY
Status: DISCONTINUED | OUTPATIENT
Start: 2024-10-23 | End: 2024-10-24 | Stop reason: HOSPADM

## 2024-10-23 RX ORDER — MEROPENEM 1 G/1
1 INJECTION, POWDER, FOR SOLUTION INTRAVENOUS EVERY 8 HOURS
Qty: 9 G | Refills: 0 | Status: SHIPPED | OUTPATIENT
Start: 2024-10-23 | End: 2024-10-26

## 2024-10-23 RX ADMIN — Medication 1 TABLET: at 08:55

## 2024-10-23 RX ADMIN — MEROPENEM 1 G: 1 INJECTION, POWDER, FOR SOLUTION INTRAVENOUS at 06:26

## 2024-10-23 RX ADMIN — SERTRALINE HYDROCHLORIDE 50 MG: 50 TABLET ORAL at 08:55

## 2024-10-23 RX ADMIN — INSULIN LISPRO 1 UNITS: 100 INJECTION, SOLUTION INTRAVENOUS; SUBCUTANEOUS at 12:45

## 2024-10-23 RX ADMIN — MEROPENEM 1 G: 1 INJECTION, POWDER, FOR SOLUTION INTRAVENOUS at 22:18

## 2024-10-23 RX ADMIN — FOLIC ACID 1 MG: 1 TABLET ORAL at 08:56

## 2024-10-23 RX ADMIN — ESCITALOPRAM OXALATE 10 MG: 10 TABLET ORAL at 08:55

## 2024-10-23 RX ADMIN — METOPROLOL SUCCINATE 50 MG: 50 TABLET, EXTENDED RELEASE ORAL at 08:56

## 2024-10-23 RX ADMIN — APIXABAN 5 MG: 5 TABLET, FILM COATED ORAL at 21:05

## 2024-10-23 RX ADMIN — LACOSAMIDE 100 MG: 100 TABLET, FILM COATED ORAL at 21:03

## 2024-10-23 RX ADMIN — Medication 40 L/MIN: at 07:45

## 2024-10-23 RX ADMIN — INSULIN LISPRO 1 UNITS: 100 INJECTION, SOLUTION INTRAVENOUS; SUBCUTANEOUS at 08:56

## 2024-10-23 RX ADMIN — Medication 5000 UNITS: at 08:55

## 2024-10-23 RX ADMIN — Medication 100 MG: at 08:56

## 2024-10-23 RX ADMIN — LEVETIRACETAM 750 MG: 500 TABLET, FILM COATED ORAL at 08:54

## 2024-10-23 RX ADMIN — HYDROCORTISONE 1 APPLICATION: 1 CREAM TOPICAL at 13:53

## 2024-10-23 RX ADMIN — FERROUS SULFATE TAB 325 MG (65 MG ELEMENTAL FE) 325 MG: 325 (65 FE) TAB at 08:55

## 2024-10-23 RX ADMIN — CETIRIZINE HYDROCHLORIDE 10 MG: 10 TABLET, FILM COATED ORAL at 08:56

## 2024-10-23 RX ADMIN — PANTOPRAZOLE SODIUM 40 MG: 40 TABLET, DELAYED RELEASE ORAL at 06:26

## 2024-10-23 RX ADMIN — APIXABAN 5 MG: 5 TABLET, FILM COATED ORAL at 08:55

## 2024-10-23 RX ADMIN — MEROPENEM 1 G: 1 INJECTION, POWDER, FOR SOLUTION INTRAVENOUS at 14:34

## 2024-10-23 RX ADMIN — LACOSAMIDE 100 MG: 100 TABLET, FILM COATED ORAL at 08:55

## 2024-10-23 RX ADMIN — LEVETIRACETAM 750 MG: 500 TABLET, FILM COATED ORAL at 21:04

## 2024-10-23 RX ADMIN — Medication 40 L/MIN: at 21:57

## 2024-10-23 RX ADMIN — Medication 1 TABLET: at 21:02

## 2024-10-23 ASSESSMENT — PAIN SCALES - GENERAL
PAINLEVEL_OUTOF10: 0 - NO PAIN
PAINLEVEL_OUTOF10: 0 - NO PAIN

## 2024-10-23 ASSESSMENT — COGNITIVE AND FUNCTIONAL STATUS - GENERAL
DRESSING REGULAR LOWER BODY CLOTHING: A LOT
STANDING UP FROM CHAIR USING ARMS: TOTAL
DRESSING REGULAR UPPER BODY CLOTHING: A LOT
MOVING FROM LYING ON BACK TO SITTING ON SIDE OF FLAT BED WITH BEDRAILS: A LITTLE
TOILETING: A LOT
MOVING TO AND FROM BED TO CHAIR: TOTAL
HELP NEEDED FOR BATHING: A LOT
EATING MEALS: A LOT
CLIMB 3 TO 5 STEPS WITH RAILING: TOTAL
TURNING FROM BACK TO SIDE WHILE IN FLAT BAD: A LOT
MOVING FROM LYING ON BACK TO SITTING ON SIDE OF FLAT BED WITH BEDRAILS: A LOT
PERSONAL GROOMING: A LOT
DAILY ACTIVITIY SCORE: 12

## 2024-10-23 NOTE — CARE PLAN
The patient's goals for the shift include  comfort     The clinical goals for the shift include comfort and safety

## 2024-10-23 NOTE — PROGRESS NOTES
Dionte Sharpe is a 53 y.o. male on day 3 of admission presenting with Acute UTI.      Subjective   Seen today cultures are not growing any specific he is on IV Merrem through Saturday we are to start the discharge process probably back to [place] Friday so we will just need 1 day of IVs and specifically nothing growing       Objective     Last Recorded Vitals  /67 (BP Location: Left arm, Patient Position: Lying)   Pulse 77   Temp 36.5 °C (97.7 °F) (Temporal)   Resp 20   Wt 93 kg (205 lb)   SpO2 94%   Intake/Output last 3 Shifts:    Intake/Output Summary (Last 24 hours) at 10/23/2024 1208  Last data filed at 10/23/2024 0408  Gross per 24 hour   Intake 600 ml   Output 1880 ml   Net -1280 ml       Admission Weight  Weight: 93 kg (205 lb) (10/20/24 0407)    Daily Weight  10/20/24 : 93 kg (205 lb)    Image Results  CT abdomen pelvis w IV contrast  Narrative: Interpreted By:  Soy Diaz,   STUDY:  CT ABDOMEN PELVIS W IV CONTRAST;  10/22/2024 6:16 pm      INDICATION:  Signs/Symptoms:Assess for pyelo, kidney stones.      COMPARISON:  11/24/2023      ACCESSION NUMBER(S):  [id number]      ORDERING CLINICIAN:  AMEENA GRANGER      TECHNIQUE:  CT of the abdomen and pelvis was performed. Contiguous axial images  were obtained at 3 mm slice thickness through the abdomen and pelvis.  Coronal and sagittal reconstructions at 3 mm slice thickness were  performed.  Omnipaque 350 was injected intravenously.      FINDINGS:  LOWER CHEST:  Bibasilar discoid atelectasis and/or fibrosis greater on the right,  similar to the prior exam.      ABDOMEN:      LIVER:  The hepatic parenchyma is homogeneous without evidence of focal liver  lesions.The hepatic size is normal.      SPLEEN:  The spleen is normal in size and homogeneous.      ADRENAL GLANDS:  There is a nodule in the left adrenal gland measuring 1.7 cm which is  incompletely characterized on this examination, similar to the prior  exam. The right adrenal gland is  unremarkable.      KIDNEYS AND URETERS:  There are multiple bilateral intrarenal calculi, the largest at the  left midpole measuring 1.7 cm. Overall these appear fairly similar to  the previous exam. There are several cortical cysts as previously.      A proximal left ureteral calculus on the previous examination has  resolved. A left nephrostomy tube has been placed since the prior  exam and appears in satisfactory position, and with improvement of  pelvocaliectasis. An approximate 2.1 cm area of opacity at the left  renal sinus anteriorly is probably an extension of the renal pelvis  with some associated reticulation from edema or perhaps hemorrhage  from nephrostomy tube placement. However, if there is persistent  suspicion of pyelonephritis and infection, follow-up would be  recommended to exclude an early forming abscess. The collecting  systems otherwise are unremarkable without additional radiodense  urinary tract calculi.          PANCREAS:  The pancreas appears unremarkable, there is no ductal dilatation or  masses.      GALLBLADDER:  No radiodense calculi, wall thickening or pericholecystic fluid.      BILE DUCTS:  There is no biliary dilatation or filling defects.          VESSELS:  The aorta and IVC are within normal limits.      PERITONEUM AND RETROPERITONEUM:  No ascites or free air, no fluid collection.      The retroperitoneum appears unremarkable, and without significant  adenopathy.      No enlarged mesenteric lymph nodes.      BOWEL:  A fecal bolus is present within a mildly distended rectosigmoid colon  measuring 6.7 cm in diameter. There is mild sigmoid diverticulosis.  There again is a diverting colostomy in the right lower quadrant with  partial right. There is chronic 5 cm distension of an approximate 7  cm segment of the proximal remaining post colectomy:, probably  secondary to denervation. There is mild-to-moderate distention of a  debris-filled stomach. The bowel otherwise is  unremarkable.      PELVIS:      BLADDER:  An approximate 1.9 cm calculus is similar to the prior exam. There is  a small donavon of intraluminal air presumably from recent  catheterization or instrumentation. The underdistended bladder  otherwise is grossly unremarkable.      REPRODUCTIVE ORGANS:  No pelvic masses.          BONE, ABDOMINAL WALL AND OTHER FINDINGS:  No suspicious osseous lesions are identified.  Bowel, similar to the previous exam.  There is diastasis recti measuring approximately 16 cm with  nonincarcerated anterior protrusion of      Impression: 1.  Nephrolithiasis similar to the previous exam, with interval  placement of a left nephrostomy tube. There has been improvement of  left pelvocaliectasis, and resolution of a proximal left ureteral  calculus. Appears to be some reticulation at the left renal sinus  anteriorly as described, follow-up as clinically warranted.  2. Additional nonacute stable findings as described.      MACRO:  1. None      Signed by: Soy Diaz 10/23/2024 10:49 AM  Dictation workstation:   [id number]    Results from last 7 days   Lab Units 10/21/24  0544   WBC AUTO x10*3/uL 9.7   HEMOGLOBIN g/dL 10.0*   HEMATOCRIT % 36.9*   PLATELETS AUTO x10*3/uL 371      Results from last 7 days   Lab Units 10/21/24  0544   SODIUM mmol/L 139   POTASSIUM mmol/L 4.1   CHLORIDE mmol/L 103   CO2 mmol/L 28   BUN mg/dL 20   CREATININE mg/dL 0.91   GLUCOSE mg/dL 122*   CALCIUM mg/dL 8.9      In the review of systems weakness no shortness of breath no chest pain    Physical Exam  Lungs are diminished but clear heart has a regular rate and rhythm abdomen is soft is not distended or firm  Relevant Results               Assessment/Plan                  Assessment & Plan  Acute UTI    So far treat the urinary tract infection and IV Merrem through Saturday I am going to start the discharge process and I think he can go back tomorrow or Friday he will need 1 to 2 days of IV Merrem and then we will  reevaluate afterwards I have reviewed his CBC and his electrolytes continue all other present care and therapy and treatment thank you              Eddie Rodriguez DO

## 2024-10-23 NOTE — CARE PLAN
Problem: Pain - Adult  Goal: Verbalizes/displays adequate comfort level or baseline comfort level  Outcome: Progressing     Problem: Safety - Adult  Goal: Free from fall injury  Outcome: Progressing     Problem: Discharge Planning  Goal: Discharge to home or other facility with appropriate resources  Outcome: Progressing     Problem: Chronic Conditions and Co-morbidities  Goal: Patient's chronic conditions and co-morbidity symptoms are monitored and maintained or improved  Outcome: Progressing     Problem: Skin  Goal: Prevent/manage excess moisture  Outcome: Progressing  Flowsheets (Taken 10/23/2024 7840)  Prevent/manage excess moisture: Moisturize dry skin  Goal: Prevent/minimize sheer/friction injuries  Outcome: Progressing  Flowsheets (Taken 10/23/2024 1648)  Prevent/minimize sheer/friction injuries: Turn/reposition every 2 hours/use positioning/transfer devices     Problem: Pain  Goal: Takes deep breaths with improved pain control throughout the shift  Outcome: Progressing  Goal: Turns in bed with improved pain control throughout the shift  Outcome: Progressing  Goal: Walks with improved pain control throughout the shift  Outcome: Progressing  Goal: Performs ADL's with improved pain control throughout shift  Outcome: Progressing  Goal: Participates in PT with improved pain control throughout the shift  Outcome: Progressing  Goal: Free from opioid side effects throughout the shift  Outcome: Progressing  Goal: Free from acute confusion related to pain meds throughout the shift  Outcome: Progressing   The patient's goals for the shift include      The clinical goals for the shift include patient comfort

## 2024-10-24 VITALS
HEART RATE: 93 BPM | SYSTOLIC BLOOD PRESSURE: 129 MMHG | OXYGEN SATURATION: 95 % | RESPIRATION RATE: 18 BRPM | DIASTOLIC BLOOD PRESSURE: 71 MMHG | TEMPERATURE: 98.2 F | WEIGHT: 205 LBS | HEIGHT: 71 IN | BODY MASS INDEX: 28.7 KG/M2

## 2024-10-24 LAB
BACTERIA BLD CULT: NORMAL
BACTERIA BLD CULT: NORMAL
GLUCOSE BLD MANUAL STRIP-MCNC: 112 MG/DL (ref 74–99)
GLUCOSE BLD MANUAL STRIP-MCNC: 125 MG/DL (ref 74–99)
GLUCOSE BLD MANUAL STRIP-MCNC: 156 MG/DL (ref 74–99)

## 2024-10-24 PROCEDURE — 2500000004 HC RX 250 GENERAL PHARMACY W/ HCPCS (ALT 636 FOR OP/ED)

## 2024-10-24 PROCEDURE — 2500000001 HC RX 250 WO HCPCS SELF ADMINISTERED DRUGS (ALT 637 FOR MEDICARE OP): Performed by: PHYSICIAN ASSISTANT

## 2024-10-24 PROCEDURE — G0378 HOSPITAL OBSERVATION PER HR: HCPCS

## 2024-10-24 PROCEDURE — 2500000002 HC RX 250 W HCPCS SELF ADMINISTERED DRUGS (ALT 637 FOR MEDICARE OP, ALT 636 FOR OP/ED): Performed by: PHYSICIAN ASSISTANT

## 2024-10-24 PROCEDURE — 2500000005 HC RX 250 GENERAL PHARMACY W/O HCPCS: Performed by: INTERNAL MEDICINE

## 2024-10-24 PROCEDURE — 82947 ASSAY GLUCOSE BLOOD QUANT: CPT

## 2024-10-24 RX ADMIN — MEROPENEM 1 G: 1 INJECTION, POWDER, FOR SOLUTION INTRAVENOUS at 15:24

## 2024-10-24 RX ADMIN — APIXABAN 5 MG: 5 TABLET, FILM COATED ORAL at 08:48

## 2024-10-24 RX ADMIN — HYDROCORTISONE 1 APPLICATION: 1 CREAM TOPICAL at 08:46

## 2024-10-24 RX ADMIN — ESCITALOPRAM OXALATE 10 MG: 10 TABLET ORAL at 08:47

## 2024-10-24 RX ADMIN — PANTOPRAZOLE SODIUM 40 MG: 40 TABLET, DELAYED RELEASE ORAL at 06:21

## 2024-10-24 RX ADMIN — Medication 100 MG: at 08:47

## 2024-10-24 RX ADMIN — LEVETIRACETAM 750 MG: 500 TABLET, FILM COATED ORAL at 08:47

## 2024-10-24 RX ADMIN — Medication 5000 UNITS: at 08:48

## 2024-10-24 RX ADMIN — LACOSAMIDE 100 MG: 100 TABLET, FILM COATED ORAL at 08:47

## 2024-10-24 RX ADMIN — FOLIC ACID 1 MG: 1 TABLET ORAL at 08:48

## 2024-10-24 RX ADMIN — MEROPENEM 1 G: 1 INJECTION, POWDER, FOR SOLUTION INTRAVENOUS at 06:21

## 2024-10-24 RX ADMIN — INSULIN LISPRO 1 UNITS: 100 INJECTION, SOLUTION INTRAVENOUS; SUBCUTANEOUS at 08:53

## 2024-10-24 RX ADMIN — FERROUS SULFATE TAB 325 MG (65 MG ELEMENTAL FE) 325 MG: 325 (65 FE) TAB at 08:47

## 2024-10-24 RX ADMIN — Medication 1 TABLET: at 08:48

## 2024-10-24 RX ADMIN — SERTRALINE HYDROCHLORIDE 50 MG: 50 TABLET ORAL at 08:47

## 2024-10-24 RX ADMIN — METOPROLOL SUCCINATE 50 MG: 50 TABLET, EXTENDED RELEASE ORAL at 08:47

## 2024-10-24 RX ADMIN — Medication 40 L/MIN: at 08:54

## 2024-10-24 RX ADMIN — CETIRIZINE HYDROCHLORIDE 10 MG: 10 TABLET, FILM COATED ORAL at 08:47

## 2024-10-24 ASSESSMENT — PAIN SCALES - GENERAL: PAINLEVEL_OUTOF10: 0 - NO PAIN

## 2024-10-24 NOTE — CARE PLAN
The patient's goals for the shift include  comfort    The clinical goals for the shift include patient comfort
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
## 2024-10-24 NOTE — CARE PLAN
Problem: Pain - Adult  Goal: Verbalizes/displays adequate comfort level or baseline comfort level  Outcome: Progressing     Problem: Safety - Adult  Goal: Free from fall injury  Outcome: Progressing     Problem: Discharge Planning  Goal: Discharge to home or other facility with appropriate resources  Outcome: Progressing     Problem: Chronic Conditions and Co-morbidities  Goal: Patient's chronic conditions and co-morbidity symptoms are monitored and maintained or improved  Outcome: Progressing     Problem: Skin  Goal: Prevent/manage excess moisture  Outcome: Progressing  Flowsheets (Taken 10/24/2024 1616)  Prevent/manage excess moisture: Moisturize dry skin  Goal: Prevent/minimize sheer/friction injuries  Outcome: Progressing  Flowsheets (Taken 10/24/2024 1618)  Prevent/minimize sheer/friction injuries: Turn/reposition every 2 hours/use positioning/transfer devices     Problem: Pain  Goal: Takes deep breaths with improved pain control throughout the shift  Outcome: Progressing  Goal: Turns in bed with improved pain control throughout the shift  Outcome: Progressing  Goal: Walks with improved pain control throughout the shift  Outcome: Progressing  Goal: Performs ADL's with improved pain control throughout shift  Outcome: Progressing  Goal: Participates in PT with improved pain control throughout the shift  Outcome: Progressing  Goal: Free from opioid side effects throughout the shift  Outcome: Progressing  Goal: Free from acute confusion related to pain meds throughout the shift  Outcome: Progressing   The patient's goals for the shift include      The clinical goals for the shift include maintain patient comfort

## 2024-10-24 NOTE — PROGRESS NOTES
Patient has written discharge order. Patient obtained precert for [facility]. Transport requested. Nurse and patient aware.

## 2024-10-24 NOTE — CARE PLAN
I accompanied the attending physician, Dr. Rodriguez, during rounding today. He has asked me to assist with expediting the patients discharge from the hospital and has verbally given me the discharge order from the hospital. I have been uninvolved in the patient's care up to this point. The attending requests the pt continue their home medications without modification and to send the patient on IV Merrem through Saturday as infectious disease specialist has recommended. The pt will return to his nursing facility where Dr. Rodriguez will continue to see him.

## 2024-12-11 ENCOUNTER — HOSPITAL ENCOUNTER (INPATIENT)
Facility: HOSPITAL | Age: 54
End: 2024-12-11
Attending: EMERGENCY MEDICINE | Admitting: INTERNAL MEDICINE
Payer: COMMERCIAL

## 2024-12-11 DIAGNOSIS — N39.0 ACUTE UTI: Primary | ICD-10-CM

## 2024-12-11 DIAGNOSIS — A41.9 SEPSIS, DUE TO UNSPECIFIED ORGANISM, UNSPECIFIED WHETHER ACUTE ORGAN DYSFUNCTION PRESENT (MULTI): ICD-10-CM

## 2024-12-11 PROCEDURE — 99285 EMERGENCY DEPT VISIT HI MDM: CPT | Mod: 25 | Performed by: EMERGENCY MEDICINE

## 2024-12-11 PROCEDURE — 96366 THER/PROPH/DIAG IV INF ADDON: CPT

## 2024-12-11 PROCEDURE — 96367 TX/PROPH/DG ADDL SEQ IV INF: CPT

## 2024-12-12 ENCOUNTER — APPOINTMENT (OUTPATIENT)
Dept: CARDIOLOGY | Facility: HOSPITAL | Age: 54
End: 2024-12-12
Payer: COMMERCIAL

## 2024-12-12 ENCOUNTER — APPOINTMENT (OUTPATIENT)
Dept: RADIOLOGY | Facility: HOSPITAL | Age: 54
End: 2024-12-12
Payer: COMMERCIAL

## 2024-12-12 PROBLEM — A41.9 SEPSIS, DUE TO UNSPECIFIED ORGANISM, UNSPECIFIED WHETHER ACUTE ORGAN DYSFUNCTION PRESENT (MULTI): Status: ACTIVE | Noted: 2024-12-12

## 2024-12-12 LAB
ABO GROUP (TYPE) IN BLOOD: NORMAL
ALBUMIN SERPL BCP-MCNC: 4.1 G/DL (ref 3.4–5)
ALP SERPL-CCNC: 79 U/L (ref 33–120)
ALT SERPL W P-5'-P-CCNC: 13 U/L (ref 10–52)
ANION GAP BLDV CALCULATED.4IONS-SCNC: 11 MMOL/L (ref 10–25)
ANION GAP SERPL CALC-SCNC: 10 MMOL/L (ref 10–20)
ANION GAP SERPL CALC-SCNC: 13 MMOL/L (ref 10–20)
ANTIBODY SCREEN: NORMAL
APPEARANCE UR: ABNORMAL
AST SERPL W P-5'-P-CCNC: 15 U/L (ref 9–39)
BACTERIA #/AREA URNS AUTO: ABNORMAL /HPF
BASE EXCESS BLDV CALC-SCNC: 4.4 MMOL/L (ref -2–3)
BASOPHILS # BLD AUTO: 0.09 X10*3/UL (ref 0–0.1)
BASOPHILS NFR BLD AUTO: 0.3 %
BILIRUB SERPL-MCNC: 0.4 MG/DL (ref 0–1.2)
BILIRUB UR STRIP.AUTO-MCNC: NEGATIVE MG/DL
BNP SERPL-MCNC: 11 PG/ML (ref 0–99)
BODY TEMPERATURE: 37 DEGREES CELSIUS
BUN SERPL-MCNC: 18 MG/DL (ref 6–23)
BUN SERPL-MCNC: 21 MG/DL (ref 6–23)
CA-I BLDV-SCNC: 1.19 MMOL/L (ref 1.1–1.33)
CALCIUM SERPL-MCNC: 8.7 MG/DL (ref 8.6–10.3)
CALCIUM SERPL-MCNC: 9.3 MG/DL (ref 8.6–10.3)
CARDIAC TROPONIN I PNL SERPL HS: 10 NG/L (ref 0–20)
CARDIAC TROPONIN I PNL SERPL HS: 9 NG/L (ref 0–20)
CHLORIDE BLDV-SCNC: 98 MMOL/L (ref 98–107)
CHLORIDE SERPL-SCNC: 102 MMOL/L (ref 98–107)
CHLORIDE SERPL-SCNC: 99 MMOL/L (ref 98–107)
CO2 SERPL-SCNC: 28 MMOL/L (ref 21–32)
CO2 SERPL-SCNC: 28 MMOL/L (ref 21–32)
COLOR UR: ABNORMAL
CREAT SERPL-MCNC: 1.05 MG/DL (ref 0.5–1.3)
CREAT SERPL-MCNC: 1.1 MG/DL (ref 0.5–1.3)
EGFRCR SERPLBLD CKD-EPI 2021: 80 ML/MIN/1.73M*2
EGFRCR SERPLBLD CKD-EPI 2021: 85 ML/MIN/1.73M*2
EOSINOPHIL # BLD AUTO: 0.28 X10*3/UL (ref 0–0.7)
EOSINOPHIL NFR BLD AUTO: 1.1 %
ERYTHROCYTE [DISTWIDTH] IN BLOOD BY AUTOMATED COUNT: 19.9 % (ref 11.5–14.5)
ERYTHROCYTE [DISTWIDTH] IN BLOOD BY AUTOMATED COUNT: 20 % (ref 11.5–14.5)
FLUAV RNA RESP QL NAA+PROBE: NOT DETECTED
FLUBV RNA RESP QL NAA+PROBE: NOT DETECTED
GLUCOSE BLD MANUAL STRIP-MCNC: 130 MG/DL (ref 74–99)
GLUCOSE BLDV-MCNC: 187 MG/DL (ref 74–99)
GLUCOSE SERPL-MCNC: 132 MG/DL (ref 74–99)
GLUCOSE SERPL-MCNC: 177 MG/DL (ref 74–99)
GLUCOSE UR STRIP.AUTO-MCNC: NORMAL MG/DL
HCO3 BLDV-SCNC: 29.8 MMOL/L (ref 22–26)
HCT VFR BLD AUTO: 37 % (ref 41–52)
HCT VFR BLD AUTO: 38.4 % (ref 41–52)
HCT VFR BLD EST: 32 % (ref 41–52)
HGB BLD-MCNC: 10.2 G/DL (ref 13.5–17.5)
HGB BLD-MCNC: 9.8 G/DL (ref 13.5–17.5)
HGB BLDV-MCNC: 10.7 G/DL (ref 13.5–17.5)
HOLD SPECIMEN: NORMAL
IMM GRANULOCYTES # BLD AUTO: 0.19 X10*3/UL (ref 0–0.7)
IMM GRANULOCYTES NFR BLD AUTO: 0.7 % (ref 0–0.9)
INHALED O2 CONCENTRATION: 44 %
KETONES UR STRIP.AUTO-MCNC: NEGATIVE MG/DL
LACTATE BLDV-SCNC: 2.4 MMOL/L (ref 0.4–2)
LACTATE SERPL-SCNC: 1.2 MMOL/L (ref 0.4–2)
LACTATE SERPL-SCNC: 2.1 MMOL/L (ref 0.4–2)
LEUKOCYTE ESTERASE UR QL STRIP.AUTO: ABNORMAL
LIPASE SERPL-CCNC: 41 U/L (ref 9–82)
LYMPHOCYTES # BLD AUTO: 1.16 X10*3/UL (ref 1.2–4.8)
LYMPHOCYTES NFR BLD AUTO: 4.4 %
MAGNESIUM SERPL-MCNC: 1.37 MG/DL (ref 1.6–2.4)
MCH RBC QN AUTO: 18.8 PG (ref 26–34)
MCH RBC QN AUTO: 19 PG (ref 26–34)
MCHC RBC AUTO-ENTMCNC: 26.5 G/DL (ref 32–36)
MCHC RBC AUTO-ENTMCNC: 26.6 G/DL (ref 32–36)
MCV RBC AUTO: 71 FL (ref 80–100)
MCV RBC AUTO: 72 FL (ref 80–100)
MONOCYTES # BLD AUTO: 2.23 X10*3/UL (ref 0.1–1)
MONOCYTES NFR BLD AUTO: 8.4 %
NEUTROPHILS # BLD AUTO: 22.52 X10*3/UL (ref 1.2–7.7)
NEUTROPHILS NFR BLD AUTO: 85.1 %
NITRITE UR QL STRIP.AUTO: ABNORMAL
NRBC BLD-RTO: 0 /100 WBCS (ref 0–0)
NRBC BLD-RTO: 0 /100 WBCS (ref 0–0)
OXYHGB MFR BLDV: 69.1 % (ref 45–75)
PCO2 BLDV: 47 MM HG (ref 41–51)
PH BLDV: 7.41 PH (ref 7.33–7.43)
PH UR STRIP.AUTO: 7.5 [PH]
PLATELET # BLD AUTO: 363 X10*3/UL (ref 150–450)
PLATELET # BLD AUTO: 376 X10*3/UL (ref 150–450)
PO2 BLDV: 44 MM HG (ref 35–45)
POTASSIUM BLDV-SCNC: 4 MMOL/L (ref 3.5–5.3)
POTASSIUM SERPL-SCNC: 4 MMOL/L (ref 3.5–5.3)
POTASSIUM SERPL-SCNC: 4.2 MMOL/L (ref 3.5–5.3)
PROT SERPL-MCNC: 7.8 G/DL (ref 6.4–8.2)
PROT UR STRIP.AUTO-MCNC: ABNORMAL MG/DL
RBC # BLD AUTO: 5.15 X10*6/UL (ref 4.5–5.9)
RBC # BLD AUTO: 5.44 X10*6/UL (ref 4.5–5.9)
RBC # UR STRIP.AUTO: ABNORMAL /UL
RBC #/AREA URNS AUTO: >20 /HPF
RH FACTOR (ANTIGEN D): NORMAL
RSV RNA RESP QL NAA+PROBE: NOT DETECTED
SAO2 % BLDV: 71 % (ref 45–75)
SARS-COV-2 RNA RESP QL NAA+PROBE: NOT DETECTED
SODIUM BLDV-SCNC: 135 MMOL/L (ref 136–145)
SODIUM SERPL-SCNC: 136 MMOL/L (ref 136–145)
SODIUM SERPL-SCNC: 136 MMOL/L (ref 136–145)
SP GR UR STRIP.AUTO: 1.03
UROBILINOGEN UR STRIP.AUTO-MCNC: NORMAL MG/DL
WBC # BLD AUTO: 26.5 X10*3/UL (ref 4.4–11.3)
WBC # BLD AUTO: 28 X10*3/UL (ref 4.4–11.3)
WBC #/AREA URNS AUTO: >50 /HPF
WBC CLUMPS #/AREA URNS AUTO: ABNORMAL /HPF

## 2024-12-12 PROCEDURE — 96365 THER/PROPH/DIAG IV INF INIT: CPT | Mod: 59

## 2024-12-12 PROCEDURE — 1200000002 HC GENERAL ROOM WITH TELEMETRY DAILY

## 2024-12-12 PROCEDURE — 83690 ASSAY OF LIPASE: CPT | Performed by: EMERGENCY MEDICINE

## 2024-12-12 PROCEDURE — 0T25X0Z CHANGE DRAINAGE DEVICE IN KIDNEY, EXTERNAL APPROACH: ICD-10-PCS | Performed by: RADIOLOGY

## 2024-12-12 PROCEDURE — 93005 ELECTROCARDIOGRAM TRACING: CPT

## 2024-12-12 PROCEDURE — 70450 CT HEAD/BRAIN W/O DYE: CPT | Performed by: RADIOLOGY

## 2024-12-12 PROCEDURE — 82947 ASSAY GLUCOSE BLOOD QUANT: CPT

## 2024-12-12 PROCEDURE — 87636 SARSCOV2 & INF A&B AMP PRB: CPT | Performed by: EMERGENCY MEDICINE

## 2024-12-12 PROCEDURE — 82435 ASSAY OF BLOOD CHLORIDE: CPT | Performed by: EMERGENCY MEDICINE

## 2024-12-12 PROCEDURE — 87040 BLOOD CULTURE FOR BACTERIA: CPT | Mod: PARLAB | Performed by: EMERGENCY MEDICINE

## 2024-12-12 PROCEDURE — 85027 COMPLETE CBC AUTOMATED: CPT | Performed by: NURSE PRACTITIONER

## 2024-12-12 PROCEDURE — 2550000001 HC RX 255 CONTRASTS: Performed by: EMERGENCY MEDICINE

## 2024-12-12 PROCEDURE — C1729 CATH, DRAINAGE: HCPCS

## 2024-12-12 PROCEDURE — C1769 GUIDE WIRE: HCPCS

## 2024-12-12 PROCEDURE — 83880 ASSAY OF NATRIURETIC PEPTIDE: CPT | Performed by: EMERGENCY MEDICINE

## 2024-12-12 PROCEDURE — 71045 X-RAY EXAM CHEST 1 VIEW: CPT

## 2024-12-12 PROCEDURE — 36415 COLL VENOUS BLD VENIPUNCTURE: CPT | Performed by: EMERGENCY MEDICINE

## 2024-12-12 PROCEDURE — 2500000002 HC RX 250 W HCPCS SELF ADMINISTERED DRUGS (ALT 637 FOR MEDICARE OP, ALT 636 FOR OP/ED)

## 2024-12-12 PROCEDURE — 84484 ASSAY OF TROPONIN QUANT: CPT | Performed by: EMERGENCY MEDICINE

## 2024-12-12 PROCEDURE — 50435 EXCHANGE NEPHROSTOMY CATH: CPT | Mod: LT

## 2024-12-12 PROCEDURE — 99223 1ST HOSP IP/OBS HIGH 75: CPT | Performed by: NURSE PRACTITIONER

## 2024-12-12 PROCEDURE — 2500000004 HC RX 250 GENERAL PHARMACY W/ HCPCS (ALT 636 FOR OP/ED): Performed by: NURSE PRACTITIONER

## 2024-12-12 PROCEDURE — 70450 CT HEAD/BRAIN W/O DYE: CPT

## 2024-12-12 PROCEDURE — 87634 RSV DNA/RNA AMP PROBE: CPT | Performed by: NURSE PRACTITIONER

## 2024-12-12 PROCEDURE — 2550000001 HC RX 255 CONTRASTS: Performed by: INTERNAL MEDICINE

## 2024-12-12 PROCEDURE — 2720000007 HC OR 272 NO HCPCS

## 2024-12-12 PROCEDURE — 81001 URINALYSIS AUTO W/SCOPE: CPT | Performed by: EMERGENCY MEDICINE

## 2024-12-12 PROCEDURE — 87086 URINE CULTURE/COLONY COUNT: CPT | Mod: PARLAB | Performed by: EMERGENCY MEDICINE

## 2024-12-12 PROCEDURE — 83605 ASSAY OF LACTIC ACID: CPT | Performed by: EMERGENCY MEDICINE

## 2024-12-12 PROCEDURE — 74177 CT ABD & PELVIS W/CONTRAST: CPT

## 2024-12-12 PROCEDURE — 2500000001 HC RX 250 WO HCPCS SELF ADMINISTERED DRUGS (ALT 637 FOR MEDICARE OP)

## 2024-12-12 PROCEDURE — 96361 HYDRATE IV INFUSION ADD-ON: CPT

## 2024-12-12 PROCEDURE — 83735 ASSAY OF MAGNESIUM: CPT | Performed by: EMERGENCY MEDICINE

## 2024-12-12 PROCEDURE — 2500000005 HC RX 250 GENERAL PHARMACY W/O HCPCS: Performed by: EMERGENCY MEDICINE

## 2024-12-12 PROCEDURE — 85025 COMPLETE CBC W/AUTO DIFF WBC: CPT | Performed by: EMERGENCY MEDICINE

## 2024-12-12 PROCEDURE — 94640 AIRWAY INHALATION TREATMENT: CPT

## 2024-12-12 PROCEDURE — 71045 X-RAY EXAM CHEST 1 VIEW: CPT | Performed by: RADIOLOGY

## 2024-12-12 PROCEDURE — 2500000004 HC RX 250 GENERAL PHARMACY W/ HCPCS (ALT 636 FOR OP/ED): Performed by: EMERGENCY MEDICINE

## 2024-12-12 PROCEDURE — 2500000004 HC RX 250 GENERAL PHARMACY W/ HCPCS (ALT 636 FOR OP/ED): Performed by: RADIOLOGY

## 2024-12-12 PROCEDURE — 96375 TX/PRO/DX INJ NEW DRUG ADDON: CPT

## 2024-12-12 PROCEDURE — 74177 CT ABD & PELVIS W/CONTRAST: CPT | Performed by: RADIOLOGY

## 2024-12-12 PROCEDURE — 86901 BLOOD TYPING SEROLOGIC RH(D): CPT | Performed by: EMERGENCY MEDICINE

## 2024-12-12 PROCEDURE — 82435 ASSAY OF BLOOD CHLORIDE: CPT | Performed by: NURSE PRACTITIONER

## 2024-12-12 RX ORDER — LACOSAMIDE 100 MG/1
100 TABLET ORAL EVERY 12 HOURS SCHEDULED
Status: DISCONTINUED | OUTPATIENT
Start: 2024-12-12 | End: 2024-12-20 | Stop reason: HOSPADM

## 2024-12-12 RX ORDER — L. ACIDOPHILUS/L.BULGARICUS 1MM CELL
1 TABLET ORAL 2 TIMES DAILY
Status: DISCONTINUED | OUTPATIENT
Start: 2024-12-12 | End: 2024-12-20 | Stop reason: HOSPADM

## 2024-12-12 RX ORDER — FENTANYL CITRATE 50 UG/ML
INJECTION, SOLUTION INTRAMUSCULAR; INTRAVENOUS
Status: COMPLETED | OUTPATIENT
Start: 2024-12-12 | End: 2024-12-12

## 2024-12-12 RX ORDER — DEXTROSE 50 % IN WATER (D50W) INTRAVENOUS SYRINGE
25
Status: DISCONTINUED | OUTPATIENT
Start: 2024-12-12 | End: 2024-12-20 | Stop reason: HOSPADM

## 2024-12-12 RX ORDER — LIDOCAINE HYDROCHLORIDE 10 MG/ML
INJECTION, SOLUTION EPIDURAL; INFILTRATION; INTRACAUDAL; PERINEURAL
Status: COMPLETED | OUTPATIENT
Start: 2024-12-12 | End: 2024-12-12

## 2024-12-12 RX ORDER — IPRATROPIUM BROMIDE AND ALBUTEROL SULFATE 2.5; .5 MG/3ML; MG/3ML
3 SOLUTION RESPIRATORY (INHALATION) EVERY 4 HOURS PRN
Status: DISCONTINUED | OUTPATIENT
Start: 2024-12-12 | End: 2024-12-12

## 2024-12-12 RX ORDER — IPRATROPIUM BROMIDE AND ALBUTEROL SULFATE 2.5; .5 MG/3ML; MG/3ML
3 SOLUTION RESPIRATORY (INHALATION) EVERY 2 HOUR PRN
Status: DISCONTINUED | OUTPATIENT
Start: 2024-12-12 | End: 2024-12-20 | Stop reason: HOSPADM

## 2024-12-12 RX ORDER — OXYCODONE AND ACETAMINOPHEN 5; 325 MG/1; MG/1
1 TABLET ORAL EVERY 8 HOURS PRN
Status: DISCONTINUED | OUTPATIENT
Start: 2024-12-12 | End: 2024-12-20 | Stop reason: HOSPADM

## 2024-12-12 RX ORDER — AMMONIUM LACTATE 12 G/100G
2 LOTION TOPICAL EVERY EVENING
Status: DISCONTINUED | OUTPATIENT
Start: 2024-12-12 | End: 2024-12-20 | Stop reason: HOSPADM

## 2024-12-12 RX ORDER — ONDANSETRON HYDROCHLORIDE 2 MG/ML
4 INJECTION, SOLUTION INTRAVENOUS EVERY 4 HOURS PRN
Status: DISCONTINUED | OUTPATIENT
Start: 2024-12-12 | End: 2024-12-20 | Stop reason: HOSPADM

## 2024-12-12 RX ORDER — PANTOPRAZOLE SODIUM 40 MG/1
40 TABLET, DELAYED RELEASE ORAL
Status: DISCONTINUED | OUTPATIENT
Start: 2024-12-13 | End: 2024-12-20 | Stop reason: HOSPADM

## 2024-12-12 RX ORDER — SODIUM CHLORIDE, SODIUM LACTATE, POTASSIUM CHLORIDE, CALCIUM CHLORIDE 600; 310; 30; 20 MG/100ML; MG/100ML; MG/100ML; MG/100ML
50 INJECTION, SOLUTION INTRAVENOUS CONTINUOUS
Status: ACTIVE | OUTPATIENT
Start: 2024-12-12 | End: 2024-12-13

## 2024-12-12 RX ORDER — INSULIN LISPRO 100 [IU]/ML
0-10 INJECTION, SOLUTION INTRAVENOUS; SUBCUTANEOUS EVERY 4 HOURS
Status: DISCONTINUED | OUTPATIENT
Start: 2024-12-12 | End: 2024-12-12

## 2024-12-12 RX ORDER — OXYCODONE AND ACETAMINOPHEN 5; 325 MG/1; MG/1
1 TABLET ORAL 2 TIMES DAILY
Status: DISCONTINUED | OUTPATIENT
Start: 2024-12-12 | End: 2024-12-20 | Stop reason: HOSPADM

## 2024-12-12 RX ORDER — VANCOMYCIN HYDROCHLORIDE 1 G/20ML
INJECTION, POWDER, LYOPHILIZED, FOR SOLUTION INTRAVENOUS DAILY PRN
Status: DISCONTINUED | OUTPATIENT
Start: 2024-12-12 | End: 2024-12-15

## 2024-12-12 RX ORDER — FOLIC ACID 1 MG/1
1 TABLET ORAL DAILY
Status: DISCONTINUED | OUTPATIENT
Start: 2024-12-12 | End: 2024-12-20 | Stop reason: HOSPADM

## 2024-12-12 RX ORDER — ACETAMINOPHEN 650 MG/1
650 SUPPOSITORY RECTAL EVERY 4 HOURS PRN
Status: DISCONTINUED | OUTPATIENT
Start: 2024-12-12 | End: 2024-12-20 | Stop reason: HOSPADM

## 2024-12-12 RX ORDER — MORPHINE SULFATE 4 MG/ML
4 INJECTION, SOLUTION INTRAMUSCULAR; INTRAVENOUS EVERY 4 HOURS PRN
Status: DISCONTINUED | OUTPATIENT
Start: 2024-12-12 | End: 2024-12-20 | Stop reason: HOSPADM

## 2024-12-12 RX ORDER — ALBUTEROL SULFATE 0.83 MG/ML
2.5 SOLUTION RESPIRATORY (INHALATION) EVERY 4 HOURS PRN
Status: DISCONTINUED | OUTPATIENT
Start: 2024-12-12 | End: 2024-12-18

## 2024-12-12 RX ORDER — DOCUSATE SODIUM 100 MG/1
100 CAPSULE, LIQUID FILLED ORAL 2 TIMES DAILY
Status: DISCONTINUED | OUTPATIENT
Start: 2024-12-12 | End: 2024-12-20 | Stop reason: HOSPADM

## 2024-12-12 RX ORDER — BACLOFEN 10 MG/1
5 TABLET ORAL 4 TIMES DAILY
Status: DISCONTINUED | OUTPATIENT
Start: 2024-12-12 | End: 2024-12-20 | Stop reason: HOSPADM

## 2024-12-12 RX ORDER — METOPROLOL SUCCINATE 25 MG/1
25 TABLET, EXTENDED RELEASE ORAL DAILY
Status: DISCONTINUED | OUTPATIENT
Start: 2024-12-12 | End: 2024-12-20 | Stop reason: HOSPADM

## 2024-12-12 RX ORDER — MIDAZOLAM HYDROCHLORIDE 1 MG/ML
INJECTION, SOLUTION INTRAMUSCULAR; INTRAVENOUS
Status: COMPLETED | OUTPATIENT
Start: 2024-12-12 | End: 2024-12-12

## 2024-12-12 RX ORDER — SERTRALINE HYDROCHLORIDE 50 MG/1
50 TABLET, FILM COATED ORAL DAILY
Status: DISCONTINUED | OUTPATIENT
Start: 2024-12-12 | End: 2024-12-20 | Stop reason: HOSPADM

## 2024-12-12 RX ORDER — ACETAMINOPHEN 325 MG/1
650 TABLET ORAL EVERY 4 HOURS PRN
Status: DISCONTINUED | OUTPATIENT
Start: 2024-12-12 | End: 2024-12-12

## 2024-12-12 RX ORDER — MORPHINE SULFATE 2 MG/ML
2 INJECTION, SOLUTION INTRAMUSCULAR; INTRAVENOUS EVERY 4 HOURS PRN
Status: DISCONTINUED | OUTPATIENT
Start: 2024-12-12 | End: 2024-12-20 | Stop reason: HOSPADM

## 2024-12-12 RX ORDER — VANCOMYCIN HYDROCHLORIDE 1 G/200ML
1000 INJECTION, SOLUTION INTRAVENOUS EVERY 12 HOURS
Status: DISCONTINUED | OUTPATIENT
Start: 2024-12-12 | End: 2024-12-13

## 2024-12-12 RX ORDER — CYCLOBENZAPRINE HCL 10 MG
10 TABLET ORAL 2 TIMES DAILY
Status: DISCONTINUED | OUTPATIENT
Start: 2024-12-12 | End: 2024-12-20 | Stop reason: HOSPADM

## 2024-12-12 RX ORDER — FENTANYL CITRATE 50 UG/ML
50 INJECTION, SOLUTION INTRAMUSCULAR; INTRAVENOUS ONCE
Status: COMPLETED | OUTPATIENT
Start: 2024-12-12 | End: 2024-12-12

## 2024-12-12 RX ORDER — BUDESONIDE 0.5 MG/2ML
0.5 INHALANT ORAL
Status: DISCONTINUED | OUTPATIENT
Start: 2024-12-12 | End: 2024-12-20 | Stop reason: HOSPADM

## 2024-12-12 RX ORDER — DEXTROSE 50 % IN WATER (D50W) INTRAVENOUS SYRINGE
12.5
Status: DISCONTINUED | OUTPATIENT
Start: 2024-12-12 | End: 2024-12-20 | Stop reason: HOSPADM

## 2024-12-12 RX ORDER — MAGNESIUM SULFATE HEPTAHYDRATE 40 MG/ML
2 INJECTION, SOLUTION INTRAVENOUS ONCE
Status: COMPLETED | OUTPATIENT
Start: 2024-12-12 | End: 2024-12-12

## 2024-12-12 RX ADMIN — PIPERACILLIN SODIUM AND TAZOBACTAM SODIUM 3.38 G: 3; .375 INJECTION, SOLUTION INTRAVENOUS at 02:51

## 2024-12-12 RX ADMIN — DOCUSATE SODIUM 100 MG: 100 CAPSULE, LIQUID FILLED ORAL at 14:29

## 2024-12-12 RX ADMIN — IOHEXOL 75 ML: 350 INJECTION, SOLUTION INTRAVENOUS at 01:42

## 2024-12-12 RX ADMIN — SODIUM CHLORIDE 1000 ML: 9 INJECTION, SOLUTION INTRAVENOUS at 02:51

## 2024-12-12 RX ADMIN — PIPERACILLIN SODIUM AND TAZOBACTAM SODIUM 3.38 G: 3; .375 INJECTION, SOLUTION INTRAVENOUS at 21:01

## 2024-12-12 RX ADMIN — FOLIC ACID 1 MG: 1 TABLET ORAL at 14:24

## 2024-12-12 RX ADMIN — BACLOFEN 5 MG: 10 TABLET ORAL at 20:08

## 2024-12-12 RX ADMIN — APIXABAN 5 MG: 5 TABLET, FILM COATED ORAL at 14:24

## 2024-12-12 RX ADMIN — FENTANYL CITRATE 50 MCG: 50 INJECTION INTRAMUSCULAR; INTRAVENOUS at 00:28

## 2024-12-12 RX ADMIN — CYCLOBENZAPRINE 10 MG: 10 TABLET, FILM COATED ORAL at 14:30

## 2024-12-12 RX ADMIN — OXYCODONE HYDROCHLORIDE AND ACETAMINOPHEN 1 TABLET: 5; 325 TABLET ORAL at 22:08

## 2024-12-12 RX ADMIN — OXYCODONE HYDROCHLORIDE AND ACETAMINOPHEN 1 TABLET: 5; 325 TABLET ORAL at 14:24

## 2024-12-12 RX ADMIN — MIDAZOLAM 0.5 MG: 1 INJECTION INTRAMUSCULAR; INTRAVENOUS at 16:21

## 2024-12-12 RX ADMIN — BUDESONIDE 0.5 MG: 0.5 INHALANT ORAL at 19:25

## 2024-12-12 RX ADMIN — FENTANYL CITRATE 50 MCG: 50 INJECTION, SOLUTION INTRAMUSCULAR; INTRAVENOUS at 16:21

## 2024-12-12 RX ADMIN — PIPERACILLIN SODIUM AND TAZOBACTAM SODIUM 3.38 G: 3; .375 INJECTION, SOLUTION INTRAVENOUS at 10:14

## 2024-12-12 RX ADMIN — Medication 30 PERCENT: at 05:21

## 2024-12-12 RX ADMIN — METOPROLOL SUCCINATE 25 MG: 25 TABLET, EXTENDED RELEASE ORAL at 14:24

## 2024-12-12 RX ADMIN — SODIUM CHLORIDE, POTASSIUM CHLORIDE, SODIUM LACTATE AND CALCIUM CHLORIDE 50 ML/HR: 600; 310; 30; 20 INJECTION, SOLUTION INTRAVENOUS at 12:18

## 2024-12-12 RX ADMIN — CYCLOBENZAPRINE 10 MG: 10 TABLET, FILM COATED ORAL at 22:08

## 2024-12-12 RX ADMIN — SODIUM CHLORIDE 1000 ML: 9 INJECTION, SOLUTION INTRAVENOUS at 00:28

## 2024-12-12 RX ADMIN — VANCOMYCIN HYDROCHLORIDE 1500 MG: 1.5 INJECTION, POWDER, LYOPHILIZED, FOR SOLUTION INTRAVENOUS at 04:04

## 2024-12-12 RX ADMIN — Medication 40 L/MIN: at 20:11

## 2024-12-12 RX ADMIN — BACLOFEN 5 MG: 10 TABLET ORAL at 14:24

## 2024-12-12 RX ADMIN — LACOSAMIDE 100 MG: 100 TABLET, FILM COATED ORAL at 22:08

## 2024-12-12 RX ADMIN — SERTRALINE HYDROCHLORIDE 50 MG: 50 TABLET ORAL at 14:24

## 2024-12-12 RX ADMIN — DOCUSATE SODIUM 100 MG: 100 CAPSULE, LIQUID FILLED ORAL at 22:08

## 2024-12-12 RX ADMIN — APIXABAN 5 MG: 5 TABLET, FILM COATED ORAL at 22:08

## 2024-12-12 RX ADMIN — LIDOCAINE HYDROCHLORIDE 5 ML: 10 INJECTION, SOLUTION EPIDURAL; INFILTRATION; INTRACAUDAL; PERINEURAL at 16:21

## 2024-12-12 RX ADMIN — Medication 1 TABLET: at 14:30

## 2024-12-12 RX ADMIN — Medication 35 PERCENT: at 07:28

## 2024-12-12 RX ADMIN — Medication 2 APPLICATION: at 20:05

## 2024-12-12 RX ADMIN — LACOSAMIDE 100 MG: 100 TABLET, FILM COATED ORAL at 14:24

## 2024-12-12 RX ADMIN — Medication 1 TABLET: at 22:08

## 2024-12-12 RX ADMIN — IOHEXOL 10 ML: 350 INJECTION, SOLUTION INTRAVENOUS at 16:50

## 2024-12-12 RX ADMIN — BACLOFEN 5 MG: 10 TABLET ORAL at 17:15

## 2024-12-12 RX ADMIN — DEXTROSE MONOHYDRATE 750 MG: 50 INJECTION, SOLUTION INTRAVENOUS at 20:36

## 2024-12-12 RX ADMIN — MAGNESIUM SULFATE HEPTAHYDRATE 2 G: 40 INJECTION, SOLUTION INTRAVENOUS at 02:51

## 2024-12-12 RX ADMIN — VANCOMYCIN HYDROCHLORIDE 1000 MG: 1 INJECTION, SOLUTION INTRAVENOUS at 15:23

## 2024-12-12 RX ADMIN — PIPERACILLIN SODIUM AND TAZOBACTAM SODIUM 3.38 G: 3; .375 INJECTION, SOLUTION INTRAVENOUS at 14:32

## 2024-12-12 SDOH — SOCIAL STABILITY: SOCIAL INSECURITY: ARE YOU OR HAVE YOU BEEN THREATENED OR ABUSED PHYSICALLY, EMOTIONALLY, OR SEXUALLY BY ANYONE?: NO

## 2024-12-12 SDOH — SOCIAL STABILITY: SOCIAL INSECURITY: HAVE YOU HAD THOUGHTS OF HARMING ANYONE ELSE?: NO

## 2024-12-12 SDOH — SOCIAL STABILITY: SOCIAL INSECURITY: HAS ANYONE EVER THREATENED TO HURT YOUR FAMILY OR YOUR PETS?: NO

## 2024-12-12 SDOH — SOCIAL STABILITY: SOCIAL INSECURITY: HAVE YOU HAD ANY THOUGHTS OF HARMING ANYONE ELSE?: NO

## 2024-12-12 SDOH — SOCIAL STABILITY: SOCIAL INSECURITY: ARE THERE ANY APPARENT SIGNS OF INJURIES/BEHAVIORS THAT COULD BE RELATED TO ABUSE/NEGLECT?: NO

## 2024-12-12 SDOH — SOCIAL STABILITY: SOCIAL INSECURITY: DO YOU FEEL ANYONE HAS EXPLOITED OR TAKEN ADVANTAGE OF YOU FINANCIALLY OR OF YOUR PERSONAL PROPERTY?: NO

## 2024-12-12 SDOH — SOCIAL STABILITY: SOCIAL INSECURITY: DO YOU FEEL UNSAFE GOING BACK TO THE PLACE WHERE YOU ARE LIVING?: NO

## 2024-12-12 SDOH — SOCIAL STABILITY: SOCIAL INSECURITY: DOES ANYONE TRY TO KEEP YOU FROM HAVING/CONTACTING OTHER FRIENDS OR DOING THINGS OUTSIDE YOUR HOME?: NO

## 2024-12-12 SDOH — SOCIAL STABILITY: SOCIAL INSECURITY: ABUSE: ADULT

## 2024-12-12 ASSESSMENT — LIFESTYLE VARIABLES
HOW OFTEN DO YOU HAVE A DRINK CONTAINING ALCOHOL: NEVER
SKIP TO QUESTIONS 9-10: 1
AUDIT-C TOTAL SCORE: 0
HOW OFTEN DO YOU HAVE 6 OR MORE DRINKS ON ONE OCCASION: NEVER
HOW MANY STANDARD DRINKS CONTAINING ALCOHOL DO YOU HAVE ON A TYPICAL DAY: PATIENT DOES NOT DRINK
AUDIT-C TOTAL SCORE: 0

## 2024-12-12 ASSESSMENT — PATIENT HEALTH QUESTIONNAIRE - PHQ9
1. LITTLE INTEREST OR PLEASURE IN DOING THINGS: NOT AT ALL
2. FEELING DOWN, DEPRESSED OR HOPELESS: NOT AT ALL
SUM OF ALL RESPONSES TO PHQ9 QUESTIONS 1 & 2: 0

## 2024-12-12 ASSESSMENT — ACTIVITIES OF DAILY LIVING (ADL)
LACK_OF_TRANSPORTATION: PATIENT DECLINED
HEARING - RIGHT EAR: FUNCTIONAL
FEEDING YOURSELF: INDEPENDENT
PATIENT'S MEMORY ADEQUATE TO SAFELY COMPLETE DAILY ACTIVITIES?: YES
DRESSING YOURSELF: NEEDS ASSISTANCE
JUDGMENT_ADEQUATE_SAFELY_COMPLETE_DAILY_ACTIVITIES: YES
WALKS IN HOME: UNABLE TO ASSESS
ADEQUATE_TO_COMPLETE_ADL: YES
BATHING: NEEDS ASSISTANCE
GROOMING: NEEDS ASSISTANCE
ASSISTIVE_DEVICE: WHEELCHAIR
HEARING - LEFT EAR: FUNCTIONAL
TOILETING: NEEDS ASSISTANCE

## 2024-12-12 ASSESSMENT — COGNITIVE AND FUNCTIONAL STATUS - GENERAL: PATIENT BASELINE BEDBOUND: YES

## 2024-12-12 ASSESSMENT — PAIN - FUNCTIONAL ASSESSMENT: PAIN_FUNCTIONAL_ASSESSMENT: 0-10

## 2024-12-12 ASSESSMENT — PAIN SCALES - GENERAL
PAINLEVEL_OUTOF10: 0 - NO PAIN
PAINLEVEL_OUTOF10: 5 - MODERATE PAIN
PAINLEVEL_OUTOF10: 0 - NO PAIN
PAINLEVEL_OUTOF10: 4
PAINLEVEL_OUTOF10: 0 - NO PAIN

## 2024-12-12 NOTE — CONSULTS
Vancomycin Dosing by Pharmacy- INITIAL    Dionte Sharpe is a 54 y.o. year old male who Pharmacy has been consulted for vancomycin dosing for UTI. Based on the patient's indication and renal status this patient will be dosed based on a goal AUC of 400-600.     Renal function is currently stable.    Visit Vitals  /69   Pulse (!) 146   Temp 37.8 °C (100 °F)   Resp (!) 24        Lab Results   Component Value Date    CREATININE 1.10 2024    CREATININE 0.79 10/28/2024    CREATININE 0.91 10/21/2024    CREATININE 0.92 10/20/2024        Patient weight is as follows:   Vitals:    24 2354   Weight: 97.5 kg (215 lb)       Cultures:  No results found for the encounter in last 14 days.        No intake/output data recorded.  I/O during current shift:  No intake/output data recorded.    Temp (24hrs), Av.8 °C (100 °F), Min:37.8 °C (100 °F), Max:37.8 °C (100 °F)         Assessment/Plan     Patient has already been given a loading dose of 1500 mg.  Will initiate vancomycin maintenance,  1000 mg every 12 hours.    This dosing regimen is predicted by [employer] to result in the following pharmacokinetic parameters:  Regimen: 1000 mg IV every 12 hours.  Start time: 16:04 on 2024  Exposure target: AUC24 (range)400-600 mg/L.hr   GPU74-65: 424 mg/L.hr  AUC24,ss: 476 mg/L.hr  Probability of AUC24 > 400: 69 %  Ctrough,ss: 15.5 mg/L  Probability of Ctrough,ss > 20: 28 %  Follow-up level will be ordered on  at 1000 unless clinically indicated sooner.  Will continue to monitor renal function daily while on vancomycin and order serum creatinine at least every 48 hours if not already ordered.  Follow for continued vancomycin needs, clinical response, and signs/symptoms of toxicity.       Dilip Masters [facility]

## 2024-12-12 NOTE — POST-PROCEDURE NOTE
Interventional Radiology Brief Postprocedure Note    Attending: Ion Lopez MD    Assistant:   Staff Role   Rosemary Ruggiero MT Radiology Technologist   Ion Lopez MD Radiologist   Taylor Bennett, RN Radiology Nurse       Diagnosis:   1. Sepsis, due to unspecified organism, unspecified whether acute organ dysfunction present (Multi)            Description of procedure: Left 10F nephrostomy tube exchange. No hydronephrosis.    Timeout:  Yes    Procedure Area: Procedure Area     Anesthesia:   Conscious Sedation    Complications: None    Estimated Blood Loss: minimal    Medications (Filter: Administrations occurring from 1634 to 1634 on 12/12/24) As of 12/12/24 1634      None          No specimens collected      See detailed result report with images in PACS.    The patient tolerated the procedure well without incident or complication and is in stable condition.

## 2024-12-12 NOTE — PROGRESS NOTES
Pharmacy Medication History Review    Dionte Sharpe is a 54 y.o. male admitted for Sepsis, due to unspecified organism, unspecified whether acute organ dysfunction present (Multi). Pharmacy reviewed the patient's [id number] medications and allergies for accuracy.    The list below reflectives the updated PTA list. Please review each medication in order reconciliation for additional clarification and justification.  Prior to Admission medications    Medication Sig Start Date End Date Authorizing Provider   albuterol 2.5 mg /3 mL (0.083 %) nebulizer solution Take 3 mL (2.5 mg) by nebulization every 4 hours if needed for shortness of breath.   Historical Provider, MD   ammonium lactate (Amlactin) 12 % cream Apply 2 Applications topically once daily in the evening. To BLE/feet   Historical Provider, MD   apixaban (Eliquis) 5 mg tablet Take 1 tablet (5 mg) by mouth 2 times a day.   Historical Provider, MD   baclofen (Lioresal) 5 mg tablet Take 1 tablet (5 mg) by mouth 4 times a day.   Historical Provider, MD   benzocaine 20 % mucosal gel Use 1 Application in the mouth or throat every 6 hours if needed (tooth pain).   Historical Provider, MD   budesonide (Pulmicort) 0.5 mg/2 mL nebulizer solution Take 2 mL (0.5 mg) by nebulization 2 times a day.   Historical Provider, MD   cholecalciferol (Vitamin D-3) 5,000 Units tablet Take 1 tablet (5,000 Units) by mouth 2 times a week. On Tuesday and Thursday   Historical Provider, MD   cyclobenzaprine (Flexeril) 10 mg tablet Take 1 tablet (10 mg) by mouth 2 times a day.   Historical Provider, MD   dextromethorphan-guaifenesin (Robitussin DM)  mg/5 mL oral liquid Take 10 mL by mouth every 4 hours if needed for cough.   Historical Provider, MD   docusate sodium (Colace) 100 mg capsule Take 1 capsule (100 mg) by mouth 2 times a day.   Eddie Rodriguez DO   folic acid (Folvite) 1 mg tablet Take 1 tablet (1 mg) by mouth once daily.   Historical Provider, MD   lacosamide  (Vimpat) 100 mg tablet Take 1 tablet (100 mg) by mouth twice a day.   Historical Provider, MD   lactobacillus acidophilus tablet tablet Take 1 tablet by mouth 2 times a day.   Eddie Rodriguez DO   levETIRAcetam (Keppra) 750 mg tablet Take 1 tablet (750 mg) by mouth 2 times a day.   Historical Provider, MD   loratadine (Claritin) 10 mg tablet Take 1 tablet (10 mg) by mouth once daily.   Historical Provider, MD   metFORMIN (Glucophage) 500 mg tablet Take 1 tablet (500 mg) by mouth once daily.   Historical Provider, MD   metoprolol succinate XL (Toprol-XL) 25 mg 24 hr tablet Take 1 tablet (25 mg) by mouth once daily.   Historical Provider, MD   omeprazole (PriLOSEC) 20 mg DR capsule Take 1 capsule (20 mg) by mouth once daily.   Historical Provider, MD   ondansetron (Zofran) 4 mg tablet Take 1 tablet (4 mg) by mouth every 8 hours if needed for nausea or vomiting.   Historical Provider, MD   oxyCODONE-acetaminophen (Percocet) 5-325 mg tablet Take 1 tablet by mouth 2 times a day.   Historical Provider, MD   oxyCODONE-acetaminophen (Percocet) 5-325 mg tablet Take 1 tablet by mouth every 8 hours if needed for severe pain (7 - 10) (breakthrough pain).   Historical Provider, MD   pyridoxine (Vitamin B-6) 100 mg tablet Take 1 tablet (100 mg) by mouth once daily. Do not start before December 1, 2023.   Eddie Rodriguez DO   sertraline (Zoloft) 50 mg tablet Take 1 tablet (50 mg) by mouth once daily.   Historical Provider, MD        The list below reflectives the updated allergy list. Please review each documented allergy for additional clarification and justification.  Allergies  Reviewed by Carlos Hernandez RN on 12/11/2024   No Known Allergies         Below are additional concerns with the patient's PTA list.      Stephany Schmid

## 2024-12-12 NOTE — PROGRESS NOTES
12/12/24 1338   Discharge Planning   Living Arrangements Other (Comment)   Support Systems Other (Comment)   Assistance Needed Pt is LTC bedhold at [facility]   Type of Residence Skilled nursing facility   Who is requesting discharge planning? Patient   Home or Post Acute Services Post acute facilities (Rehab/SNF/etc)   Type of Post Acute Facility Services Long term care   Expected Discharge Disposition SNF   Does the patient need discharge transport arranged? Yes   RoundTrip coordination needed? Yes     MSW met with the pt at bedside. Pt is alert and engaging. MSW made introduction and explained role. MSW confirmed the pt is LTC bed hold status and confirmed permission to start referral. Pt is requesting physical copies of all his paperwork. MSW shares with the pt the process of medical record request following his discharge. Pt verbalized understanding of this. Pt to return to facility as LTC when medically cleared. MSW tasked DSC to start this process.   BYRON Delarosa, MSW

## 2024-12-12 NOTE — ED NOTES
Assumed care of pt at 0720. Pt transport was put in and report was called to floor.  Pt bed changed after.     Eve Banks RN  12/12/24 7310

## 2024-12-12 NOTE — ED TRIAGE NOTES
Pt BIBA from [facility] with multi med complaints. Pt states today he's had increased headache, fevers, chills, increased tremors, nausea. Pt a/o/4 upon arrival, pt is trach dependant 6lpm
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
## 2024-12-12 NOTE — PROGRESS NOTES
Occupational Therapy                 Therapy Communication Note    Patient Name: Dionte Sharpe  MRN: [id number]  Department: [facility]  Room: [address] [place] [address]-A  Today's Date: 12/12/2024     Discipline: Occupational Therapy    OT Missed Visit: Yes     Missed Visit Reason: Missed Visit Reason:  (per EMR, pt. bedbound, total care for ADL, will discharge ot.)    Missed Time: Attempt    Comment:

## 2024-12-12 NOTE — ED PROVIDER NOTES
HPI   Chief Complaint   Patient presents with    multi medical complaint       This is a 54 years old chronically ill male patient presented to the emergency department with multiple medical complaints.  He came by squad from assisted living facility.  He has a trach and uses 6 L of oxygen per minute.  Reported abdominal pain, fever, chills, body aches, flu symptoms, headache, and nausea.  Denies chest pain, shortness of breath, cough, new weakness or focal numbness.    Review of system: As stated above in the HPI section              Patient History   Past Medical History:   Diagnosis Date    Cerebral palsy     Kidney stones      Past Surgical History:   Procedure Laterality Date    IR  NEPHROSTOMY PLACEMENT  11/25/2023    IR  NEPHROSTOMY PLACEMENT 11/25/2023 Omid Desir MD [facility]     No family history on file.  Social History     Tobacco Use    Smoking status: Former     Types: Cigarettes    Smokeless tobacco: Never   Substance Use Topics    Alcohol use: Not Currently    Drug use: Not on file       Physical Exam   ED Triage Vitals [12/11/24 2354]   Temperature Heart Rate Respirations BP   37.8 °C (100 °F) (!) 163 (!) 22 (!) 134/95      Pulse Ox Temp src Heart Rate Source Patient Position   96 % -- -- --      BP Location FiO2 (%)     -- --       Physical Exam  Vitals and nursing note reviewed.   Constitutional:       General: He is not in acute distress.     Appearance: He is well-developed.   HENT:      Head: Normocephalic and atraumatic.   Eyes:      Conjunctiva/sclera: Conjunctivae normal.   Cardiovascular:      Rate and Rhythm: Regular rhythm. Tachycardia present.      Heart sounds: No murmur heard.  Pulmonary:      Effort: Respiratory distress present.      Breath sounds: Rhonchi and rales present.      Comments: Appears tachypneic, tachycardic, appears to be in respiratory distress.  Abdominal:      General: There is distension.      Palpations: Abdomen is soft.      Tenderness: There is no  abdominal tenderness. There is no guarding or rebound.      Comments: Scar from multiple abdominal surgeries, colostomy bag with normal colored output.    Diffuse abdominal tenderness and discomfort on palpation, no localized tenderness, guarding, rigidity, rebound.   Musculoskeletal:         General: No swelling.      Cervical back: Neck supple.   Skin:     General: Skin is warm and dry.      Capillary Refill: Capillary refill takes less than 2 seconds.   Neurological:      General: No focal deficit present.      Mental Status: He is alert and oriented to person, place, and time. Mental status is at baseline.      Comments: Alert and oriented x 4.  In no acute neurologic deficit.  Patient is at baseline neurologically   Psychiatric:         Mood and Affect: Mood normal.           ED Course & MDM   Diagnoses as of 12/12/24 0357   Sepsis, due to unspecified organism, unspecified whether acute organ dysfunction present (Multi)                 No data recorded     Natalie Coma Scale Score: 15 (12/11/24 6615 : Carlos Hernandez RN)                           Medical Decision Making  Patient seen and examined.  Patient appears chronically ill with multiple comorbid conditions.  Given the complaint of abdominal pain, nausea, fever, chills, body aches, flu symptoms, and headache we will proceed with obtaining CT abdomen pelvis as well as basic labs, cardiac workup given the patient's shortness of breath and tachypnea as well as tachycardia.  Will administer 1 L of IV fluids given the tachycardia.  Will administer fentanyl for pain control.  Will initiate sepsis workup.    Tachycardia is improving with IV fluid resuscitation, CT scan with a concern of stool in the rectum but the patient stated that he does not pass stool per rectum and depends only on the colostomy.  Urine appears infected.  Patient is covered empirically with vancomycin and Zosyn.  Admitted given his urosepsis, admitted to medicine team.    I spent 35  minutes of critical care time managing the patient's sepsis including IV fluids, IV antibiotics, not including any billable procedures    EKG revealed sinus tachycardia with a rate of 155 bpm, normal TN, QRS, and normal QTc duration.  No ST segment or T wave pathology.  Good R wave progression throughout the precordial leads.        Procedure  Procedures     Jean Claude Mooney,   12/12/24 0357       Jean Claude Mooney,   01/06/25 0943

## 2024-12-12 NOTE — PROGRESS NOTES
"Physical Therapy                 Therapy Communication Note    Patient Name: Dionte Sharpe  MRN: [id number]  Department: [facility] ICU  Room: 125/125-A  Today's Date: 12/12/2024     Discipline: Physical Therapy    PT Missed Visit: Yes     Missed Visit Reason:  PT SCREEN: Per previous EMR notes: \"Pt is from [facility] and is bedbound at baseline. Pt states staff have attempted to get him into a WC in the past, however have used a antonina lift to do so. He is also total assist for all ADLs and states he usually does not get dressed at baseline.\"     Missed Time: Attempt      "

## 2024-12-13 LAB
ATRIAL RATE: 155 BPM
BACTERIA UR CULT: NORMAL
P AXIS: 45 DEGREES
PR INTERVAL: 119 MS
Q ONSET: 251 MS
QRS COUNT: 25 BEATS
QRS DURATION: 108 MS
QT INTERVAL: 266 MS
QTC CALCULATION(BAZETT): 428 MS
QTC FREDERICIA: 364 MS
R AXIS: -36 DEGREES
T AXIS: 46 DEGREES
T OFFSET: 384 MS
VANCOMYCIN SERPL-MCNC: 22.6 UG/ML (ref 5–20)
VENTRICULAR RATE: 155 BPM

## 2024-12-13 PROCEDURE — 80202 ASSAY OF VANCOMYCIN: CPT | Performed by: NURSE PRACTITIONER

## 2024-12-13 PROCEDURE — 2500000001 HC RX 250 WO HCPCS SELF ADMINISTERED DRUGS (ALT 637 FOR MEDICARE OP)

## 2024-12-13 PROCEDURE — 2500000002 HC RX 250 W HCPCS SELF ADMINISTERED DRUGS (ALT 637 FOR MEDICARE OP, ALT 636 FOR OP/ED): Performed by: INTERNAL MEDICINE

## 2024-12-13 PROCEDURE — 2500000002 HC RX 250 W HCPCS SELF ADMINISTERED DRUGS (ALT 637 FOR MEDICARE OP, ALT 636 FOR OP/ED)

## 2024-12-13 PROCEDURE — 2500000004 HC RX 250 GENERAL PHARMACY W/ HCPCS (ALT 636 FOR OP/ED): Performed by: INTERNAL MEDICINE

## 2024-12-13 PROCEDURE — 1200000002 HC GENERAL ROOM WITH TELEMETRY DAILY

## 2024-12-13 PROCEDURE — 2500000005 HC RX 250 GENERAL PHARMACY W/O HCPCS: Performed by: EMERGENCY MEDICINE

## 2024-12-13 PROCEDURE — 2500000004 HC RX 250 GENERAL PHARMACY W/ HCPCS (ALT 636 FOR OP/ED): Performed by: NURSE PRACTITIONER

## 2024-12-13 PROCEDURE — 36415 COLL VENOUS BLD VENIPUNCTURE: CPT | Performed by: NURSE PRACTITIONER

## 2024-12-13 PROCEDURE — 94640 AIRWAY INHALATION TREATMENT: CPT

## 2024-12-13 RX ADMIN — IPRATROPIUM BROMIDE AND ALBUTEROL SULFATE 3 ML: .5; 3 SOLUTION RESPIRATORY (INHALATION) at 07:16

## 2024-12-13 RX ADMIN — Medication 50 PERCENT: at 11:28

## 2024-12-13 RX ADMIN — BACLOFEN 5 MG: 10 TABLET ORAL at 08:27

## 2024-12-13 RX ADMIN — DEXTROSE MONOHYDRATE 750 MG: 50 INJECTION, SOLUTION INTRAVENOUS at 09:54

## 2024-12-13 RX ADMIN — SERTRALINE HYDROCHLORIDE 50 MG: 50 TABLET ORAL at 08:23

## 2024-12-13 RX ADMIN — OXYCODONE HYDROCHLORIDE AND ACETAMINOPHEN 1 TABLET: 5; 325 TABLET ORAL at 21:01

## 2024-12-13 RX ADMIN — BUDESONIDE 0.5 MG: 0.5 INHALANT ORAL at 18:51

## 2024-12-13 RX ADMIN — Medication 1 APPLICATION: at 16:53

## 2024-12-13 RX ADMIN — VANCOMYCIN HYDROCHLORIDE 1000 MG: 1 INJECTION, SOLUTION INTRAVENOUS at 04:39

## 2024-12-13 RX ADMIN — BACLOFEN 5 MG: 10 TABLET ORAL at 13:34

## 2024-12-13 RX ADMIN — BACLOFEN 5 MG: 10 TABLET ORAL at 17:05

## 2024-12-13 RX ADMIN — PIPERACILLIN SODIUM AND TAZOBACTAM SODIUM 3.38 G: 3; .375 INJECTION, SOLUTION INTRAVENOUS at 09:54

## 2024-12-13 RX ADMIN — APIXABAN 5 MG: 5 TABLET, FILM COATED ORAL at 21:02

## 2024-12-13 RX ADMIN — VANCOMYCIN HYDROCHLORIDE 1500 MG: 1.5 INJECTION, POWDER, LYOPHILIZED, FOR SOLUTION INTRAVENOUS at 15:12

## 2024-12-13 RX ADMIN — BUDESONIDE 0.5 MG: 0.5 INHALANT ORAL at 07:16

## 2024-12-13 RX ADMIN — PIPERACILLIN SODIUM AND TAZOBACTAM SODIUM 3.38 G: 3; .375 INJECTION, SOLUTION INTRAVENOUS at 23:20

## 2024-12-13 RX ADMIN — Medication 2 APPLICATION: at 21:13

## 2024-12-13 RX ADMIN — Medication 1 TABLET: at 21:02

## 2024-12-13 RX ADMIN — LACOSAMIDE 100 MG: 100 TABLET, FILM COATED ORAL at 21:02

## 2024-12-13 RX ADMIN — APIXABAN 5 MG: 5 TABLET, FILM COATED ORAL at 08:23

## 2024-12-13 RX ADMIN — DEXTROSE MONOHYDRATE 750 MG: 50 INJECTION, SOLUTION INTRAVENOUS at 19:52

## 2024-12-13 RX ADMIN — PIPERACILLIN SODIUM AND TAZOBACTAM SODIUM 3.38 G: 3; .375 INJECTION, SOLUTION INTRAVENOUS at 16:53

## 2024-12-13 RX ADMIN — PANTOPRAZOLE SODIUM 40 MG: 40 TABLET, DELAYED RELEASE ORAL at 08:22

## 2024-12-13 RX ADMIN — METOPROLOL SUCCINATE 25 MG: 25 TABLET, EXTENDED RELEASE ORAL at 08:23

## 2024-12-13 RX ADMIN — DOCUSATE SODIUM 100 MG: 100 CAPSULE, LIQUID FILLED ORAL at 21:01

## 2024-12-13 RX ADMIN — Medication 1 TABLET: at 08:23

## 2024-12-13 RX ADMIN — BACLOFEN 5 MG: 10 TABLET ORAL at 21:01

## 2024-12-13 RX ADMIN — FOLIC ACID 1 MG: 1 TABLET ORAL at 08:22

## 2024-12-13 RX ADMIN — OXYCODONE HYDROCHLORIDE AND ACETAMINOPHEN 1 TABLET: 5; 325 TABLET ORAL at 08:23

## 2024-12-13 RX ADMIN — CYCLOBENZAPRINE 10 MG: 10 TABLET, FILM COATED ORAL at 08:23

## 2024-12-13 RX ADMIN — CYCLOBENZAPRINE 10 MG: 10 TABLET, FILM COATED ORAL at 21:01

## 2024-12-13 RX ADMIN — Medication 40 L/MIN: at 19:59

## 2024-12-13 RX ADMIN — DOCUSATE SODIUM 100 MG: 100 CAPSULE, LIQUID FILLED ORAL at 08:23

## 2024-12-13 RX ADMIN — PIPERACILLIN SODIUM AND TAZOBACTAM SODIUM 3.38 G: 3; .375 INJECTION, SOLUTION INTRAVENOUS at 04:07

## 2024-12-13 RX ADMIN — ALBUTEROL SULFATE 2.5 MG: 2.5 SOLUTION RESPIRATORY (INHALATION) at 18:52

## 2024-12-13 RX ADMIN — LACOSAMIDE 100 MG: 100 TABLET, FILM COATED ORAL at 08:22

## 2024-12-13 ASSESSMENT — COGNITIVE AND FUNCTIONAL STATUS - GENERAL
DAILY ACTIVITIY SCORE: 6
MOVING TO AND FROM BED TO CHAIR: TOTAL
TURNING FROM BACK TO SIDE WHILE IN FLAT BAD: A LOT
MOBILITY SCORE: 8
DRESSING REGULAR LOWER BODY CLOTHING: TOTAL
PERSONAL GROOMING: TOTAL
EATING MEALS: TOTAL
WALKING IN HOSPITAL ROOM: TOTAL
STANDING UP FROM CHAIR USING ARMS: TOTAL
DRESSING REGULAR UPPER BODY CLOTHING: TOTAL
CLIMB 3 TO 5 STEPS WITH RAILING: TOTAL
TOILETING: TOTAL
HELP NEEDED FOR BATHING: TOTAL
MOVING FROM LYING ON BACK TO SITTING ON SIDE OF FLAT BED WITH BEDRAILS: A LOT

## 2024-12-13 ASSESSMENT — PAIN SCALES - GENERAL
PAINLEVEL_OUTOF10: 6
PAINLEVEL_OUTOF10: 6
PAINLEVEL_OUTOF10: 0 - NO PAIN
PAINLEVEL_OUTOF10: 1
PAINLEVEL_OUTOF10: 0 - NO PAIN

## 2024-12-13 ASSESSMENT — PAIN DESCRIPTION - LOCATION: LOCATION: GENERALIZED

## 2024-12-13 ASSESSMENT — PAIN - FUNCTIONAL ASSESSMENT
PAIN_FUNCTIONAL_ASSESSMENT: 0-10

## 2024-12-13 NOTE — CARE PLAN
The patient's goals for the shift include      The clinical goals for the shift include safety and comfort    Over the shift, the patient did not make progress toward the following goals. Barriers to progression include cronic trach, hx of kidney stones. Recommendations to address these barriers include continue to observe.
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
## 2024-12-13 NOTE — PROGRESS NOTES
12/13/24 0914   Discharge Planning   Living Arrangements Alone   Support Systems Family members   Type of Residence Nursing home/residential care   Who is requesting discharge planning? Patient   Home or Post Acute Services Post acute facilities (Rehab/SNF/etc)   Type of Post Acute Facility Services Long term care   Expected Discharge Disposition Inter  ([facility] [place])   Does the patient need discharge transport arranged? Yes   RoundTrip coordination needed? Yes     Reviewed chart. Per [facility] [place] patient will need a longterm auth to return.

## 2024-12-13 NOTE — PROGRESS NOTES
Vancomycin Dosing by Pharmacy- FOLLOW UP    Dionte Sharpe is a 54 y.o. year old male who Pharmacy has been consulted for vancomycin dosing for UTI. Based on the patient's indication and renal status this patient is being dosed based on a goal AUC of 400-600.     Renal function is currently stable.    Current vancomycin dose: 1000 mg given every 12 hours    Estimated vancomycin AUC on current dose: 608 mg/L.hr     Visit Vitals  /86   Pulse 93   Temp 36.4 °C (97.5 °F) (Temporal)   Resp (!) 35        Lab Results   Component Value Date    CREATININE 1.05 2024    CREATININE 1.10 2024    CREATININE 0.79 10/28/2024    CREATININE 0.91 10/21/2024        Patient weight is as follows:   Vitals:    24 1550   Weight: 97.5 kg (214 lb 15.2 oz)       Cultures:  No results found for the encounter in last 14 days.       I/O last 3 completed shifts:  In: 1067.5 (10.9 mL/kg) [I.V.:817.5 (8.4 mL/kg); IV Piggyback:250]  Out: 1975 (20.3 mL/kg) [Urine:1875 (0.5 mL/kg/hr); Stool:100]  Weight: 97.5 kg   I/O during current shift:  I/O this shift:  In: -   Out: 700 [Urine:700]    Temp (24hrs), Av.6 °C (97.8 °F), Min:36.1 °C (97 °F), Max:37.2 °C (99 °F)      Assessment/Plan    Above goal AUC. Orders placed for new vancomcyin regimen of 1500 every 24 hours to begin at 1500.    This dosing regimen is predicted by [employer] to result in the following pharmacokinetic parameters:  [id number]: 472 mg/L.hr  AUC24,ss: 453 mg/L.hr  Probability of AUC24 > 400: 76 %  Ctrough,ss: 11.5 mg/L  Probability of Ctrough,ss > 20: 4 %    The next level will be obtained on 12 at AM labs. May be obtained sooner if clinically indicated.   Will continue to monitor renal function daily while on vancomycin and order serum creatinine at least every 48 hours if not already ordered.  Follow for continued vancomycin needs, clinical response, and signs/symptoms of toxicity.       Daniel J Weiland, PharmD

## 2024-12-13 NOTE — CARE PLAN
Problem: Skin  Goal: Decreased wound size/increased tissue granulation at next dressing change  Outcome: Progressing  Flowsheets (Taken 12/13/2024 0224)  Decreased wound size/increased tissue granulation at next dressing change:   Promote sleep for wound healing   Utilize specialty bed per algorithm   Protective dressings over bony prominences  Goal: Participates in plan/prevention/treatment measures  Outcome: Progressing  Flowsheets (Taken 12/13/2024 0224)  Participates in plan/prevention/treatment measures:   Discuss with provider PT/OT consult   Elevate heels   Increase activity/out of bed for meals  Goal: Prevent/manage excess moisture  Outcome: Progressing  Flowsheets (Taken 12/13/2024 0224)  Prevent/manage excess moisture:   Cleanse incontinence/protect with barrier cream   Moisturize dry skin   Follow provider orders for dressing changes   Monitor for/manage infection if present  Goal: Prevent/minimize sheer/friction injuries  Outcome: Progressing  Flowsheets (Taken 12/13/2024 0224)  Prevent/minimize sheer/friction injuries:   Complete micro-shifts as needed if patient unable. Adjust patient position to relieve pressure points, not a full turn   Increase activity/out of bed for meals   Use pull sheet   Turn/reposition every 2 hours/use positioning/transfer devices   Utilize specialty bed per algorithm  Goal: Promote/optimize nutrition  Outcome: Progressing  Flowsheets (Taken 12/13/2024 0224)  Promote/optimize nutrition:   Assist with feeding   Monitor/record intake including meals   Consume > 50% meals/supplements   Offer water/supplements/favorite foods   Discuss with provider if NPO > 2 days   Reassess MST if dietician not consulted  Goal: Promote skin healing  Outcome: Progressing  Flowsheets (Taken 12/13/2024 0224)  Promote skin healing:   Assess skin/pad under line(s)/device(s)   Protective dressings over bony prominences   Turn/reposition every 2 hours/use positioning/transfer devices   Ensure correct  size (line/device) and apply per  instructions   Rotate device position/do not position patient on device   The patient's goals for the shift include      The clinical goals for the shift include safety and comfort

## 2024-12-14 LAB
HOLD SPECIMEN: NORMAL
VANCOMYCIN SERPL-MCNC: 15.1 UG/ML (ref 5–20)

## 2024-12-14 PROCEDURE — 80053 COMPREHEN METABOLIC PANEL: CPT | Performed by: INTERNAL MEDICINE

## 2024-12-14 PROCEDURE — 36415 COLL VENOUS BLD VENIPUNCTURE: CPT | Performed by: INTERNAL MEDICINE

## 2024-12-14 PROCEDURE — 2500000005 HC RX 250 GENERAL PHARMACY W/O HCPCS: Performed by: EMERGENCY MEDICINE

## 2024-12-14 PROCEDURE — 2500000004 HC RX 250 GENERAL PHARMACY W/ HCPCS (ALT 636 FOR OP/ED): Performed by: NURSE PRACTITIONER

## 2024-12-14 PROCEDURE — 2500000004 HC RX 250 GENERAL PHARMACY W/ HCPCS (ALT 636 FOR OP/ED): Mod: JZ | Performed by: INTERNAL MEDICINE

## 2024-12-14 PROCEDURE — 80202 ASSAY OF VANCOMYCIN: CPT | Performed by: INTERNAL MEDICINE

## 2024-12-14 PROCEDURE — 2500000001 HC RX 250 WO HCPCS SELF ADMINISTERED DRUGS (ALT 637 FOR MEDICARE OP)

## 2024-12-14 PROCEDURE — 2500000002 HC RX 250 W HCPCS SELF ADMINISTERED DRUGS (ALT 637 FOR MEDICARE OP, ALT 636 FOR OP/ED)

## 2024-12-14 PROCEDURE — 1200000002 HC GENERAL ROOM WITH TELEMETRY DAILY

## 2024-12-14 PROCEDURE — 85027 COMPLETE CBC AUTOMATED: CPT | Performed by: INTERNAL MEDICINE

## 2024-12-14 PROCEDURE — 94640 AIRWAY INHALATION TREATMENT: CPT

## 2024-12-14 RX ADMIN — PIPERACILLIN SODIUM AND TAZOBACTAM SODIUM 3.38 G: 3; .375 INJECTION, SOLUTION INTRAVENOUS at 23:15

## 2024-12-14 RX ADMIN — APIXABAN 5 MG: 5 TABLET, FILM COATED ORAL at 08:21

## 2024-12-14 RX ADMIN — Medication 1 TABLET: at 08:21

## 2024-12-14 RX ADMIN — Medication 40 L/MIN: at 20:27

## 2024-12-14 RX ADMIN — PANTOPRAZOLE SODIUM 40 MG: 40 TABLET, DELAYED RELEASE ORAL at 08:21

## 2024-12-14 RX ADMIN — BACLOFEN 5 MG: 10 TABLET ORAL at 08:20

## 2024-12-14 RX ADMIN — APIXABAN 5 MG: 5 TABLET, FILM COATED ORAL at 20:23

## 2024-12-14 RX ADMIN — FOLIC ACID 1 MG: 1 TABLET ORAL at 08:21

## 2024-12-14 RX ADMIN — SERTRALINE HYDROCHLORIDE 50 MG: 50 TABLET ORAL at 08:20

## 2024-12-14 RX ADMIN — DEXTROSE MONOHYDRATE 750 MG: 50 INJECTION, SOLUTION INTRAVENOUS at 20:22

## 2024-12-14 RX ADMIN — DEXTROSE MONOHYDRATE 750 MG: 50 INJECTION, SOLUTION INTRAVENOUS at 08:19

## 2024-12-14 RX ADMIN — Medication 2 APPLICATION: at 20:23

## 2024-12-14 RX ADMIN — DOCUSATE SODIUM 100 MG: 100 CAPSULE, LIQUID FILLED ORAL at 08:20

## 2024-12-14 RX ADMIN — DOCUSATE SODIUM 100 MG: 100 CAPSULE, LIQUID FILLED ORAL at 20:23

## 2024-12-14 RX ADMIN — PIPERACILLIN SODIUM AND TAZOBACTAM SODIUM 3.38 G: 3; .375 INJECTION, SOLUTION INTRAVENOUS at 05:37

## 2024-12-14 RX ADMIN — LACOSAMIDE 100 MG: 100 TABLET, FILM COATED ORAL at 20:23

## 2024-12-14 RX ADMIN — BACLOFEN 5 MG: 10 TABLET ORAL at 20:23

## 2024-12-14 RX ADMIN — CYCLOBENZAPRINE 10 MG: 10 TABLET, FILM COATED ORAL at 08:20

## 2024-12-14 RX ADMIN — BACLOFEN 5 MG: 10 TABLET ORAL at 17:17

## 2024-12-14 RX ADMIN — CYCLOBENZAPRINE 10 MG: 10 TABLET, FILM COATED ORAL at 20:23

## 2024-12-14 RX ADMIN — OXYCODONE HYDROCHLORIDE AND ACETAMINOPHEN 1 TABLET: 5; 325 TABLET ORAL at 08:20

## 2024-12-14 RX ADMIN — BACLOFEN 5 MG: 10 TABLET ORAL at 13:26

## 2024-12-14 RX ADMIN — PIPERACILLIN SODIUM AND TAZOBACTAM SODIUM 3.38 G: 3; .375 INJECTION, SOLUTION INTRAVENOUS at 17:17

## 2024-12-14 RX ADMIN — LACOSAMIDE 100 MG: 100 TABLET, FILM COATED ORAL at 08:20

## 2024-12-14 RX ADMIN — PIPERACILLIN SODIUM AND TAZOBACTAM SODIUM 3.38 G: 3; .375 INJECTION, SOLUTION INTRAVENOUS at 11:35

## 2024-12-14 RX ADMIN — BUDESONIDE 0.5 MG: 0.5 INHALANT ORAL at 07:08

## 2024-12-14 RX ADMIN — Medication 1 TABLET: at 20:23

## 2024-12-14 RX ADMIN — BUDESONIDE 0.5 MG: 0.5 INHALANT ORAL at 18:42

## 2024-12-14 RX ADMIN — METOPROLOL SUCCINATE 25 MG: 25 TABLET, EXTENDED RELEASE ORAL at 08:20

## 2024-12-14 RX ADMIN — Medication 40 PERCENT: at 08:22

## 2024-12-14 RX ADMIN — OXYCODONE HYDROCHLORIDE AND ACETAMINOPHEN 1 TABLET: 5; 325 TABLET ORAL at 20:22

## 2024-12-14 RX ADMIN — VANCOMYCIN HYDROCHLORIDE 1500 MG: 1.5 INJECTION, POWDER, LYOPHILIZED, FOR SOLUTION INTRAVENOUS at 15:19

## 2024-12-14 SDOH — ECONOMIC STABILITY: HOUSING INSECURITY: AT ANY TIME IN THE PAST 12 MONTHS, WERE YOU HOMELESS OR LIVING IN A SHELTER (INCLUDING NOW)?: NO

## 2024-12-14 SDOH — SOCIAL STABILITY: SOCIAL INSECURITY: WITHIN THE LAST YEAR, HAVE YOU BEEN AFRAID OF YOUR PARTNER OR EX-PARTNER?: NO

## 2024-12-14 SDOH — HEALTH STABILITY: PHYSICAL HEALTH
HOW OFTEN DO YOU NEED TO HAVE SOMEONE HELP YOU WHEN YOU READ INSTRUCTIONS, PAMPHLETS, OR OTHER WRITTEN MATERIAL FROM YOUR DOCTOR OR PHARMACY?: NEVER

## 2024-12-14 SDOH — ECONOMIC STABILITY: FOOD INSECURITY: HOW HARD IS IT FOR YOU TO PAY FOR THE VERY BASICS LIKE FOOD, HOUSING, MEDICAL CARE, AND HEATING?: PATIENT DECLINED

## 2024-12-14 SDOH — SOCIAL STABILITY: SOCIAL INSECURITY: WITHIN THE LAST YEAR, HAVE YOU BEEN HUMILIATED OR EMOTIONALLY ABUSED IN OTHER WAYS BY YOUR PARTNER OR EX-PARTNER?: NO

## 2024-12-14 SDOH — ECONOMIC STABILITY: FOOD INSECURITY: WITHIN THE PAST 12 MONTHS, THE FOOD YOU BOUGHT JUST DIDN'T LAST AND YOU DIDN'T HAVE MONEY TO GET MORE.: PATIENT DECLINED

## 2024-12-14 SDOH — ECONOMIC STABILITY: FOOD INSECURITY
WITHIN THE PAST 12 MONTHS, YOU WORRIED THAT YOUR FOOD WOULD RUN OUT BEFORE YOU GOT THE MONEY TO BUY MORE.: PATIENT DECLINED

## 2024-12-14 SDOH — ECONOMIC STABILITY: INCOME INSECURITY
IN THE PAST 12 MONTHS HAS THE ELECTRIC, GAS, OIL, OR WATER COMPANY THREATENED TO SHUT OFF SERVICES IN YOUR HOME?: PATIENT UNABLE TO ANSWER

## 2024-12-14 SDOH — ECONOMIC STABILITY: TRANSPORTATION INSECURITY: IN THE PAST 12 MONTHS, HAS LACK OF TRANSPORTATION KEPT YOU FROM MEDICAL APPOINTMENTS OR FROM GETTING MEDICATIONS?: NO

## 2024-12-14 SDOH — ECONOMIC STABILITY: HOUSING INSECURITY: IN THE LAST 12 MONTHS, WAS THERE A TIME WHEN YOU WERE NOT ABLE TO PAY THE MORTGAGE OR RENT ON TIME?: PATIENT DECLINED

## 2024-12-14 SDOH — ECONOMIC STABILITY: HOUSING INSECURITY: IN THE PAST 12 MONTHS, HOW MANY TIMES HAVE YOU MOVED WHERE YOU WERE LIVING?: 1

## 2024-12-14 ASSESSMENT — PAIN SCALES - GENERAL: PAINLEVEL_OUTOF10: 6

## 2024-12-14 ASSESSMENT — PAIN - FUNCTIONAL ASSESSMENT: PAIN_FUNCTIONAL_ASSESSMENT: 0-10

## 2024-12-14 ASSESSMENT — ACTIVITIES OF DAILY LIVING (ADL): LACK_OF_TRANSPORTATION: NO

## 2024-12-14 NOTE — PROGRESS NOTES
Vancomycin Dosing by Pharmacy- FOLLOW UP    Dionte Sharpe is a 54 y.o. year old male who Pharmacy has been consulted for vancomycin dosing for other UTI . Based on the patient's indication and renal status this patient is being dosed based on a goal AUC of 400-600.     Renal function is currently stable.    Current vancomycin dose: 1500 mg given every 24 hours    Estimated vancomycin AUC on current dose: 403 mg/L.hr   Most recent random vancomycin level: 15.1 micrograms/dL from 05 on     Visit Vitals  /86   Pulse 80   Temp 36.6 °C (97.9 °F) (Temporal)   Resp (!) 30        Lab Results   Component Value Date    CREATININE 1.05 2024    CREATININE 1.10 2024    CREATININE 0.79 10/28/2024    CREATININE 0.91 10/21/2024        Patient weight is as follows:   Vitals:    24 1550   Weight: 97.5 kg (214 lb 15.2 oz)       Cultures:  No results found for the encounter in last 14 days.       I/O last 3 completed shifts:  In: 1570 (16.1 mL/kg) [I.V.:570 (5.8 mL/kg); IV Piggyback:1000]  Out: 2945 (30.2 mL/kg) [Urine:2495 (0.7 mL/kg/hr); Stool:450]  Weight: 97.5 kg   I/O during current shift:  No intake/output data recorded.    Temp (24hrs), Av.7 °C (98.1 °F), Min:36.4 °C (97.5 °F), Max:37.1 °C (98.8 °F)      Assessment/Plan    Within goal AUC range. Continue current vancomycin regimen.    This dosing regimen is predicted by [employer] to result in the following pharmacokinetic parameters:    Regimen: 1500 mg IV every 24 hours.  Start time: 15:12 on 2024  Exposure target: AUC24 (range)400-600 mg/L.hr   [id number]: 431 mg/L.hr  AUC24,ss: 403 mg/L.hr  Probability of AUC24 > 400: 52 %  Ctrough,ss: 9 mg/L  Probability of Ctrough,ss > 20: 0 %      The next level will be obtained on  at 0500. May be obtained sooner if clinically indicated.   Will continue to monitor renal function daily while on vancomycin and order serum creatinine at least every 48 hours if not already ordered.  Follow for  continued vancomycin needs, clinical response, and signs/symptoms of toxicity.       Josh Mcgrath, PharmD

## 2024-12-15 VITALS
RESPIRATION RATE: 18 BRPM | BODY MASS INDEX: 30.19 KG/M2 | HEIGHT: 71 IN | TEMPERATURE: 97.3 F | DIASTOLIC BLOOD PRESSURE: 79 MMHG | WEIGHT: 215.61 LBS | SYSTOLIC BLOOD PRESSURE: 142 MMHG | OXYGEN SATURATION: 98 % | HEART RATE: 96 BPM

## 2024-12-15 LAB
ALBUMIN SERPL BCP-MCNC: 3.5 G/DL (ref 3.4–5)
ALP SERPL-CCNC: 62 U/L (ref 33–120)
ALT SERPL W P-5'-P-CCNC: 16 U/L (ref 10–52)
ANION GAP SERPL CALC-SCNC: 8 MMOL/L (ref 10–20)
AST SERPL W P-5'-P-CCNC: 13 U/L (ref 9–39)
BACTERIA BLD CULT: NORMAL
BACTERIA BLD CULT: NORMAL
BILIRUB SERPL-MCNC: 0.3 MG/DL (ref 0–1.2)
BUN SERPL-MCNC: 15 MG/DL (ref 6–23)
CALCIUM SERPL-MCNC: 8.6 MG/DL (ref 8.6–10.3)
CHLORIDE SERPL-SCNC: 99 MMOL/L (ref 98–107)
CO2 SERPL-SCNC: 35 MMOL/L (ref 21–32)
CREAT SERPL-MCNC: 1.04 MG/DL (ref 0.5–1.3)
EGFRCR SERPLBLD CKD-EPI 2021: 86 ML/MIN/1.73M*2
ERYTHROCYTE [DISTWIDTH] IN BLOOD BY AUTOMATED COUNT: 19.6 % (ref 11.5–14.5)
GLUCOSE SERPL-MCNC: 172 MG/DL (ref 74–99)
HCT VFR BLD AUTO: 33.4 % (ref 41–52)
HGB BLD-MCNC: 8.8 G/DL (ref 13.5–17.5)
MCH RBC QN AUTO: 19.3 PG (ref 26–34)
MCHC RBC AUTO-ENTMCNC: 26.3 G/DL (ref 32–36)
MCV RBC AUTO: 73 FL (ref 80–100)
NRBC BLD-RTO: 0 /100 WBCS (ref 0–0)
PLATELET # BLD AUTO: 285 X10*3/UL (ref 150–450)
POTASSIUM SERPL-SCNC: 4.1 MMOL/L (ref 3.5–5.3)
PROT SERPL-MCNC: 7.1 G/DL (ref 6.4–8.2)
RBC # BLD AUTO: 4.57 X10*6/UL (ref 4.5–5.9)
SODIUM SERPL-SCNC: 138 MMOL/L (ref 136–145)
WBC # BLD AUTO: 7.9 X10*3/UL (ref 4.4–11.3)

## 2024-12-15 PROCEDURE — 2500000005 HC RX 250 GENERAL PHARMACY W/O HCPCS: Performed by: EMERGENCY MEDICINE

## 2024-12-15 PROCEDURE — 94799 UNLISTED PULMONARY SVC/PX: CPT

## 2024-12-15 PROCEDURE — 2500000001 HC RX 250 WO HCPCS SELF ADMINISTERED DRUGS (ALT 637 FOR MEDICARE OP)

## 2024-12-15 PROCEDURE — 2500000004 HC RX 250 GENERAL PHARMACY W/ HCPCS (ALT 636 FOR OP/ED): Performed by: NURSE PRACTITIONER

## 2024-12-15 PROCEDURE — 2500000002 HC RX 250 W HCPCS SELF ADMINISTERED DRUGS (ALT 637 FOR MEDICARE OP, ALT 636 FOR OP/ED)

## 2024-12-15 PROCEDURE — 1200000002 HC GENERAL ROOM WITH TELEMETRY DAILY

## 2024-12-15 PROCEDURE — 94640 AIRWAY INHALATION TREATMENT: CPT

## 2024-12-15 RX ADMIN — APIXABAN 5 MG: 5 TABLET, FILM COATED ORAL at 20:00

## 2024-12-15 RX ADMIN — DEXTROSE MONOHYDRATE 750 MG: 50 INJECTION, SOLUTION INTRAVENOUS at 09:41

## 2024-12-15 RX ADMIN — PIPERACILLIN SODIUM AND TAZOBACTAM SODIUM 3.38 G: 3; .375 INJECTION, SOLUTION INTRAVENOUS at 11:02

## 2024-12-15 RX ADMIN — DOCUSATE SODIUM 100 MG: 100 CAPSULE, LIQUID FILLED ORAL at 09:41

## 2024-12-15 RX ADMIN — BACLOFEN 5 MG: 10 TABLET ORAL at 20:00

## 2024-12-15 RX ADMIN — PIPERACILLIN SODIUM AND TAZOBACTAM SODIUM 3.38 G: 3; .375 INJECTION, SOLUTION INTRAVENOUS at 22:24

## 2024-12-15 RX ADMIN — Medication 50 PERCENT: at 07:01

## 2024-12-15 RX ADMIN — SERTRALINE HYDROCHLORIDE 50 MG: 50 TABLET ORAL at 09:41

## 2024-12-15 RX ADMIN — BUDESONIDE 0.5 MG: 0.5 INHALANT ORAL at 18:38

## 2024-12-15 RX ADMIN — CYCLOBENZAPRINE 10 MG: 10 TABLET, FILM COATED ORAL at 20:00

## 2024-12-15 RX ADMIN — METOPROLOL SUCCINATE 25 MG: 25 TABLET, EXTENDED RELEASE ORAL at 09:40

## 2024-12-15 RX ADMIN — PIPERACILLIN SODIUM AND TAZOBACTAM SODIUM 3.38 G: 3; .375 INJECTION, SOLUTION INTRAVENOUS at 16:45

## 2024-12-15 RX ADMIN — OXYCODONE HYDROCHLORIDE AND ACETAMINOPHEN 1 TABLET: 5; 325 TABLET ORAL at 20:00

## 2024-12-15 RX ADMIN — PANTOPRAZOLE SODIUM 40 MG: 40 TABLET, DELAYED RELEASE ORAL at 09:43

## 2024-12-15 RX ADMIN — Medication 2 APPLICATION: at 20:00

## 2024-12-15 RX ADMIN — DEXTROSE MONOHYDRATE 750 MG: 50 INJECTION, SOLUTION INTRAVENOUS at 21:36

## 2024-12-15 RX ADMIN — BUDESONIDE 0.5 MG: 0.5 INHALANT ORAL at 07:03

## 2024-12-15 RX ADMIN — DOCUSATE SODIUM 100 MG: 100 CAPSULE, LIQUID FILLED ORAL at 20:00

## 2024-12-15 RX ADMIN — FOLIC ACID 1 MG: 1 TABLET ORAL at 09:41

## 2024-12-15 RX ADMIN — APIXABAN 5 MG: 5 TABLET, FILM COATED ORAL at 09:41

## 2024-12-15 RX ADMIN — Medication 1 TABLET: at 20:00

## 2024-12-15 RX ADMIN — BACLOFEN 5 MG: 10 TABLET ORAL at 16:45

## 2024-12-15 RX ADMIN — OXYCODONE HYDROCHLORIDE AND ACETAMINOPHEN 1 TABLET: 5; 325 TABLET ORAL at 09:41

## 2024-12-15 RX ADMIN — Medication 1 TABLET: at 09:41

## 2024-12-15 RX ADMIN — PIPERACILLIN SODIUM AND TAZOBACTAM SODIUM 3.38 G: 3; .375 INJECTION, SOLUTION INTRAVENOUS at 04:46

## 2024-12-15 RX ADMIN — LACOSAMIDE 100 MG: 100 TABLET, FILM COATED ORAL at 09:41

## 2024-12-15 RX ADMIN — BACLOFEN 5 MG: 10 TABLET ORAL at 09:43

## 2024-12-15 RX ADMIN — LACOSAMIDE 100 MG: 100 TABLET, FILM COATED ORAL at 20:00

## 2024-12-15 RX ADMIN — CYCLOBENZAPRINE 10 MG: 10 TABLET, FILM COATED ORAL at 09:41

## 2024-12-15 RX ADMIN — BACLOFEN 5 MG: 10 TABLET ORAL at 14:00

## 2024-12-15 ASSESSMENT — COGNITIVE AND FUNCTIONAL STATUS - GENERAL
HELP NEEDED FOR BATHING: TOTAL
MOBILITY SCORE: 8
DRESSING REGULAR LOWER BODY CLOTHING: TOTAL
PERSONAL GROOMING: TOTAL
WALKING IN HOSPITAL ROOM: TOTAL
TURNING FROM BACK TO SIDE WHILE IN FLAT BAD: A LOT
STANDING UP FROM CHAIR USING ARMS: TOTAL
DRESSING REGULAR UPPER BODY CLOTHING: TOTAL
CLIMB 3 TO 5 STEPS WITH RAILING: TOTAL
DAILY ACTIVITIY SCORE: 6
MOVING FROM LYING ON BACK TO SITTING ON SIDE OF FLAT BED WITH BEDRAILS: A LOT
EATING MEALS: TOTAL
MOVING TO AND FROM BED TO CHAIR: TOTAL
TOILETING: TOTAL

## 2024-12-15 ASSESSMENT — PAIN SCALES - GENERAL
PAINLEVEL_OUTOF10: 0 - NO PAIN
PAINLEVEL_OUTOF10: 0 - NO PAIN

## 2024-12-15 ASSESSMENT — PAIN - FUNCTIONAL ASSESSMENT: PAIN_FUNCTIONAL_ASSESSMENT: 0-10
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
## 2024-12-15 NOTE — PROGRESS NOTES
Vancomycin Dosing by Pharmacy- Cessation of Therapy    Consult to pharmacy for vancomycin dosing has been discontinued by the prescriber, pharmacy will sign off at this time.    Please call pharmacy if there are further questions or re-enter a consult if vancomycin is resumed.     Rosemary Rebolledo, MarshalD

## 2024-12-16 ENCOUNTER — APPOINTMENT (OUTPATIENT)
Dept: UROLOGY | Facility: CLINIC | Age: 54
End: 2024-12-16
Payer: COMMERCIAL

## 2024-12-16 LAB
ANION GAP SERPL CALC-SCNC: 7 MMOL/L (ref 10–20)
BACTERIA BLD CULT: NORMAL
BACTERIA BLD CULT: NORMAL
BUN SERPL-MCNC: 16 MG/DL (ref 6–23)
CALCIUM SERPL-MCNC: 8.8 MG/DL (ref 8.6–10.3)
CHLORIDE SERPL-SCNC: 99 MMOL/L (ref 98–107)
CO2 SERPL-SCNC: 37 MMOL/L (ref 21–32)
CREAT SERPL-MCNC: 0.94 MG/DL (ref 0.5–1.3)
EGFRCR SERPLBLD CKD-EPI 2021: >90 ML/MIN/1.73M*2
ERYTHROCYTE [DISTWIDTH] IN BLOOD BY AUTOMATED COUNT: 19.9 % (ref 11.5–14.5)
GLUCOSE SERPL-MCNC: 197 MG/DL (ref 74–99)
HCT VFR BLD AUTO: 33.5 % (ref 41–52)
HGB BLD-MCNC: 8.8 G/DL (ref 13.5–17.5)
MCH RBC QN AUTO: 19.2 PG (ref 26–34)
MCHC RBC AUTO-ENTMCNC: 26.3 G/DL (ref 32–36)
MCV RBC AUTO: 73 FL (ref 80–100)
NRBC BLD-RTO: 0 /100 WBCS (ref 0–0)
PLATELET # BLD AUTO: 287 X10*3/UL (ref 150–450)
POTASSIUM SERPL-SCNC: 4 MMOL/L (ref 3.5–5.3)
RBC # BLD AUTO: 4.58 X10*6/UL (ref 4.5–5.9)
SODIUM SERPL-SCNC: 139 MMOL/L (ref 136–145)
WBC # BLD AUTO: 6.1 X10*3/UL (ref 4.4–11.3)

## 2024-12-16 PROCEDURE — 2500000004 HC RX 250 GENERAL PHARMACY W/ HCPCS (ALT 636 FOR OP/ED): Performed by: NURSE PRACTITIONER

## 2024-12-16 PROCEDURE — 80048 BASIC METABOLIC PNL TOTAL CA: CPT | Performed by: PHYSICIAN ASSISTANT

## 2024-12-16 PROCEDURE — 85027 COMPLETE CBC AUTOMATED: CPT | Performed by: PHYSICIAN ASSISTANT

## 2024-12-16 PROCEDURE — 2500000001 HC RX 250 WO HCPCS SELF ADMINISTERED DRUGS (ALT 637 FOR MEDICARE OP): Performed by: INTERNAL MEDICINE

## 2024-12-16 PROCEDURE — 2500000004 HC RX 250 GENERAL PHARMACY W/ HCPCS (ALT 636 FOR OP/ED): Performed by: INTERNAL MEDICINE

## 2024-12-16 PROCEDURE — 94640 AIRWAY INHALATION TREATMENT: CPT

## 2024-12-16 PROCEDURE — 36415 COLL VENOUS BLD VENIPUNCTURE: CPT | Performed by: PHYSICIAN ASSISTANT

## 2024-12-16 PROCEDURE — 2500000002 HC RX 250 W HCPCS SELF ADMINISTERED DRUGS (ALT 637 FOR MEDICARE OP, ALT 636 FOR OP/ED)

## 2024-12-16 PROCEDURE — 2500000001 HC RX 250 WO HCPCS SELF ADMINISTERED DRUGS (ALT 637 FOR MEDICARE OP): Performed by: PHYSICIAN ASSISTANT

## 2024-12-16 PROCEDURE — 2500000001 HC RX 250 WO HCPCS SELF ADMINISTERED DRUGS (ALT 637 FOR MEDICARE OP)

## 2024-12-16 PROCEDURE — 1200000002 HC GENERAL ROOM WITH TELEMETRY DAILY

## 2024-12-16 PROCEDURE — 2500000005 HC RX 250 GENERAL PHARMACY W/O HCPCS: Performed by: INTERNAL MEDICINE

## 2024-12-16 RX ORDER — HYDROXYZINE HYDROCHLORIDE 10 MG/1
10 TABLET, FILM COATED ORAL EVERY 6 HOURS PRN
Status: DISCONTINUED | OUTPATIENT
Start: 2024-12-16 | End: 2024-12-20 | Stop reason: HOSPADM

## 2024-12-16 RX ORDER — ERTAPENEM 1 G/1
1 INJECTION, POWDER, LYOPHILIZED, FOR SOLUTION INTRAMUSCULAR; INTRAVENOUS DAILY
Qty: 5 G | Refills: 0 | Status: SHIPPED | OUTPATIENT
Start: 2024-12-16 | End: 2024-12-21

## 2024-12-16 RX ORDER — HYDROXYZINE HYDROCHLORIDE 25 MG/1
25 TABLET, FILM COATED ORAL ONCE
Status: COMPLETED | OUTPATIENT
Start: 2024-12-16 | End: 2024-12-16

## 2024-12-16 RX ORDER — LEVETIRACETAM 500 MG/1
750 TABLET ORAL 2 TIMES DAILY
Status: DISCONTINUED | OUTPATIENT
Start: 2024-12-16 | End: 2024-12-20 | Stop reason: HOSPADM

## 2024-12-16 RX ORDER — PETROLATUM 420 MG/G
OINTMENT TOPICAL
Status: DISCONTINUED | OUTPATIENT
Start: 2024-12-16 | End: 2024-12-20 | Stop reason: HOSPADM

## 2024-12-16 RX ADMIN — SERTRALINE HYDROCHLORIDE 50 MG: 50 TABLET ORAL at 08:52

## 2024-12-16 RX ADMIN — BUDESONIDE 0.5 MG: 0.5 INHALANT ORAL at 07:11

## 2024-12-16 RX ADMIN — Medication 30 L/MIN: at 08:02

## 2024-12-16 RX ADMIN — LACOSAMIDE 100 MG: 100 TABLET, FILM COATED ORAL at 20:58

## 2024-12-16 RX ADMIN — APIXABAN 5 MG: 5 TABLET, FILM COATED ORAL at 08:52

## 2024-12-16 RX ADMIN — BACLOFEN 5 MG: 10 TABLET ORAL at 06:00

## 2024-12-16 RX ADMIN — METOPROLOL SUCCINATE 25 MG: 25 TABLET, EXTENDED RELEASE ORAL at 08:52

## 2024-12-16 RX ADMIN — OXYCODONE HYDROCHLORIDE AND ACETAMINOPHEN 1 TABLET: 5; 325 TABLET ORAL at 22:23

## 2024-12-16 RX ADMIN — OXYCODONE HYDROCHLORIDE AND ACETAMINOPHEN 1 TABLET: 5; 325 TABLET ORAL at 08:52

## 2024-12-16 RX ADMIN — Medication 1 TABLET: at 20:57

## 2024-12-16 RX ADMIN — Medication 2 APPLICATION: at 22:31

## 2024-12-16 RX ADMIN — CYCLOBENZAPRINE 10 MG: 10 TABLET, FILM COATED ORAL at 20:58

## 2024-12-16 RX ADMIN — Medication 1 TABLET: at 08:52

## 2024-12-16 RX ADMIN — ALBUTEROL SULFATE 2.5 MG: 2.5 SOLUTION RESPIRATORY (INHALATION) at 19:37

## 2024-12-16 RX ADMIN — BACLOFEN 5 MG: 10 TABLET ORAL at 16:58

## 2024-12-16 RX ADMIN — APIXABAN 5 MG: 5 TABLET, FILM COATED ORAL at 20:57

## 2024-12-16 RX ADMIN — CYCLOBENZAPRINE 10 MG: 10 TABLET, FILM COATED ORAL at 08:52

## 2024-12-16 RX ADMIN — DOCUSATE SODIUM 100 MG: 100 CAPSULE, LIQUID FILLED ORAL at 08:52

## 2024-12-16 RX ADMIN — BACLOFEN 5 MG: 10 TABLET ORAL at 12:00

## 2024-12-16 RX ADMIN — HYDROXYZINE HYDROCHLORIDE 10 MG: 10 TABLET ORAL at 16:45

## 2024-12-16 RX ADMIN — FOLIC ACID 1 MG: 1 TABLET ORAL at 08:52

## 2024-12-16 RX ADMIN — LACOSAMIDE 100 MG: 100 TABLET, FILM COATED ORAL at 08:52

## 2024-12-16 RX ADMIN — ERTAPENEM SODIUM 1 G: 1 INJECTION, POWDER, LYOPHILIZED, FOR SOLUTION INTRAMUSCULAR; INTRAVENOUS at 12:00

## 2024-12-16 RX ADMIN — BACLOFEN 5 MG: 10 TABLET ORAL at 20:57

## 2024-12-16 RX ADMIN — Medication 30 L/MIN: at 19:40

## 2024-12-16 RX ADMIN — WHITE PETROLATUM: 1.75 OINTMENT TOPICAL at 16:45

## 2024-12-16 RX ADMIN — PANTOPRAZOLE SODIUM 40 MG: 40 TABLET, DELAYED RELEASE ORAL at 06:00

## 2024-12-16 RX ADMIN — HYDROXYZINE HYDROCHLORIDE 25 MG: 25 TABLET ORAL at 00:29

## 2024-12-16 RX ADMIN — PIPERACILLIN SODIUM AND TAZOBACTAM SODIUM 3.38 G: 3; .375 INJECTION, SOLUTION INTRAVENOUS at 05:37

## 2024-12-16 RX ADMIN — DEXTROSE MONOHYDRATE 750 MG: 50 INJECTION, SOLUTION INTRAVENOUS at 11:01

## 2024-12-16 RX ADMIN — DOCUSATE SODIUM 100 MG: 100 CAPSULE, LIQUID FILLED ORAL at 20:58

## 2024-12-16 RX ADMIN — BUDESONIDE 0.5 MG: 0.5 INHALANT ORAL at 19:37

## 2024-12-16 RX ADMIN — LEVETIRACETAM 750 MG: 500 TABLET, FILM COATED ORAL at 20:57

## 2024-12-16 ASSESSMENT — PAIN SCALES - GENERAL
PAINLEVEL_OUTOF10: 5 - MODERATE PAIN
PAINLEVEL_OUTOF10: 2
PAINLEVEL_OUTOF10: 0 - NO PAIN

## 2024-12-16 ASSESSMENT — PAIN DESCRIPTION - LOCATION: LOCATION: GENERALIZED

## 2024-12-16 NOTE — CARE PLAN
The patient's goals for the shift include    Safety and comfort    The clinical goals for the shift include patient will remain HDS throughout shift      Problem: Safety - Adult  Goal: Free from fall injury  Outcome: Progressing     Problem: Discharge Planning  Goal: Discharge to home or other facility with appropriate resources  Outcome: Progressing

## 2024-12-16 NOTE — PROGRESS NOTES
Dionte Sharpe is a 54 y.o. male on day 3 of admission presenting with Sepsis, due to unspecified organism, unspecified whether acute organ dysfunction present (Multi).      Subjective   Seen today so far dry he feels better we discontinued the Vanco no fever no other problems or issues       Objective     Last Recorded Vitals  BP (!) 143/105   Pulse 103   Temp 36.1 °C (97 °F)   Resp (!) 46   Wt 97.8 kg (215 lb 9.8 oz)   SpO2 98%   Intake/Output last 3 Shifts:    Intake/Output Summary (Last 24 hours) at 12/15/2024 1930  Last data filed at 12/15/2024 1824  Gross per 24 hour   Intake 890 ml   Output 1705 ml   Net -815 ml       Admission Weight  Weight: 97.5 kg (215 lb) (12/11/24 2354)    Daily Weight  12/14/24 : 97.8 kg (215 lb 9.8 oz)    Image Results  ECG 12 lead  Sinus tachycardia  Left axis deviation  Low voltage, precordial leads  Consider anterior infarct    Results from last 7 days   Lab Units 12/14/24  2330   WBC AUTO x10*3/uL 7.9   HEMOGLOBIN g/dL 8.8*   HEMATOCRIT % 33.4*   PLATELETS AUTO x10*3/uL 285      Results from last 7 days   Lab Units 12/14/24  2330   SODIUM mmol/L 138   POTASSIUM mmol/L 4.1   CHLORIDE mmol/L 99   CO2 mmol/L 35*   BUN mg/dL 15   CREATININE mg/dL 1.04   GLUCOSE mg/dL 172*   CALCIUM mg/dL 8.6      In the review of systems no fever he has dry skin no arthralgias feels better no pressure urgency weakness obviously    Physical Exam  He is awake and alert and oriented his skin is warm and dry his lungs are clear anteriorly his heart has a regular rate and rhythm his abdomen is soft  Relevant Results               Assessment/Plan   This patient currently has cardiac telemetry ordered; if you would like to modify or discontinue the telemetry order, click here to go to the orders activity to modify/discontinue the order.              Assessment & Plan  Sepsis, due to unspecified organism, unspecified whether acute organ dysfunction present (Multi)    Severe sepsis from his urinary tract  infection and a left pyelonephritis his nephrostomy tube changed out were continuing now with Zosyn and vancomycin was stopped    I will have to find out how long we should run Zosyn for his cultures so far showed no growth his blood cultures were negative and if we should run IV Zosyn on discharge his white count was 7.9 normal and so far I will continue to follow his course and repeat the complete blood count as a need to              Eddie Rodriguez, DO

## 2024-12-16 NOTE — PROGRESS NOTES
"Infectious disease progress note  Subjective   Sepsis resolved  Left pyelonephritis/UTI no growth  Leukocytosis resolved    Antibiotics  Biotic day 5 out of 7  Invanz day 1    Objective   Range of Vitals (last 24 hours)  Heart Rate:  []   Temp:  [35.3 °C (95.5 °F)-36.4 °C (97.5 °F)]   Resp:  [18-46]   BP: (132-159)/()   SpO2:  [91 %-98 %]   Daily Weight  12/14/24 : 97.8 kg (215 lb 9.8 oz)    Body mass index is 30.19 kg/m².      Physical Exam  Patient's nephrostomy was changed out on Friday on this admission  HEENT PERRLA  Chest entry bilaterally  CVS S1-S2 regular  Abdomen is soft colostomy in place  Nephrostomy tube in place  Extremities wasted      Relevant Results  Labs  Lab Results   Component Value Date    WBC 7.9 12/14/2024    HGB 8.8 (L) 12/14/2024    HCT 33.4 (L) 12/14/2024    MCV 73 (L) 12/14/2024     12/14/2024     Lab Results   Component Value Date    GLUCOSE 172 (H) 12/14/2024    CALCIUM 8.6 12/14/2024     12/14/2024    K 4.1 12/14/2024    CO2 35 (H) 12/14/2024    CL 99 12/14/2024    BUN 15 12/14/2024    CREATININE 1.04 12/14/2024   ESR: --No results found for: \"SEDRATE\"No results found for: \"CRP\"  Lab Results   Component Value Date    ALT 16 12/14/2024    AST 13 12/14/2024    ALKPHOS 62 12/14/2024    BILITOT 0.3 12/14/2024       Microbiology  12- blood culture no growth 3 days  12- urine culture mixed matti    Imaging  12- CT abdomen pelvis shows left-sided nephrostomy tube with soft tissue stranding at the bilateral renal pelvis ease    Assessment/Plan   1.  Will simplify antibiotics to Invanz to complete a 10-day total treatment course  2.  Patient will not need a PICC line for this  3.  Will place ECF discharge orders and computers and print orders for antibiotics on the chart    Other issues  Cerebral palsy  Kidney stones  Neurogenic bladder    I reviewed and interpreted all lab test imaging studies and documentations from other healthcare providers  I " am monitoring for antibiotic side effects and toxicity     Hansa Sutherland MD

## 2024-12-16 NOTE — PROGRESS NOTES
Medication Adjustment    The following medication(s) was/were adjusted for Dionte Sharpe per protocol/policy due to  IV to PO Conversion Policy - MM-15 .    Medication(s) adjusted:   Levetiracetam 750mg IV q12h to 750mg PO q12h. Patient was started on IV d/t being NPO for procedure, but is now receiving other PO medications and tolerating a regular diet.    Joseph Seth RP

## 2024-12-16 NOTE — PROGRESS NOTES
12/16/24 1416   Discharge Planning   Living Arrangements Alone   Assistance Needed yes   Type of Residence Nursing home/residential care   Home or Post Acute Services Post acute facilities (Rehab/SNF/etc)   Type of Post Acute Facility Services Long term care   Expected Discharge Disposition Inter   Does the patient need discharge transport arranged? Yes   RoundTrip coordination needed? Yes   Has discharge transport been arranged? No     Patient is from [facility], will need new authorization for long term care prior to return.

## 2024-12-17 PROCEDURE — 94640 AIRWAY INHALATION TREATMENT: CPT

## 2024-12-17 PROCEDURE — 2500000001 HC RX 250 WO HCPCS SELF ADMINISTERED DRUGS (ALT 637 FOR MEDICARE OP): Performed by: INTERNAL MEDICINE

## 2024-12-17 PROCEDURE — 1200000002 HC GENERAL ROOM WITH TELEMETRY DAILY

## 2024-12-17 PROCEDURE — 2500000001 HC RX 250 WO HCPCS SELF ADMINISTERED DRUGS (ALT 637 FOR MEDICARE OP): Performed by: PHYSICIAN ASSISTANT

## 2024-12-17 PROCEDURE — 2500000001 HC RX 250 WO HCPCS SELF ADMINISTERED DRUGS (ALT 637 FOR MEDICARE OP)

## 2024-12-17 PROCEDURE — 2500000002 HC RX 250 W HCPCS SELF ADMINISTERED DRUGS (ALT 637 FOR MEDICARE OP, ALT 636 FOR OP/ED)

## 2024-12-17 PROCEDURE — 2500000005 HC RX 250 GENERAL PHARMACY W/O HCPCS: Performed by: INTERNAL MEDICINE

## 2024-12-17 PROCEDURE — 2500000004 HC RX 250 GENERAL PHARMACY W/ HCPCS (ALT 636 FOR OP/ED): Performed by: INTERNAL MEDICINE

## 2024-12-17 RX ADMIN — DOCUSATE SODIUM 100 MG: 100 CAPSULE, LIQUID FILLED ORAL at 08:38

## 2024-12-17 RX ADMIN — BACLOFEN 5 MG: 10 TABLET ORAL at 18:03

## 2024-12-17 RX ADMIN — ALBUTEROL SULFATE 2.5 MG: 2.5 SOLUTION RESPIRATORY (INHALATION) at 19:21

## 2024-12-17 RX ADMIN — HYDROXYZINE HYDROCHLORIDE 10 MG: 10 TABLET ORAL at 14:36

## 2024-12-17 RX ADMIN — ERTAPENEM SODIUM 1 G: 1 INJECTION, POWDER, LYOPHILIZED, FOR SOLUTION INTRAMUSCULAR; INTRAVENOUS at 14:36

## 2024-12-17 RX ADMIN — APIXABAN 5 MG: 5 TABLET, FILM COATED ORAL at 08:39

## 2024-12-17 RX ADMIN — Medication 50 L/MIN: at 19:23

## 2024-12-17 RX ADMIN — BACLOFEN 5 MG: 10 TABLET ORAL at 14:36

## 2024-12-17 RX ADMIN — LEVETIRACETAM 750 MG: 500 TABLET, FILM COATED ORAL at 08:38

## 2024-12-17 RX ADMIN — LACOSAMIDE 100 MG: 100 TABLET, FILM COATED ORAL at 21:09

## 2024-12-17 RX ADMIN — CYCLOBENZAPRINE 10 MG: 10 TABLET, FILM COATED ORAL at 08:39

## 2024-12-17 RX ADMIN — PANTOPRAZOLE SODIUM 40 MG: 40 TABLET, DELAYED RELEASE ORAL at 05:16

## 2024-12-17 RX ADMIN — Medication 1 TABLET: at 21:09

## 2024-12-17 RX ADMIN — LEVETIRACETAM 750 MG: 500 TABLET, FILM COATED ORAL at 21:10

## 2024-12-17 RX ADMIN — LACOSAMIDE 100 MG: 100 TABLET, FILM COATED ORAL at 08:39

## 2024-12-17 RX ADMIN — BACLOFEN 5 MG: 10 TABLET ORAL at 21:09

## 2024-12-17 RX ADMIN — METOPROLOL SUCCINATE 25 MG: 25 TABLET, EXTENDED RELEASE ORAL at 08:38

## 2024-12-17 RX ADMIN — BUDESONIDE 0.5 MG: 0.5 INHALANT ORAL at 19:21

## 2024-12-17 RX ADMIN — APIXABAN 5 MG: 5 TABLET, FILM COATED ORAL at 21:09

## 2024-12-17 RX ADMIN — Medication 2 APPLICATION: at 21:09

## 2024-12-17 RX ADMIN — OXYCODONE HYDROCHLORIDE AND ACETAMINOPHEN 1 TABLET: 5; 325 TABLET ORAL at 08:39

## 2024-12-17 RX ADMIN — BACLOFEN 5 MG: 10 TABLET ORAL at 05:16

## 2024-12-17 RX ADMIN — BUDESONIDE 0.5 MG: 0.5 INHALANT ORAL at 07:15

## 2024-12-17 RX ADMIN — OXYCODONE HYDROCHLORIDE AND ACETAMINOPHEN 1 TABLET: 5; 325 TABLET ORAL at 21:09

## 2024-12-17 RX ADMIN — SERTRALINE HYDROCHLORIDE 50 MG: 50 TABLET ORAL at 08:39

## 2024-12-17 RX ADMIN — Medication 50 L/MIN: at 21:00

## 2024-12-17 RX ADMIN — FOLIC ACID 1 MG: 1 TABLET ORAL at 08:38

## 2024-12-17 RX ADMIN — Medication 1 TABLET: at 08:39

## 2024-12-17 RX ADMIN — HYDROXYZINE HYDROCHLORIDE 10 MG: 10 TABLET ORAL at 05:16

## 2024-12-17 RX ADMIN — CYCLOBENZAPRINE 10 MG: 10 TABLET, FILM COATED ORAL at 21:09

## 2024-12-17 RX ADMIN — DOCUSATE SODIUM 100 MG: 100 CAPSULE, LIQUID FILLED ORAL at 21:09

## 2024-12-17 ASSESSMENT — PAIN SCALES - GENERAL
PAINLEVEL_OUTOF10: 0 - NO PAIN
PAINLEVEL_OUTOF10: 0 - NO PAIN

## 2024-12-17 ASSESSMENT — COGNITIVE AND FUNCTIONAL STATUS - GENERAL
CLIMB 3 TO 5 STEPS WITH RAILING: TOTAL
MOVING FROM LYING ON BACK TO SITTING ON SIDE OF FLAT BED WITH BEDRAILS: A LOT
MOBILITY SCORE: 7
MOVING TO AND FROM BED TO CHAIR: TOTAL
TURNING FROM BACK TO SIDE WHILE IN FLAT BAD: TOTAL
TOILETING: TOTAL
HELP NEEDED FOR BATHING: TOTAL
PERSONAL GROOMING: A LITTLE
DRESSING REGULAR UPPER BODY CLOTHING: TOTAL
STANDING UP FROM CHAIR USING ARMS: TOTAL
WALKING IN HOSPITAL ROOM: TOTAL
DRESSING REGULAR LOWER BODY CLOTHING: TOTAL
DAILY ACTIVITIY SCORE: 11

## 2024-12-17 NOTE — PROGRESS NOTES
Dionte Sharpe is a 54 y.o. male on day 4 of admission presenting with Sepsis, due to unspecified organism, unspecified whether acute organ dysfunction present (Multi).      Subjective   Seen today treating his severe septic state he is much better we are changing him over to p.o. antibiotics at the time of discharge his Keppra is being switched over to p.o. as well he has had no adverse events no problems overnight he is afebrile his saturations are excellent his nephrostomy tube to his left side has been changed out       Objective     Last Recorded Vitals  /78   Pulse 103   Temp 37 °C (98.6 °F)   Resp 16   Wt 97.8 kg (215 lb 9.8 oz)   SpO2 95%   Intake/Output last 3 Shifts:    Intake/Output Summary (Last 24 hours) at 12/16/2024 2022  Last data filed at 12/16/2024 1712  Gross per 24 hour   Intake 530 ml   Output 1915 ml   Net -1385 ml       Admission Weight  Weight: 97.5 kg (215 lb) (12/11/24 2354)    Daily Weight  12/14/24 : 97.8 kg (215 lb 9.8 oz)    Image Results  ECG 12 lead  Sinus tachycardia  Left axis deviation  Low voltage, precordial leads  Consider anterior infarct    Results from last 7 days   Lab Units 12/16/24  1011   WBC AUTO x10*3/uL 6.1   HEMOGLOBIN g/dL 8.8*   HEMATOCRIT % 33.5*   PLATELETS AUTO x10*3/uL 287      Results from last 7 days   Lab Units 12/16/24  1011   SODIUM mmol/L 139   POTASSIUM mmol/L 4.0   CHLORIDE mmol/L 99   CO2 mmol/L 37*   BUN mg/dL 16   CREATININE mg/dL 0.94   GLUCOSE mg/dL 197*   CALCIUM mg/dL 8.8      In his review of systems is not complaining of any shortness of breath no chest pain skin is dry he has no vomiting or diarrhea    Physical Exam  His lungs sound clear anteriorly his heart has a regular rate and rhythm his abdomen is soft is not distended or firm skin is warm and dry he is not wet or damp no headache  Relevant Results               Assessment/Plan   This patient currently has cardiac telemetry ordered; if you would like to modify or discontinue the  telemetry order, click here to go to the orders activity to modify/discontinue the order.              Assessment & Plan  Sepsis, due to unspecified organism, unspecified whether acute organ dysfunction present (Multi)    Severe sepsis with a left pyelonephritis culture did not grow out anything however his history is multidrug resistance many times he has nephrostomy tube to the left as well he is on Invanz day 1 5 out of 7 days so far being treated white count looks good I will get a repeat his complete blood count when to monitor his H&H    He is already received IV fluids fluid resuscitation complete so far his sepsis has resolved he is afebrile and has been over the last 4872 hours blood pressures are stable he is not on any pressor support at this time      So far he will be able to go back to the ECF probably tomorrow I do not believe he is going to need long-term IV antibiotics his cultures were negative so we can switch him over and his white count looks normal and has been              Eddie Rodriguez, DO

## 2024-12-17 NOTE — CARE PLAN
This [facility] house officer assisting with the patient's discharge at the attending physician's request, Jennifer. Dr. Rodriguez has given me the order to discharge patient from the hospital via telephone call. I have been uninvolved in the patient's care up to this point. Dr. Rodriguez. The pt will be discharged on IV Invanz, and ID Dr. Sutherland has provided the prescription. Pt will continue his outpatient medications on discharge. Tentative plan is to discharge the pt to his long term care facility.

## 2024-12-17 NOTE — PROGRESS NOTES
This am , requested [facility] to obtain long term care authorization for long term care return for anticipated discharge today. The facility has submitted, currently awaiting the authorization.

## 2024-12-17 NOTE — PROGRESS NOTES
"Infectious disease progress note  Subjective   Sepsis resolved  Left pyelonephritis/UTI no growth  Leukocytosis resolved       Antibiotics  Antibiotic day 6 out of 10 days  Invanz day 2    Objective   Range of Vitals (last 24 hours)  Heart Rate:  []   Temp:  [35.4 °C (95.7 °F)-37 °C (98.6 °F)]   Resp:  [16-18]   BP: (131-147)/(77-84)   SpO2:  [93 %-98 %]   Daily Weight  12/14/24 : 97.8 kg (215 lb 9.8 oz)    Body mass index is 30.19 kg/m².      Physical Exam  Patient's nephrostomy was changed out on Friday on this admission  HEENT PERRLA  Chest entry bilaterally  CVS S1-S2 regular  Abdomen is soft colostomy in place  Nephrostomy tube in place  Extremities wasted    Relevant Results  Labs  Lab Results   Component Value Date    WBC 6.1 12/16/2024    HGB 8.8 (L) 12/16/2024    HCT 33.5 (L) 12/16/2024    MCV 73 (L) 12/16/2024     12/16/2024     Lab Results   Component Value Date    GLUCOSE 197 (H) 12/16/2024    CALCIUM 8.8 12/16/2024     12/16/2024    K 4.0 12/16/2024    CO2 37 (H) 12/16/2024    CL 99 12/16/2024    BUN 16 12/16/2024    CREATININE 0.94 12/16/2024   ESR: --No results found for: \"SEDRATE\"No results found for: \"CRP\"  Lab Results   Component Value Date    ALT 16 12/14/2024    AST 13 12/14/2024    ALKPHOS 62 12/14/2024    BILITOT 0.3 12/14/2024       Microbiology  12- blood culture no growth 3 days  12- urine culture mixed matti     Imaging  12- CT abdomen pelvis shows left-sided nephrostomy tube with soft tissue stranding at the bilateral renal pelvis ease      Assessment/Plan   1.  Continue Invanz to complete the 10-day total treatment course  2.  ECF orders were printed and placed on chart    Other issues  Cerebral palsy  Kidney stones  Neurogenic bladder    I reviewed and interpreted all lab test imaging studies and documentations from other healthcare providers  I am monitoring for antibiotic side effects and toxicity     Hansa A Szathmary, MD  "

## 2024-12-17 NOTE — CARE PLAN
The patient's goals for the shift include      The clinical goals for the shift include patent airway    Over the shift, the patient did not make progress toward the following goals. Barriers to progression include ***. Recommendations to address these barriers include ***.

## 2024-12-17 NOTE — NURSING NOTE
Pt having shallow breaths di deep sleep, pulse ox 85-88%.  RRT called and pt increasedon airvo to 40L, 50% with sats 95-96%

## 2024-12-17 NOTE — CARE PLAN
The patient's goals for the shift include      The clinical goals for the shift include patent airway    Over the shift, the patient did not make progress toward the following goals. Barriers to progression include na. Recommendations to address these barriers include na.

## 2024-12-18 ENCOUNTER — APPOINTMENT (OUTPATIENT)
Dept: RADIOLOGY | Facility: HOSPITAL | Age: 54
End: 2024-12-18
Payer: COMMERCIAL

## 2024-12-18 PROCEDURE — 94640 AIRWAY INHALATION TREATMENT: CPT

## 2024-12-18 PROCEDURE — 71045 X-RAY EXAM CHEST 1 VIEW: CPT | Performed by: RADIOLOGY

## 2024-12-18 PROCEDURE — 2500000005 HC RX 250 GENERAL PHARMACY W/O HCPCS: Performed by: INTERNAL MEDICINE

## 2024-12-18 PROCEDURE — 2500000002 HC RX 250 W HCPCS SELF ADMINISTERED DRUGS (ALT 637 FOR MEDICARE OP, ALT 636 FOR OP/ED)

## 2024-12-18 PROCEDURE — 71045 X-RAY EXAM CHEST 1 VIEW: CPT

## 2024-12-18 PROCEDURE — 2500000004 HC RX 250 GENERAL PHARMACY W/ HCPCS (ALT 636 FOR OP/ED): Performed by: INTERNAL MEDICINE

## 2024-12-18 PROCEDURE — 2500000001 HC RX 250 WO HCPCS SELF ADMINISTERED DRUGS (ALT 637 FOR MEDICARE OP): Performed by: INTERNAL MEDICINE

## 2024-12-18 PROCEDURE — 2500000001 HC RX 250 WO HCPCS SELF ADMINISTERED DRUGS (ALT 637 FOR MEDICARE OP): Performed by: PHYSICIAN ASSISTANT

## 2024-12-18 PROCEDURE — 2500000001 HC RX 250 WO HCPCS SELF ADMINISTERED DRUGS (ALT 637 FOR MEDICARE OP)

## 2024-12-18 PROCEDURE — 1200000002 HC GENERAL ROOM WITH TELEMETRY DAILY

## 2024-12-18 RX ORDER — ALBUTEROL SULFATE 0.83 MG/ML
2.5 SOLUTION RESPIRATORY (INHALATION) EVERY 2 HOUR PRN
Status: DISCONTINUED | OUTPATIENT
Start: 2024-12-18 | End: 2024-12-20 | Stop reason: HOSPADM

## 2024-12-18 RX ADMIN — Medication 2 APPLICATION: at 21:02

## 2024-12-18 RX ADMIN — BUDESONIDE 0.5 MG: 0.5 INHALANT ORAL at 19:12

## 2024-12-18 RX ADMIN — LACOSAMIDE 100 MG: 100 TABLET, FILM COATED ORAL at 21:00

## 2024-12-18 RX ADMIN — CYCLOBENZAPRINE 10 MG: 10 TABLET, FILM COATED ORAL at 09:28

## 2024-12-18 RX ADMIN — FOLIC ACID 1 MG: 1 TABLET ORAL at 09:28

## 2024-12-18 RX ADMIN — BACLOFEN 5 MG: 10 TABLET ORAL at 21:00

## 2024-12-18 RX ADMIN — Medication 40 PERCENT: at 07:07

## 2024-12-18 RX ADMIN — BACLOFEN 5 MG: 10 TABLET ORAL at 12:13

## 2024-12-18 RX ADMIN — Medication 30 L/MIN: at 20:00

## 2024-12-18 RX ADMIN — Medication 1 TABLET: at 09:28

## 2024-12-18 RX ADMIN — BACLOFEN 5 MG: 10 TABLET ORAL at 16:48

## 2024-12-18 RX ADMIN — DOCUSATE SODIUM 100 MG: 100 CAPSULE, LIQUID FILLED ORAL at 21:00

## 2024-12-18 RX ADMIN — APIXABAN 5 MG: 5 TABLET, FILM COATED ORAL at 21:00

## 2024-12-18 RX ADMIN — ERTAPENEM SODIUM 1 G: 1 INJECTION, POWDER, LYOPHILIZED, FOR SOLUTION INTRAMUSCULAR; INTRAVENOUS at 12:14

## 2024-12-18 RX ADMIN — BUDESONIDE 0.5 MG: 0.5 INHALANT ORAL at 07:07

## 2024-12-18 RX ADMIN — DOCUSATE SODIUM 100 MG: 100 CAPSULE, LIQUID FILLED ORAL at 09:28

## 2024-12-18 RX ADMIN — Medication 1 TABLET: at 21:00

## 2024-12-18 RX ADMIN — LACOSAMIDE 100 MG: 100 TABLET, FILM COATED ORAL at 09:28

## 2024-12-18 RX ADMIN — OXYCODONE HYDROCHLORIDE AND ACETAMINOPHEN 1 TABLET: 5; 325 TABLET ORAL at 09:28

## 2024-12-18 RX ADMIN — HYDROXYZINE HYDROCHLORIDE 10 MG: 10 TABLET ORAL at 22:40

## 2024-12-18 RX ADMIN — HYDROXYZINE HYDROCHLORIDE 10 MG: 10 TABLET ORAL at 04:19

## 2024-12-18 RX ADMIN — OXYCODONE HYDROCHLORIDE AND ACETAMINOPHEN 1 TABLET: 5; 325 TABLET ORAL at 21:00

## 2024-12-18 RX ADMIN — PANTOPRAZOLE SODIUM 40 MG: 40 TABLET, DELAYED RELEASE ORAL at 06:44

## 2024-12-18 RX ADMIN — BACLOFEN 5 MG: 10 TABLET ORAL at 06:44

## 2024-12-18 RX ADMIN — SERTRALINE HYDROCHLORIDE 50 MG: 50 TABLET ORAL at 09:28

## 2024-12-18 RX ADMIN — LEVETIRACETAM 750 MG: 500 TABLET, FILM COATED ORAL at 20:59

## 2024-12-18 RX ADMIN — LEVETIRACETAM 750 MG: 500 TABLET, FILM COATED ORAL at 09:28

## 2024-12-18 RX ADMIN — CYCLOBENZAPRINE 10 MG: 10 TABLET, FILM COATED ORAL at 20:59

## 2024-12-18 RX ADMIN — APIXABAN 5 MG: 5 TABLET, FILM COATED ORAL at 09:28

## 2024-12-18 RX ADMIN — METOPROLOL SUCCINATE 25 MG: 25 TABLET, EXTENDED RELEASE ORAL at 09:28

## 2024-12-18 ASSESSMENT — COGNITIVE AND FUNCTIONAL STATUS - GENERAL
TURNING FROM BACK TO SIDE WHILE IN FLAT BAD: TOTAL
STANDING UP FROM CHAIR USING ARMS: TOTAL
STANDING UP FROM CHAIR USING ARMS: TOTAL
HELP NEEDED FOR BATHING: TOTAL
TOILETING: TOTAL
MOBILITY SCORE: 7
CLIMB 3 TO 5 STEPS WITH RAILING: TOTAL
MOBILITY SCORE: 7
MOVING TO AND FROM BED TO CHAIR: TOTAL
DRESSING REGULAR UPPER BODY CLOTHING: TOTAL
DRESSING REGULAR LOWER BODY CLOTHING: TOTAL
TURNING FROM BACK TO SIDE WHILE IN FLAT BAD: TOTAL
MOVING FROM LYING ON BACK TO SITTING ON SIDE OF FLAT BED WITH BEDRAILS: A LOT
PERSONAL GROOMING: A LITTLE
WALKING IN HOSPITAL ROOM: TOTAL
HELP NEEDED FOR BATHING: TOTAL
WALKING IN HOSPITAL ROOM: TOTAL
MOVING FROM LYING ON BACK TO SITTING ON SIDE OF FLAT BED WITH BEDRAILS: A LOT
TOILETING: TOTAL
MOVING FROM LYING ON BACK TO SITTING ON SIDE OF FLAT BED WITH BEDRAILS: A LOT
PERSONAL GROOMING: A LITTLE
DAILY ACTIVITIY SCORE: 11
DAILY ACTIVITIY SCORE: 11
DRESSING REGULAR UPPER BODY CLOTHING: TOTAL
CLIMB 3 TO 5 STEPS WITH RAILING: TOTAL
MOVING TO AND FROM BED TO CHAIR: TOTAL
STANDING UP FROM CHAIR USING ARMS: TOTAL
TURNING FROM BACK TO SIDE WHILE IN FLAT BAD: TOTAL
DRESSING REGULAR LOWER BODY CLOTHING: TOTAL
HELP NEEDED FOR BATHING: TOTAL
WALKING IN HOSPITAL ROOM: TOTAL
MOVING TO AND FROM BED TO CHAIR: TOTAL
MOBILITY SCORE: 7
DRESSING REGULAR UPPER BODY CLOTHING: TOTAL
DAILY ACTIVITIY SCORE: 11
PERSONAL GROOMING: A LITTLE
TOILETING: TOTAL
CLIMB 3 TO 5 STEPS WITH RAILING: TOTAL
DRESSING REGULAR LOWER BODY CLOTHING: TOTAL

## 2024-12-18 ASSESSMENT — PAIN SCALES - GENERAL
PAINLEVEL_OUTOF10: 0 - NO PAIN
PAINLEVEL_OUTOF10: 0 - NO PAIN

## 2024-12-18 NOTE — PROGRESS NOTES
Dionte Sharpe is a 54 y.o. male on day 5 of admission presenting with Sepsis, due to unspecified organism, unspecified whether acute organ dysfunction present (Multi).      Subjective   Seen today he is ready for discharge he is hemodynamically stable he has no fever going to go back on IV antibiotics       Objective     Last Recorded Vitals  /81 (BP Location: Left arm, Patient Position: Lying)   Pulse 84   Temp 36.5 °C (97.7 °F) (Temporal)   Resp 18   Wt 97.8 kg (215 lb 9.8 oz)   SpO2 94%   Intake/Output last 3 Shifts:    Intake/Output Summary (Last 24 hours) at 12/17/2024 1936  Last data filed at 12/17/2024 1538  Gross per 24 hour   Intake 250 ml   Output 1730 ml   Net -1480 ml       Admission Weight  Weight: 97.5 kg (215 lb) (12/11/24 2354)    Daily Weight  12/14/24 : 97.8 kg (215 lb 9.8 oz)    Image Results  ECG 12 lead  Sinus tachycardia  Left axis deviation  Low voltage, precordial leads  Consider anterior infarct    Results from last 7 days   Lab Units 12/16/24  1011   WBC AUTO x10*3/uL 6.1   HEMOGLOBIN g/dL 8.8*   HEMATOCRIT % 33.5*   PLATELETS AUTO x10*3/uL 287      Results from last 7 days   Lab Units 12/16/24  1011   SODIUM mmol/L 139   POTASSIUM mmol/L 4.0   CHLORIDE mmol/L 99   CO2 mmol/L 37*   BUN mg/dL 16   CREATININE mg/dL 0.94   GLUCOSE mg/dL 197*   CALCIUM mg/dL 8.8      In the review of systems no fever no headache he is not dizzy he has had no vomiting or diarrhea    Physical Exam  Lungs are diminished but clear heart has regular rate and rhythm abdomen is soft is not distended or firm he is awake and alert and oriented x 3  Relevant Results               Assessment/Plan   This patient currently has cardiac telemetry ordered; if you would like to modify or discontinue the telemetry order, click here to go to the orders activity to modify/discontinue the order.              Assessment & Plan  Sepsis, due to unspecified organism, unspecified whether acute organ dysfunction present  (Multi)    Urinary tract infection dehydration sepsis for now he will go back on Invanz he will get that once daily for 7 days his urine cultures did not grow out any multidrug resistance however his history is multidrug resistance all the time    For now continue with the same present management his other medications I have reviewed and I will set him up for discharge tomorrow back to the facility              Eddie Rodriguez DO

## 2024-12-18 NOTE — CARE PLAN
The patient's goals for the shift include      The clinical goals for the shift include patent airway    Problem: Pain - Adult  Goal: Verbalizes/displays adequate comfort level or baseline comfort level  Outcome: Progressing     Problem: Safety - Adult  Goal: Free from fall injury  Outcome: Progressing     Problem: Discharge Planning  Goal: Discharge to home or other facility with appropriate resources  Outcome: Progressing     Problem: Chronic Conditions and Co-morbidities  Goal: Patient's chronic conditions and co-morbidity symptoms are monitored and maintained or improved  Outcome: Progressing     Problem: Skin  Goal: Decreased wound size/increased tissue granulation at next dressing change  Outcome: Progressing  Goal: Participates in plan/prevention/treatment measures  Outcome: Progressing  Goal: Prevent/manage excess moisture  Outcome: Progressing  Goal: Prevent/minimize sheer/friction injuries  Outcome: Progressing  Goal: Promote/optimize nutrition  Outcome: Progressing  Goal: Promote skin healing  Outcome: Progressing     Problem: Pain  Goal: Takes deep breaths with improved pain control throughout the shift  Outcome: Progressing  Goal: Turns in bed with improved pain control throughout the shift  Outcome: Progressing  Goal: Walks with improved pain control throughout the shift  Outcome: Progressing  Goal: Performs ADL's with improved pain control throughout shift  Outcome: Progressing  Goal: Participates in PT with improved pain control throughout the shift  Outcome: Progressing  Goal: Free from opioid side effects throughout the shift  Outcome: Progressing  Goal: Free from acute confusion related to pain meds throughout the shift  Outcome: Progressing

## 2024-12-18 NOTE — PROGRESS NOTES
Dionte Sharpe is a 54 y.o. male on day 6 of admission presenting with Sepsis, due to unspecified organism, unspecified whether acute organ dysfunction present (Multi).      Subjective   Seen today so far this is day 7 of Invanz he is done with the IV antibiotics after this pre-CERT is in process for him to go back to the [facility]       Objective     Last Recorded Vitals  /83 (BP Location: Left arm, Patient Position: Lying)   Pulse 92   Temp 36.1 °C (97 °F) (Temporal)   Resp 18   Wt 97.8 kg (215 lb 9.8 oz)   SpO2 98%   Intake/Output last 3 Shifts:    Intake/Output Summary (Last 24 hours) at 12/18/2024 1445  Last data filed at 12/18/2024 1300  Gross per 24 hour   Intake 580 ml   Output 2025 ml   Net -1445 ml       Admission Weight  Weight: 97.5 kg (215 lb) (12/11/24 2354)    Daily Weight  12/14/24 : 97.8 kg (215 lb 9.8 oz)    Image Results  XR chest 1 view  Narrative: Interpreted By:  Aliza Willingham,   STUDY:  XR CHEST 1 VIEW;  12/18/2024 2:20 pm      INDICATION:  Signs/Symptoms:increased oxygen requirement.      COMPARISON:  12/12/2024      ACCESSION NUMBER(S):  [id number]      ORDERING CLINICIAN:  ECHO ALVARENGA      FINDINGS:  CARDIOMEDIASTINAL SILHOUETTE:  The heart is enlarged.  There is a tracheostomy tube centrally positioned in the proximal  tracheal lumen.          LUNGS:  There is moderate chronic elevation of the right hemidiaphragm.  There is bandlike atelectasis or scarring at the right lung base.      ABDOMEN:  No remarkable upper abdominal findings.          BONES:  No acute osseous changes.      Impression: 1. Enlarged heart.  2. Bandlike atelectasis or scarring right lung base.      MACRO:  None      Signed by: Aliza Willingham 12/18/2024 2:26 PM  Dictation workstation:   [id number]      Physical Exam    Relevant Results               Assessment/Plan   This patient currently has cardiac telemetry ordered; if you would like to modify or discontinue the telemetry order, click here to go to  the orders activity to modify/discontinue the order.              Assessment & Plan  Sepsis, due to unspecified organism, unspecified whether acute organ dysfunction present (Multi)    For now his sepsis has resolved his urinary tract infection treated I changed his nephrostomy tubes to a new 1 he is taking in orally doing very well I encourage more fluid intake day 7 of Invanz today does not need any additional meds to go home with              Eddie Rodriguez, DO

## 2024-12-18 NOTE — CARE PLAN
The patient's goals for the shift include      The clinical goals for the shift include patent airway      Problem: Pain - Adult  Goal: Verbalizes/displays adequate comfort level or baseline comfort level  Outcome: Progressing     Problem: Safety - Adult  Goal: Free from fall injury  Outcome: Progressing     Problem: Discharge Planning  Goal: Discharge to home or other facility with appropriate resources  Outcome: Progressing     Problem: Chronic Conditions and Co-morbidities  Goal: Patient's chronic conditions and co-morbidity symptoms are monitored and maintained or improved  Outcome: Progressing     Problem: Skin  Goal: Decreased wound size/increased tissue granulation at next dressing change  12/18/2024 1106 by Mirtha Padgett RN  Flowsheets (Taken 12/18/2024 1106)  Decreased wound size/increased tissue granulation at next dressing change: Promote sleep for wound healing  12/18/2024 1105 by Mirtha Padgett RN  Outcome: Progressing  Goal: Participates in plan/prevention/treatment measures  12/18/2024 1106 by Mirtha Padgett RN  Flowsheets (Taken 12/18/2024 0244 by Elise Petersen RN)  Participates in plan/prevention/treatment measures: Elevate heels  12/18/2024 1105 by Mirtha Padgett RN  Outcome: Progressing  Goal: Prevent/manage excess moisture  12/18/2024 1106 by Mirtha Padgett RN  Flowsheets (Taken 12/18/2024 1106)  Prevent/manage excess moisture: Moisturize dry skin  12/18/2024 1105 by Mirtha Padgett RN  Outcome: Progressing  Goal: Prevent/minimize sheer/friction injuries  12/18/2024 1106 by Mirtha Padgett RN  Flowsheets (Taken 12/18/2024 1106)  Prevent/minimize sheer/friction injuries: Increase activity/out of bed for meals  12/18/2024 1105 by Mirtha Padgett RN  Outcome: Progressing  Goal: Promote/optimize nutrition  12/18/2024 1106 by Mirtha Padgett RN  Flowsheets (Taken 12/18/2024 1106)  Promote/optimize nutrition: Assist with feeding  12/18/2024 1105 by Mritha Padgett RN  Outcome: Progressing  Goal: Promote skin healing  Outcome:  Progressing     Problem: Pain  Goal: Takes deep breaths with improved pain control throughout the shift  Outcome: Progressing  Goal: Turns in bed with improved pain control throughout the shift  Outcome: Progressing  Goal: Walks with improved pain control throughout the shift  Outcome: Progressing  Goal: Performs ADL's with improved pain control throughout shift  Outcome: Progressing  Goal: Participates in PT with improved pain control throughout the shift  Outcome: Progressing  Goal: Free from opioid side effects throughout the shift  Outcome: Progressing  Goal: Free from acute confusion related to pain meds throughout the shift  Outcome: Progressing

## 2024-12-19 LAB
ATRIAL RATE: 155 BPM
P AXIS: 45 DEGREES
PR INTERVAL: 119 MS
Q ONSET: 251 MS
QRS COUNT: 25 BEATS
QRS DURATION: 108 MS
QT INTERVAL: 266 MS
QTC CALCULATION(BAZETT): 428 MS
QTC FREDERICIA: 364 MS
R AXIS: -36 DEGREES
T AXIS: 46 DEGREES
T OFFSET: 384 MS
VENTRICULAR RATE: 155 BPM

## 2024-12-19 PROCEDURE — 94640 AIRWAY INHALATION TREATMENT: CPT

## 2024-12-19 PROCEDURE — 1200000002 HC GENERAL ROOM WITH TELEMETRY DAILY

## 2024-12-19 PROCEDURE — 2500000001 HC RX 250 WO HCPCS SELF ADMINISTERED DRUGS (ALT 637 FOR MEDICARE OP): Performed by: INTERNAL MEDICINE

## 2024-12-19 PROCEDURE — 2500000004 HC RX 250 GENERAL PHARMACY W/ HCPCS (ALT 636 FOR OP/ED): Performed by: INTERNAL MEDICINE

## 2024-12-19 PROCEDURE — 2500000002 HC RX 250 W HCPCS SELF ADMINISTERED DRUGS (ALT 637 FOR MEDICARE OP, ALT 636 FOR OP/ED)

## 2024-12-19 PROCEDURE — 2500000005 HC RX 250 GENERAL PHARMACY W/O HCPCS: Performed by: INTERNAL MEDICINE

## 2024-12-19 PROCEDURE — 2500000001 HC RX 250 WO HCPCS SELF ADMINISTERED DRUGS (ALT 637 FOR MEDICARE OP): Performed by: PHYSICIAN ASSISTANT

## 2024-12-19 PROCEDURE — 2500000001 HC RX 250 WO HCPCS SELF ADMINISTERED DRUGS (ALT 637 FOR MEDICARE OP)

## 2024-12-19 RX ADMIN — Medication 30 L/MIN: at 08:44

## 2024-12-19 RX ADMIN — DOCUSATE SODIUM 100 MG: 100 CAPSULE, LIQUID FILLED ORAL at 21:16

## 2024-12-19 RX ADMIN — BACLOFEN 5 MG: 10 TABLET ORAL at 16:45

## 2024-12-19 RX ADMIN — APIXABAN 5 MG: 5 TABLET, FILM COATED ORAL at 08:32

## 2024-12-19 RX ADMIN — DOCUSATE SODIUM 100 MG: 100 CAPSULE, LIQUID FILLED ORAL at 08:32

## 2024-12-19 RX ADMIN — SERTRALINE HYDROCHLORIDE 50 MG: 50 TABLET ORAL at 08:32

## 2024-12-19 RX ADMIN — CYCLOBENZAPRINE 10 MG: 10 TABLET, FILM COATED ORAL at 08:32

## 2024-12-19 RX ADMIN — PANTOPRAZOLE SODIUM 40 MG: 40 TABLET, DELAYED RELEASE ORAL at 06:23

## 2024-12-19 RX ADMIN — BUDESONIDE 0.5 MG: 0.5 INHALANT ORAL at 19:10

## 2024-12-19 RX ADMIN — APIXABAN 5 MG: 5 TABLET, FILM COATED ORAL at 21:17

## 2024-12-19 RX ADMIN — ERTAPENEM SODIUM 1 G: 1 INJECTION, POWDER, LYOPHILIZED, FOR SOLUTION INTRAMUSCULAR; INTRAVENOUS at 08:35

## 2024-12-19 RX ADMIN — FOLIC ACID 1 MG: 1 TABLET ORAL at 08:32

## 2024-12-19 RX ADMIN — HYDROXYZINE HYDROCHLORIDE 10 MG: 10 TABLET ORAL at 21:14

## 2024-12-19 RX ADMIN — METOPROLOL SUCCINATE 25 MG: 25 TABLET, EXTENDED RELEASE ORAL at 08:33

## 2024-12-19 RX ADMIN — LEVETIRACETAM 750 MG: 500 TABLET, FILM COATED ORAL at 21:19

## 2024-12-19 RX ADMIN — Medication 1 TABLET: at 08:33

## 2024-12-19 RX ADMIN — LACOSAMIDE 100 MG: 100 TABLET, FILM COATED ORAL at 21:16

## 2024-12-19 RX ADMIN — LACOSAMIDE 100 MG: 100 TABLET, FILM COATED ORAL at 08:32

## 2024-12-19 RX ADMIN — BACLOFEN 5 MG: 10 TABLET ORAL at 06:23

## 2024-12-19 RX ADMIN — LEVETIRACETAM 750 MG: 500 TABLET, FILM COATED ORAL at 08:32

## 2024-12-19 RX ADMIN — OXYCODONE HYDROCHLORIDE AND ACETAMINOPHEN 1 TABLET: 5; 325 TABLET ORAL at 21:14

## 2024-12-19 RX ADMIN — OXYCODONE HYDROCHLORIDE AND ACETAMINOPHEN 1 TABLET: 5; 325 TABLET ORAL at 08:33

## 2024-12-19 RX ADMIN — Medication 1 TABLET: at 21:15

## 2024-12-19 RX ADMIN — BACLOFEN 5 MG: 10 TABLET ORAL at 11:47

## 2024-12-19 RX ADMIN — CYCLOBENZAPRINE 10 MG: 10 TABLET, FILM COATED ORAL at 21:17

## 2024-12-19 RX ADMIN — Medication 40 L/MIN: at 21:20

## 2024-12-19 RX ADMIN — Medication 2 APPLICATION: at 21:19

## 2024-12-19 RX ADMIN — BACLOFEN 5 MG: 10 TABLET ORAL at 21:15

## 2024-12-19 ASSESSMENT — COGNITIVE AND FUNCTIONAL STATUS - GENERAL
HELP NEEDED FOR BATHING: TOTAL
WALKING IN HOSPITAL ROOM: TOTAL
DRESSING REGULAR UPPER BODY CLOTHING: TOTAL
DRESSING REGULAR LOWER BODY CLOTHING: TOTAL
MOVING FROM LYING ON BACK TO SITTING ON SIDE OF FLAT BED WITH BEDRAILS: A LOT
TURNING FROM BACK TO SIDE WHILE IN FLAT BAD: TOTAL
STANDING UP FROM CHAIR USING ARMS: TOTAL
PERSONAL GROOMING: A LITTLE
TOILETING: TOTAL
MOBILITY SCORE: 7
DAILY ACTIVITIY SCORE: 11
CLIMB 3 TO 5 STEPS WITH RAILING: TOTAL
MOVING TO AND FROM BED TO CHAIR: TOTAL

## 2024-12-19 ASSESSMENT — PAIN SCALES - GENERAL
PAINLEVEL_OUTOF10: 0 - NO PAIN
PAINLEVEL_OUTOF10: 0 - NO PAIN
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
## 2024-12-19 NOTE — PROGRESS NOTES
"Infectious disease progress note  Subjective   Sepsis resolved  Left pyelonephritis/UTI no growth  Leukocytosis resolved       Antibiotics  Antibiotic day 8 out of 10 days  Invanz day 3    Objective   Range of Vitals (last 24 hours)  Heart Rate:  [76-92]   Temp:  [35.6 °C (96.1 °F)-36.5 °C (97.7 °F)]   Resp:  [18]   BP: (128-161)/(76-95)   SpO2:  [93 %-100 %]   Daily Weight  12/14/24 : 97.8 kg (215 lb 9.8 oz)    Body mass index is 30.19 kg/m².      Physical Exam  Patient's nephrostomy was changed out on Friday on this admission  HEENT PERRLA  Chest entry bilaterally  CVS S1-S2 regular  Abdomen is soft colostomy in place  Nephrostomy tube in place  Extremities wasted    Relevant Results  Labs  Lab Results   Component Value Date    WBC 6.1 12/16/2024    HGB 8.8 (L) 12/16/2024    HCT 33.5 (L) 12/16/2024    MCV 73 (L) 12/16/2024     12/16/2024     Lab Results   Component Value Date    GLUCOSE 197 (H) 12/16/2024    CALCIUM 8.8 12/16/2024     12/16/2024    K 4.0 12/16/2024    CO2 37 (H) 12/16/2024    CL 99 12/16/2024    BUN 16 12/16/2024    CREATININE 0.94 12/16/2024   ESR: --No results found for: \"SEDRATE\"No results found for: \"CRP\"  Lab Results   Component Value Date    ALT 16 12/14/2024    AST 13 12/14/2024    ALKPHOS 62 12/14/2024    BILITOT 0.3 12/14/2024       Microbiology  12- blood culture no growth 3 days  12- urine culture mixed matti     Imaging  12- CT abdomen pelvis shows left-sided nephrostomy tube with soft tissue stranding at the bilateral renal pelvis ease        Assessment/Plan   1.  Continue Invanz for 2 more days  2.  ECF orders were printed and placed on chart    Other issues  Cerebral palsy  Kidney stones  Neurogenic bladder      I reviewed and interpreted all lab test imaging studies and documentations from other healthcare providers  I am monitoring for antibiotic side effects and toxicity     Hansa Sutherland MD  "

## 2024-12-19 NOTE — CARE PLAN
The patient's goals for the shift include  Rest    The clinical goals for the shift include Maintain Safety      Problem: Pain - Adult  Goal: Verbalizes/displays adequate comfort level or baseline comfort level  Outcome: Progressing     Problem: Safety - Adult  Goal: Free from fall injury  Outcome: Progressing     Problem: Discharge Planning  Goal: Discharge to home or other facility with appropriate resources  Outcome: Progressing     Problem: Chronic Conditions and Co-morbidities  Goal: Patient's chronic conditions and co-morbidity symptoms are monitored and maintained or improved  Outcome: Progressing     Problem: Skin  Goal: Decreased wound size/increased tissue granulation at next dressing change  Outcome: Progressing  Flowsheets (Taken 12/19/2024 0058)  Decreased wound size/increased tissue granulation at next dressing change: Promote sleep for wound healing  Goal: Participates in plan/prevention/treatment measures  Outcome: Progressing  Flowsheets (Taken 12/19/2024 0058)  Participates in plan/prevention/treatment measures: Elevate heels  Goal: Prevent/manage excess moisture  Outcome: Progressing  Flowsheets (Taken 12/19/2024 0058)  Prevent/manage excess moisture: Moisturize dry skin  Goal: Prevent/minimize sheer/friction injuries  Outcome: Progressing  Flowsheets (Taken 12/19/2024 0058)  Prevent/minimize sheer/friction injuries: Turn/reposition every 2 hours/use positioning/transfer devices  Goal: Promote/optimize nutrition  Outcome: Progressing  Flowsheets (Taken 12/19/2024 0058)  Promote/optimize nutrition: Offer water/supplements/favorite foods  Goal: Promote skin healing  Outcome: Progressing  Flowsheets (Taken 12/19/2024 0058)  Promote skin healing: Turn/reposition every 2 hours/use positioning/transfer devices     Problem: Pain  Goal: Takes deep breaths with improved pain control throughout the shift  Outcome: Progressing  Goal: Turns in bed with improved pain control throughout the shift  Outcome:  Progressing  Goal: Walks with improved pain control throughout the shift  Outcome: Progressing  Goal: Performs ADL's with improved pain control throughout shift  Outcome: Progressing  Goal: Participates in PT with improved pain control throughout the shift  Outcome: Progressing  Goal: Free from opioid side effects throughout the shift  Outcome: Progressing  Goal: Free from acute confusion related to pain meds throughout the shift  Outcome: Progressing

## 2024-12-19 NOTE — CARE PLAN
The patient's goals for the shift include      The clinical goals for the shift include Maintain Safety    Problem: Pain - Adult  Goal: Verbalizes/displays adequate comfort level or baseline comfort level  Outcome: Progressing     Problem: Safety - Adult  Goal: Free from fall injury  Outcome: Progressing     Problem: Discharge Planning  Goal: Discharge to home or other facility with appropriate resources  Outcome: Progressing     Problem: Chronic Conditions and Co-morbidities  Goal: Patient's chronic conditions and co-morbidity symptoms are monitored and maintained or improved  Outcome: Progressing     Problem: Skin  Goal: Decreased wound size/increased tissue granulation at next dressing change  Outcome: Progressing  Goal: Participates in plan/prevention/treatment measures  Outcome: Progressing  Goal: Prevent/manage excess moisture  Outcome: Progressing  Goal: Prevent/minimize sheer/friction injuries  Outcome: Progressing  Goal: Promote/optimize nutrition  Outcome: Progressing  Goal: Promote skin healing  Outcome: Progressing     Problem: Pain  Goal: Takes deep breaths with improved pain control throughout the shift  Outcome: Progressing  Goal: Turns in bed with improved pain control throughout the shift  Outcome: Progressing  Goal: Walks with improved pain control throughout the shift  Outcome: Progressing  Goal: Performs ADL's with improved pain control throughout shift  Outcome: Progressing  Goal: Participates in PT with improved pain control throughout the shift  Outcome: Progressing  Goal: Free from opioid side effects throughout the shift  Outcome: Progressing  Goal: Free from acute confusion related to pain meds throughout the shift  Outcome: Progressing

## 2024-12-20 VITALS
TEMPERATURE: 98.4 F | HEIGHT: 71 IN | HEART RATE: 89 BPM | DIASTOLIC BLOOD PRESSURE: 67 MMHG | SYSTOLIC BLOOD PRESSURE: 123 MMHG | WEIGHT: 215.61 LBS | RESPIRATION RATE: 18 BRPM | OXYGEN SATURATION: 95 % | BODY MASS INDEX: 30.19 KG/M2

## 2024-12-20 PROCEDURE — 2500000001 HC RX 250 WO HCPCS SELF ADMINISTERED DRUGS (ALT 637 FOR MEDICARE OP): Performed by: INTERNAL MEDICINE

## 2024-12-20 PROCEDURE — 94640 AIRWAY INHALATION TREATMENT: CPT

## 2024-12-20 PROCEDURE — 2500000002 HC RX 250 W HCPCS SELF ADMINISTERED DRUGS (ALT 637 FOR MEDICARE OP, ALT 636 FOR OP/ED)

## 2024-12-20 PROCEDURE — 2500000005 HC RX 250 GENERAL PHARMACY W/O HCPCS: Performed by: INTERNAL MEDICINE

## 2024-12-20 PROCEDURE — 2500000001 HC RX 250 WO HCPCS SELF ADMINISTERED DRUGS (ALT 637 FOR MEDICARE OP)

## 2024-12-20 PROCEDURE — 2500000002 HC RX 250 W HCPCS SELF ADMINISTERED DRUGS (ALT 637 FOR MEDICARE OP, ALT 636 FOR OP/ED): Performed by: INTERNAL MEDICINE

## 2024-12-20 PROCEDURE — 2500000004 HC RX 250 GENERAL PHARMACY W/ HCPCS (ALT 636 FOR OP/ED): Performed by: INTERNAL MEDICINE

## 2024-12-20 RX ADMIN — OXYCODONE HYDROCHLORIDE AND ACETAMINOPHEN 1 TABLET: 5; 325 TABLET ORAL at 09:17

## 2024-12-20 RX ADMIN — METOPROLOL SUCCINATE 25 MG: 25 TABLET, EXTENDED RELEASE ORAL at 09:17

## 2024-12-20 RX ADMIN — LEVETIRACETAM 750 MG: 500 TABLET, FILM COATED ORAL at 09:17

## 2024-12-20 RX ADMIN — Medication 40 PERCENT: at 06:41

## 2024-12-20 RX ADMIN — FOLIC ACID 1 MG: 1 TABLET ORAL at 09:17

## 2024-12-20 RX ADMIN — BACLOFEN 5 MG: 10 TABLET ORAL at 06:16

## 2024-12-20 RX ADMIN — LACOSAMIDE 100 MG: 100 TABLET, FILM COATED ORAL at 09:17

## 2024-12-20 RX ADMIN — DOCUSATE SODIUM 100 MG: 100 CAPSULE, LIQUID FILLED ORAL at 09:17

## 2024-12-20 RX ADMIN — SERTRALINE HYDROCHLORIDE 50 MG: 50 TABLET ORAL at 09:17

## 2024-12-20 RX ADMIN — APIXABAN 5 MG: 5 TABLET, FILM COATED ORAL at 09:17

## 2024-12-20 RX ADMIN — Medication 1 TABLET: at 09:17

## 2024-12-20 RX ADMIN — PANTOPRAZOLE SODIUM 40 MG: 40 TABLET, DELAYED RELEASE ORAL at 06:16

## 2024-12-20 RX ADMIN — ALBUTEROL SULFATE 2.5 MG: 2.5 SOLUTION RESPIRATORY (INHALATION) at 06:37

## 2024-12-20 RX ADMIN — BACLOFEN 5 MG: 10 TABLET ORAL at 11:25

## 2024-12-20 RX ADMIN — CYCLOBENZAPRINE 10 MG: 10 TABLET, FILM COATED ORAL at 09:17

## 2024-12-20 RX ADMIN — BUDESONIDE 0.5 MG: 0.5 INHALANT ORAL at 06:37

## 2024-12-20 RX ADMIN — ERTAPENEM SODIUM 1 G: 1 INJECTION, POWDER, LYOPHILIZED, FOR SOLUTION INTRAMUSCULAR; INTRAVENOUS at 09:17

## 2024-12-20 ASSESSMENT — COGNITIVE AND FUNCTIONAL STATUS - GENERAL
DAILY ACTIVITIY SCORE: 11
MOVING FROM LYING ON BACK TO SITTING ON SIDE OF FLAT BED WITH BEDRAILS: A LOT
PERSONAL GROOMING: A LITTLE
MOVING TO AND FROM BED TO CHAIR: TOTAL
TOILETING: TOTAL
WALKING IN HOSPITAL ROOM: TOTAL
STANDING UP FROM CHAIR USING ARMS: TOTAL
MOBILITY SCORE: 7
DRESSING REGULAR UPPER BODY CLOTHING: TOTAL
HELP NEEDED FOR BATHING: TOTAL
TURNING FROM BACK TO SIDE WHILE IN FLAT BAD: TOTAL
DRESSING REGULAR LOWER BODY CLOTHING: TOTAL
CLIMB 3 TO 5 STEPS WITH RAILING: TOTAL

## 2024-12-20 ASSESSMENT — PAIN SCALES - GENERAL: PAINLEVEL_OUTOF10: 0 - NO PAIN

## 2024-12-20 ASSESSMENT — PAIN - FUNCTIONAL ASSESSMENT: PAIN_FUNCTIONAL_ASSESSMENT: 0-10

## 2024-12-20 NOTE — PROGRESS NOTES
Pt will be discharging today and transport is set up for 1345, pt is aware.  Lyric Collins RN [facility]

## 2024-12-20 NOTE — CARE PLAN
Problem: Pain - Adult  Goal: Verbalizes/displays adequate comfort level or baseline comfort level  Outcome: Progressing     Problem: Safety - Adult  Goal: Free from fall injury  Outcome: Progressing     Problem: Discharge Planning  Goal: Discharge to home or other facility with appropriate resources  Outcome: Progressing     Problem: Chronic Conditions and Co-morbidities  Goal: Patient's chronic conditions and co-morbidity symptoms are monitored and maintained or improved  Outcome: Progressing     Problem: Skin  Goal: Decreased wound size/increased tissue granulation at next dressing change  Outcome: Progressing  Flowsheets (Taken 12/20/2024 1113)  Decreased wound size/increased tissue granulation at next dressing change: Promote sleep for wound healing  Goal: Participates in plan/prevention/treatment measures  Outcome: Progressing  Flowsheets (Taken 12/20/2024 1113)  Participates in plan/prevention/treatment measures: Elevate heels  Goal: Prevent/manage excess moisture  Outcome: Progressing  Flowsheets (Taken 12/20/2024 1113)  Prevent/manage excess moisture: Moisturize dry skin  Goal: Prevent/minimize sheer/friction injuries  Outcome: Progressing  Flowsheets (Taken 12/20/2024 1113)  Prevent/minimize sheer/friction injuries: Use pull sheet  Goal: Promote/optimize nutrition  Outcome: Progressing  Flowsheets (Taken 12/20/2024 1113)  Promote/optimize nutrition: Consume > 50% meals/supplements  Goal: Promote skin healing  Outcome: Progressing  Flowsheets (Taken 12/20/2024 1113)  Promote skin healing: Protective dressings over bony prominences     Problem: Pain  Goal: Takes deep breaths with improved pain control throughout the shift  Outcome: Progressing  Goal: Turns in bed with improved pain control throughout the shift  Outcome: Progressing  Goal: Walks with improved pain control throughout the shift  Outcome: Progressing  Goal: Performs ADL's with improved pain control throughout shift  Outcome: Progressing  Goal:  Participates in PT with improved pain control throughout the shift  Outcome: Progressing  Goal: Free from opioid side effects throughout the shift  Outcome: Progressing  Goal: Free from acute confusion related to pain meds throughout the shift  Outcome: Progressing   The patient's goals for the shift include      The clinical goals for the shift include pulse ox>92% this shift    Over the shift, the patient did not make progress toward the following goals. Barriers to progression include none. Recommendations to address these barriers include n/a.

## 2024-12-20 NOTE — CARE PLAN
The patient's goals for the shift include      The clinical goals for the shift include pulse ox>92% this shift    Over the shift, the patient did not make progress toward the following goals. Barriers to progression include ***. Recommendations to address these barriers include ***.
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
## 2025-07-01 ENCOUNTER — APPOINTMENT (OUTPATIENT)
Dept: RADIOLOGY | Facility: HOSPITAL | Age: 55
End: 2025-07-01
Payer: COMMERCIAL

## 2025-07-01 ENCOUNTER — LAB REQUISITION (OUTPATIENT)
Dept: LAB | Facility: HOSPITAL | Age: 55
End: 2025-07-01
Payer: COMMERCIAL

## 2025-07-01 ENCOUNTER — HOSPITAL ENCOUNTER (INPATIENT)
Facility: HOSPITAL | Age: 55
LOS: 3 days | Discharge: NURSING, REHAB OR LONG-TERM CARE | End: 2025-07-05
Attending: EMERGENCY MEDICINE | Admitting: INTERNAL MEDICINE
Payer: COMMERCIAL

## 2025-07-01 DIAGNOSIS — T83.098A MALFUNCTION OF NEPHROSTOMY TUBE: Primary | ICD-10-CM

## 2025-07-01 DIAGNOSIS — N20.0 KIDNEY STONE ON LEFT SIDE: ICD-10-CM

## 2025-07-01 DIAGNOSIS — R53.1 WEAKNESS: ICD-10-CM

## 2025-07-01 DIAGNOSIS — N39.0 ACUTE UTI: ICD-10-CM

## 2025-07-01 LAB
ALBUMIN SERPL BCP-MCNC: 4.2 G/DL (ref 3.4–5)
ALP SERPL-CCNC: 82 U/L (ref 33–120)
ALT SERPL W P-5'-P-CCNC: 20 U/L (ref 10–52)
ANION GAP SERPL CALC-SCNC: 11 MMOL/L (ref 10–20)
ANION GAP SERPL CALC-SCNC: 11 MMOL/L (ref 10–20)
APPEARANCE UR: ABNORMAL
AST SERPL W P-5'-P-CCNC: 18 U/L (ref 9–39)
BACTERIA #/AREA URNS AUTO: ABNORMAL /HPF
BASOPHILS # BLD AUTO: 0.09 X10*3/UL (ref 0–0.1)
BASOPHILS NFR BLD AUTO: 0.7 %
BILIRUB SERPL-MCNC: 0.3 MG/DL (ref 0–1.2)
BILIRUB UR STRIP.AUTO-MCNC: NEGATIVE MG/DL
BUN SERPL-MCNC: 19 MG/DL (ref 6–23)
BUN SERPL-MCNC: 20 MG/DL (ref 6–23)
CALCIUM SERPL-MCNC: 8.8 MG/DL (ref 8.6–10.3)
CALCIUM SERPL-MCNC: 8.9 MG/DL (ref 8.6–10.3)
CHLORIDE SERPL-SCNC: 100 MMOL/L (ref 98–107)
CHLORIDE SERPL-SCNC: 97 MMOL/L (ref 98–107)
CO2 SERPL-SCNC: 31 MMOL/L (ref 21–32)
CO2 SERPL-SCNC: 35 MMOL/L (ref 21–32)
COLOR UR: ABNORMAL
CREAT SERPL-MCNC: 0.97 MG/DL (ref 0.5–1.3)
CREAT SERPL-MCNC: 1.07 MG/DL (ref 0.5–1.3)
EGFRCR SERPLBLD CKD-EPI 2021: 83 ML/MIN/1.73M*2
EGFRCR SERPLBLD CKD-EPI 2021: >90 ML/MIN/1.73M*2
EOSINOPHIL # BLD AUTO: 0.58 X10*3/UL (ref 0–0.7)
EOSINOPHIL NFR BLD AUTO: 4.6 %
ERYTHROCYTE [DISTWIDTH] IN BLOOD BY AUTOMATED COUNT: 20.2 % (ref 11.5–14.5)
ERYTHROCYTE [DISTWIDTH] IN BLOOD BY AUTOMATED COUNT: 20.5 % (ref 11.5–14.5)
GLUCOSE SERPL-MCNC: 141 MG/DL (ref 74–99)
GLUCOSE SERPL-MCNC: 167 MG/DL (ref 74–99)
GLUCOSE UR STRIP.AUTO-MCNC: NORMAL MG/DL
HCT VFR BLD AUTO: 40.8 % (ref 41–52)
HCT VFR BLD AUTO: 42.9 % (ref 41–52)
HGB BLD-MCNC: 10 G/DL (ref 13.5–17.5)
HGB BLD-MCNC: 10.8 G/DL (ref 13.5–17.5)
IMM GRANULOCYTES # BLD AUTO: 0.06 X10*3/UL (ref 0–0.7)
IMM GRANULOCYTES NFR BLD AUTO: 0.5 % (ref 0–0.9)
KETONES UR STRIP.AUTO-MCNC: NEGATIVE MG/DL
LEUKOCYTE ESTERASE UR QL STRIP.AUTO: ABNORMAL
LYMPHOCYTES # BLD AUTO: 1.82 X10*3/UL (ref 1.2–4.8)
LYMPHOCYTES NFR BLD AUTO: 14.4 %
MAGNESIUM SERPL-MCNC: 1.71 MG/DL (ref 1.6–2.4)
MCH RBC QN AUTO: 18.2 PG (ref 26–34)
MCH RBC QN AUTO: 18.4 PG (ref 26–34)
MCHC RBC AUTO-ENTMCNC: 24.5 G/DL (ref 32–36)
MCHC RBC AUTO-ENTMCNC: 25.2 G/DL (ref 32–36)
MCV RBC AUTO: 73 FL (ref 80–100)
MCV RBC AUTO: 74 FL (ref 80–100)
MONOCYTES # BLD AUTO: 0.87 X10*3/UL (ref 0.1–1)
MONOCYTES NFR BLD AUTO: 6.9 %
NEUTROPHILS # BLD AUTO: 9.2 X10*3/UL (ref 1.2–7.7)
NEUTROPHILS NFR BLD AUTO: 72.9 %
NITRITE UR QL STRIP.AUTO: NEGATIVE
NRBC BLD-RTO: 0 /100 WBCS (ref 0–0)
NRBC BLD-RTO: 0.2 /100 WBCS (ref 0–0)
PH UR STRIP.AUTO: 7 [PH]
PLATELET # BLD AUTO: 354 X10*3/UL (ref 150–450)
PLATELET # BLD AUTO: 355 X10*3/UL (ref 150–450)
POTASSIUM SERPL-SCNC: 4.1 MMOL/L (ref 3.5–5.3)
POTASSIUM SERPL-SCNC: 4.2 MMOL/L (ref 3.5–5.3)
PROT SERPL-MCNC: 7.4 G/DL (ref 6.4–8.2)
PROT UR STRIP.AUTO-MCNC: ABNORMAL MG/DL
RBC # BLD AUTO: 5.5 X10*6/UL (ref 4.5–5.9)
RBC # BLD AUTO: 5.86 X10*6/UL (ref 4.5–5.9)
RBC # UR STRIP.AUTO: ABNORMAL MG/DL
RBC #/AREA URNS AUTO: >20 /HPF
SODIUM SERPL-SCNC: 138 MMOL/L (ref 136–145)
SODIUM SERPL-SCNC: 139 MMOL/L (ref 136–145)
SP GR UR STRIP.AUTO: 1.01
UROBILINOGEN UR STRIP.AUTO-MCNC: NORMAL MG/DL
WBC # BLD AUTO: 10.1 X10*3/UL (ref 4.4–11.3)
WBC # BLD AUTO: 12.6 X10*3/UL (ref 4.4–11.3)
WBC #/AREA URNS AUTO: >50 /HPF
WBC CLUMPS #/AREA URNS AUTO: ABNORMAL /HPF
YEAST BUDDING #/AREA UR COMP ASSIST: PRESENT /HPF

## 2025-07-01 PROCEDURE — 80053 COMPREHEN METABOLIC PANEL: CPT | Performed by: EMERGENCY MEDICINE

## 2025-07-01 PROCEDURE — G0378 HOSPITAL OBSERVATION PER HR: HCPCS

## 2025-07-01 PROCEDURE — 81001 URINALYSIS AUTO W/SCOPE: CPT | Performed by: EMERGENCY MEDICINE

## 2025-07-01 PROCEDURE — 85025 COMPLETE CBC W/AUTO DIFF WBC: CPT | Performed by: EMERGENCY MEDICINE

## 2025-07-01 PROCEDURE — 71250 CT THORAX DX C-: CPT | Mod: FOREIGN READ | Performed by: RADIOLOGY

## 2025-07-01 PROCEDURE — 85027 COMPLETE CBC AUTOMATED: CPT | Mod: OUT | Performed by: INTERNAL MEDICINE

## 2025-07-01 PROCEDURE — 2500000005 HC RX 250 GENERAL PHARMACY W/O HCPCS: Performed by: INTERNAL MEDICINE

## 2025-07-01 PROCEDURE — 74176 CT ABD & PELVIS W/O CONTRAST: CPT

## 2025-07-01 PROCEDURE — 82565 ASSAY OF CREATININE: CPT | Mod: OUT | Performed by: INTERNAL MEDICINE

## 2025-07-01 PROCEDURE — 99223 1ST HOSP IP/OBS HIGH 75: CPT

## 2025-07-01 PROCEDURE — 99285 EMERGENCY DEPT VISIT HI MDM: CPT | Mod: 25 | Performed by: EMERGENCY MEDICINE

## 2025-07-01 PROCEDURE — 74176 CT ABD & PELVIS W/O CONTRAST: CPT | Mod: FOREIGN READ | Performed by: RADIOLOGY

## 2025-07-01 PROCEDURE — 71250 CT THORAX DX C-: CPT

## 2025-07-01 PROCEDURE — 36415 COLL VENOUS BLD VENIPUNCTURE: CPT | Mod: OUT | Performed by: INTERNAL MEDICINE

## 2025-07-01 PROCEDURE — 36415 COLL VENOUS BLD VENIPUNCTURE: CPT | Performed by: EMERGENCY MEDICINE

## 2025-07-01 PROCEDURE — 83735 ASSAY OF MAGNESIUM: CPT | Performed by: EMERGENCY MEDICINE

## 2025-07-01 PROCEDURE — 87086 URINE CULTURE/COLONY COUNT: CPT | Mod: PARLAB | Performed by: EMERGENCY MEDICINE

## 2025-07-01 PROCEDURE — 99418 PROLNG IP/OBS E/M EA 15 MIN: CPT

## 2025-07-01 RX ADMIN — Medication 30 L/MIN: at 23:15

## 2025-07-01 ASSESSMENT — LIFESTYLE VARIABLES
HAVE PEOPLE ANNOYED YOU BY CRITICIZING YOUR DRINKING: NO
EVER HAD A DRINK FIRST THING IN THE MORNING TO STEADY YOUR NERVES TO GET RID OF A HANGOVER: NO
TOTAL SCORE: 0
HAVE YOU EVER FELT YOU SHOULD CUT DOWN ON YOUR DRINKING: NO
EVER FELT BAD OR GUILTY ABOUT YOUR DRINKING: NO
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
## 2025-07-01 NOTE — ED TRIAGE NOTES
Pt. Arrives from facility with the complaint of a nephrostomy tube not producing output for the last two days. Pt. Has a minor complaint of left flank pain. Pt. Denies SOB, Chest pain, N/V.

## 2025-07-02 ENCOUNTER — APPOINTMENT (OUTPATIENT)
Dept: RADIOLOGY | Facility: HOSPITAL | Age: 55
End: 2025-07-02
Payer: COMMERCIAL

## 2025-07-02 LAB
BACTERIA UR CULT: NORMAL
ERYTHROCYTE [DISTWIDTH] IN BLOOD BY AUTOMATED COUNT: 20.3 % (ref 11.5–14.5)
GLUCOSE BLD MANUAL STRIP-MCNC: 146 MG/DL (ref 74–99)
GLUCOSE BLD MANUAL STRIP-MCNC: 153 MG/DL (ref 74–99)
GLUCOSE BLD MANUAL STRIP-MCNC: 190 MG/DL (ref 74–99)
GLUCOSE BLD MANUAL STRIP-MCNC: 205 MG/DL (ref 74–99)
HCT VFR BLD AUTO: 38.6 % (ref 41–52)
HGB BLD-MCNC: 9.5 G/DL (ref 13.5–17.5)
HOLD SPECIMEN: NORMAL
MCH RBC QN AUTO: 18.2 PG (ref 26–34)
MCHC RBC AUTO-ENTMCNC: 24.6 G/DL (ref 32–36)
MCV RBC AUTO: 74 FL (ref 80–100)
NRBC BLD-RTO: 0 /100 WBCS (ref 0–0)
PLATELET # BLD AUTO: 331 X10*3/UL (ref 150–450)
RBC # BLD AUTO: 5.23 X10*6/UL (ref 4.5–5.9)
WBC # BLD AUTO: 12.6 X10*3/UL (ref 4.4–11.3)

## 2025-07-02 PROCEDURE — 96365 THER/PROPH/DIAG IV INF INIT: CPT

## 2025-07-02 PROCEDURE — 96366 THER/PROPH/DIAG IV INF ADDON: CPT

## 2025-07-02 PROCEDURE — 85027 COMPLETE CBC AUTOMATED: CPT

## 2025-07-02 PROCEDURE — 1200000002 HC GENERAL ROOM WITH TELEMETRY DAILY

## 2025-07-02 PROCEDURE — 2500000004 HC RX 250 GENERAL PHARMACY W/ HCPCS (ALT 636 FOR OP/ED): Mod: JZ | Performed by: EMERGENCY MEDICINE

## 2025-07-02 PROCEDURE — 2500000001 HC RX 250 WO HCPCS SELF ADMINISTERED DRUGS (ALT 637 FOR MEDICARE OP)

## 2025-07-02 PROCEDURE — 36415 COLL VENOUS BLD VENIPUNCTURE: CPT

## 2025-07-02 PROCEDURE — 2500000005 HC RX 250 GENERAL PHARMACY W/O HCPCS: Performed by: INTERNAL MEDICINE

## 2025-07-02 PROCEDURE — 82947 ASSAY GLUCOSE BLOOD QUANT: CPT

## 2025-07-02 PROCEDURE — 2500000004 HC RX 250 GENERAL PHARMACY W/ HCPCS (ALT 636 FOR OP/ED): Mod: JZ

## 2025-07-02 PROCEDURE — 2500000001 HC RX 250 WO HCPCS SELF ADMINISTERED DRUGS (ALT 637 FOR MEDICARE OP): Performed by: INTERNAL MEDICINE

## 2025-07-02 RX ORDER — DEXTROSE 50 % IN WATER (D50W) INTRAVENOUS SYRINGE
12.5
Status: DISCONTINUED | OUTPATIENT
Start: 2025-07-02 | End: 2025-07-05 | Stop reason: HOSPADM

## 2025-07-02 RX ORDER — ACETAMINOPHEN 650 MG/1
650 SUPPOSITORY RECTAL EVERY 4 HOURS PRN
Status: DISCONTINUED | OUTPATIENT
Start: 2025-07-02 | End: 2025-07-05 | Stop reason: HOSPADM

## 2025-07-02 RX ORDER — TRAMADOL HYDROCHLORIDE 50 MG/1
50 TABLET, FILM COATED ORAL EVERY 6 HOURS PRN
Status: DISCONTINUED | OUTPATIENT
Start: 2025-07-02 | End: 2025-07-03

## 2025-07-02 RX ORDER — LEVETIRACETAM 500 MG/1
750 TABLET ORAL 2 TIMES DAILY
Status: DISCONTINUED | OUTPATIENT
Start: 2025-07-02 | End: 2025-07-05 | Stop reason: HOSPADM

## 2025-07-02 RX ORDER — ONDANSETRON HYDROCHLORIDE 2 MG/ML
4 INJECTION, SOLUTION INTRAVENOUS EVERY 8 HOURS PRN
Status: DISCONTINUED | OUTPATIENT
Start: 2025-07-02 | End: 2025-07-05 | Stop reason: HOSPADM

## 2025-07-02 RX ORDER — DEXTROSE 50 % IN WATER (D50W) INTRAVENOUS SYRINGE
25
Status: DISCONTINUED | OUTPATIENT
Start: 2025-07-02 | End: 2025-07-05 | Stop reason: HOSPADM

## 2025-07-02 RX ORDER — CYCLOBENZAPRINE HCL 10 MG
10 TABLET ORAL 2 TIMES DAILY
Status: DISCONTINUED | OUTPATIENT
Start: 2025-07-02 | End: 2025-07-05 | Stop reason: HOSPADM

## 2025-07-02 RX ORDER — ONDANSETRON 4 MG/1
4 TABLET, FILM COATED ORAL EVERY 8 HOURS PRN
Status: DISCONTINUED | OUTPATIENT
Start: 2025-07-02 | End: 2025-07-05 | Stop reason: HOSPADM

## 2025-07-02 RX ORDER — ACETAMINOPHEN 160 MG/5ML
650 SOLUTION ORAL EVERY 4 HOURS PRN
Status: DISCONTINUED | OUTPATIENT
Start: 2025-07-02 | End: 2025-07-05 | Stop reason: HOSPADM

## 2025-07-02 RX ORDER — BACLOFEN 10 MG/1
5 TABLET ORAL 4 TIMES DAILY
Status: DISCONTINUED | OUTPATIENT
Start: 2025-07-02 | End: 2025-07-05 | Stop reason: HOSPADM

## 2025-07-02 RX ORDER — ACETAMINOPHEN 325 MG/1
650 TABLET ORAL EVERY 4 HOURS PRN
Status: DISCONTINUED | OUTPATIENT
Start: 2025-07-02 | End: 2025-07-05 | Stop reason: HOSPADM

## 2025-07-02 RX ORDER — LACOSAMIDE 100 MG/1
100 TABLET ORAL EVERY 12 HOURS SCHEDULED
Status: DISCONTINUED | OUTPATIENT
Start: 2025-07-02 | End: 2025-07-05 | Stop reason: HOSPADM

## 2025-07-02 RX ORDER — MUPIROCIN 20 MG/G
OINTMENT TOPICAL 2 TIMES DAILY
Status: DISCONTINUED | OUTPATIENT
Start: 2025-07-02 | End: 2025-07-05 | Stop reason: HOSPADM

## 2025-07-02 RX ORDER — INSULIN LISPRO 100 [IU]/ML
0-10 INJECTION, SOLUTION INTRAVENOUS; SUBCUTANEOUS
Status: DISCONTINUED | OUTPATIENT
Start: 2025-07-02 | End: 2025-07-05 | Stop reason: HOSPADM

## 2025-07-02 RX ADMIN — LEVETIRACETAM 750 MG: 500 TABLET, FILM COATED ORAL at 08:23

## 2025-07-02 RX ADMIN — LACOSAMIDE 100 MG: 100 TABLET, FILM COATED ORAL at 01:41

## 2025-07-02 RX ADMIN — BACLOFEN 5 MG: 10 TABLET ORAL at 21:05

## 2025-07-02 RX ADMIN — CYCLOBENZAPRINE 10 MG: 10 TABLET, FILM COATED ORAL at 00:27

## 2025-07-02 RX ADMIN — ACETAMINOPHEN 650 MG: 325 TABLET ORAL at 12:24

## 2025-07-02 RX ADMIN — TRAMADOL HYDROCHLORIDE 50 MG: 50 TABLET, COATED ORAL at 12:24

## 2025-07-02 RX ADMIN — CYCLOBENZAPRINE 10 MG: 10 TABLET, FILM COATED ORAL at 21:06

## 2025-07-02 RX ADMIN — LEVETIRACETAM 750 MG: 500 TABLET, FILM COATED ORAL at 00:27

## 2025-07-02 RX ADMIN — APIXABAN 5 MG: 5 TABLET, FILM COATED ORAL at 08:24

## 2025-07-02 RX ADMIN — PIPERACILLIN SODIUM AND TAZOBACTAM SODIUM 3.38 G: 3; .375 INJECTION, SOLUTION INTRAVENOUS at 06:17

## 2025-07-02 RX ADMIN — PIPERACILLIN SODIUM AND TAZOBACTAM SODIUM 3.38 G: 3; .375 INJECTION, SOLUTION INTRAVENOUS at 19:25

## 2025-07-02 RX ADMIN — MUPIROCIN: 20 OINTMENT TOPICAL at 21:06

## 2025-07-02 RX ADMIN — LEVETIRACETAM 750 MG: 500 TABLET, FILM COATED ORAL at 21:05

## 2025-07-02 RX ADMIN — APIXABAN 5 MG: 5 TABLET, FILM COATED ORAL at 00:27

## 2025-07-02 RX ADMIN — LACOSAMIDE 100 MG: 100 TABLET, FILM COATED ORAL at 15:16

## 2025-07-02 RX ADMIN — Medication 30 L/MIN: at 08:25

## 2025-07-02 RX ADMIN — Medication 30 L/MIN: at 21:07

## 2025-07-02 RX ADMIN — CYCLOBENZAPRINE 10 MG: 10 TABLET, FILM COATED ORAL at 08:24

## 2025-07-02 RX ADMIN — TRAMADOL HYDROCHLORIDE 50 MG: 50 TABLET, COATED ORAL at 23:59

## 2025-07-02 RX ADMIN — APIXABAN 5 MG: 5 TABLET, FILM COATED ORAL at 21:06

## 2025-07-02 RX ADMIN — PIPERACILLIN SODIUM AND TAZOBACTAM SODIUM 3.38 G: 3; .375 INJECTION, SOLUTION INTRAVENOUS at 12:25

## 2025-07-02 RX ADMIN — PIPERACILLIN SODIUM AND TAZOBACTAM SODIUM 3.38 G: 3; .375 INJECTION, SOLUTION INTRAVENOUS at 00:13

## 2025-07-02 SDOH — SOCIAL STABILITY: SOCIAL INSECURITY: DOES ANYONE TRY TO KEEP YOU FROM HAVING/CONTACTING OTHER FRIENDS OR DOING THINGS OUTSIDE YOUR HOME?: UNABLE TO ASSESS

## 2025-07-02 SDOH — SOCIAL STABILITY: SOCIAL INSECURITY: DO YOU FEEL ANYONE HAS EXPLOITED OR TAKEN ADVANTAGE OF YOU FINANCIALLY OR OF YOUR PERSONAL PROPERTY?: UNABLE TO ASSESS

## 2025-07-02 SDOH — HEALTH STABILITY: MENTAL HEALTH: HOW OFTEN DO YOU HAVE A DRINK CONTAINING ALCOHOL?: NEVER

## 2025-07-02 SDOH — SOCIAL STABILITY: SOCIAL INSECURITY: HAVE YOU HAD ANY THOUGHTS OF HARMING ANYONE ELSE?: UNABLE TO ASSESS

## 2025-07-02 SDOH — ECONOMIC STABILITY: FOOD INSECURITY
WITHIN THE PAST 12 MONTHS, YOU WORRIED THAT YOUR FOOD WOULD RUN OUT BEFORE YOU GOT THE MONEY TO BUY MORE.: PATIENT UNABLE TO ANSWER

## 2025-07-02 SDOH — SOCIAL STABILITY: SOCIAL INSECURITY: ABUSE: ADULT

## 2025-07-02 SDOH — HEALTH STABILITY: MENTAL HEALTH: HOW OFTEN DO YOU HAVE SIX OR MORE DRINKS ON ONE OCCASION?: NEVER

## 2025-07-02 SDOH — SOCIAL STABILITY: SOCIAL INSECURITY: WERE YOU ABLE TO COMPLETE ALL THE BEHAVIORAL HEALTH SCREENINGS?: NO

## 2025-07-02 SDOH — ECONOMIC STABILITY: FOOD INSECURITY
HOW HARD IS IT FOR YOU TO PAY FOR THE VERY BASICS LIKE FOOD, HOUSING, MEDICAL CARE, AND HEATING?: PATIENT UNABLE TO ANSWER

## 2025-07-02 SDOH — SOCIAL STABILITY: SOCIAL INSECURITY: HAVE YOU HAD THOUGHTS OF HARMING ANYONE ELSE?: YES

## 2025-07-02 SDOH — ECONOMIC STABILITY: HOUSING INSECURITY
IN THE LAST 12 MONTHS, WAS THERE A TIME WHEN YOU WERE NOT ABLE TO PAY THE MORTGAGE OR RENT ON TIME?: PATIENT UNABLE TO ANSWER

## 2025-07-02 SDOH — ECONOMIC STABILITY: FOOD INSECURITY
WITHIN THE PAST 12 MONTHS, THE FOOD YOU BOUGHT JUST DIDN'T LAST AND YOU DIDN'T HAVE MONEY TO GET MORE.: PATIENT UNABLE TO ANSWER

## 2025-07-02 SDOH — SOCIAL STABILITY: SOCIAL INSECURITY: HAS ANYONE EVER THREATENED TO HURT YOUR FAMILY OR YOUR PETS?: UNABLE TO ASSESS

## 2025-07-02 SDOH — ECONOMIC STABILITY: HOUSING INSECURITY: AT ANY TIME IN THE PAST 12 MONTHS, WERE YOU HOMELESS OR LIVING IN A SHELTER (INCLUDING NOW)?: PATIENT UNABLE TO ANSWER

## 2025-07-02 SDOH — HEALTH STABILITY: MENTAL HEALTH: HOW MANY DRINKS CONTAINING ALCOHOL DO YOU HAVE ON A TYPICAL DAY WHEN YOU ARE DRINKING?: PATIENT DOES NOT DRINK

## 2025-07-02 SDOH — SOCIAL STABILITY: SOCIAL INSECURITY: DO YOU FEEL UNSAFE GOING BACK TO THE PLACE WHERE YOU ARE LIVING?: UNABLE TO ASSESS

## 2025-07-02 SDOH — SOCIAL STABILITY: SOCIAL INSECURITY: ARE THERE ANY APPARENT SIGNS OF INJURIES/BEHAVIORS THAT COULD BE RELATED TO ABUSE/NEGLECT?: UNABLE TO ASSESS

## 2025-07-02 SDOH — ECONOMIC STABILITY: TRANSPORTATION INSECURITY
IN THE PAST 12 MONTHS, HAS LACK OF TRANSPORTATION KEPT YOU FROM MEDICAL APPOINTMENTS OR FROM GETTING MEDICATIONS?: PATIENT UNABLE TO ANSWER

## 2025-07-02 SDOH — SOCIAL STABILITY: SOCIAL INSECURITY: ARE YOU OR HAVE YOU BEEN THREATENED OR ABUSED PHYSICALLY, EMOTIONALLY, OR SEXUALLY BY ANYONE?: UNABLE TO ASSESS

## 2025-07-02 ASSESSMENT — ACTIVITIES OF DAILY LIVING (ADL)
BATHING: UNABLE TO ASSESS
JUDGMENT_ADEQUATE_SAFELY_COMPLETE_DAILY_ACTIVITIES: UNABLE TO ASSESS
LACK_OF_TRANSPORTATION: PATIENT UNABLE TO ANSWER
ASSISTIVE_DEVICE: OXYGEN
GROOMING: UNABLE TO ASSESS
FEEDING YOURSELF: UNABLE TO ASSESS
WALKS IN HOME: UNABLE TO ASSESS
LACK_OF_TRANSPORTATION: PATIENT UNABLE TO ANSWER
TOILETING: UNABLE TO ASSESS
DRESSING YOURSELF: UNABLE TO ASSESS
HEARING - RIGHT EAR: FUNCTIONAL
PATIENT'S MEMORY ADEQUATE TO SAFELY COMPLETE DAILY ACTIVITIES?: UNABLE TO ASSESS
HEARING - LEFT EAR: FUNCTIONAL
ADEQUATE_TO_COMPLETE_ADL: UNABLE TO ASSESS

## 2025-07-02 ASSESSMENT — LIFESTYLE VARIABLES
SKIP TO QUESTIONS 9-10: 1
HOW OFTEN DO YOU HAVE A DRINK CONTAINING ALCOHOL: NEVER
HOW MANY STANDARD DRINKS CONTAINING ALCOHOL DO YOU HAVE ON A TYPICAL DAY: PATIENT DOES NOT DRINK
SKIP TO QUESTIONS 9-10: 1
AUDIT-C TOTAL SCORE: 0
AUDIT-C TOTAL SCORE: 0
HOW OFTEN DO YOU HAVE 6 OR MORE DRINKS ON ONE OCCASION: NEVER
PRESCIPTION_ABUSE_PAST_12_MONTHS: NO
AUDIT-C TOTAL SCORE: 0
SUBSTANCE_ABUSE_PAST_12_MONTHS: NO

## 2025-07-02 ASSESSMENT — PATIENT HEALTH QUESTIONNAIRE - PHQ9
2. FEELING DOWN, DEPRESSED OR HOPELESS: NOT AT ALL
1. LITTLE INTEREST OR PLEASURE IN DOING THINGS: NOT AT ALL
SUM OF ALL RESPONSES TO PHQ9 QUESTIONS 1 & 2: 0

## 2025-07-02 ASSESSMENT — COGNITIVE AND FUNCTIONAL STATUS - GENERAL: PATIENT BASELINE BEDBOUND: YES

## 2025-07-02 ASSESSMENT — PAIN - FUNCTIONAL ASSESSMENT
PAIN_FUNCTIONAL_ASSESSMENT: 0-10
PAIN_FUNCTIONAL_ASSESSMENT: 0-10

## 2025-07-02 ASSESSMENT — PAIN DESCRIPTION - DESCRIPTORS: DESCRIPTORS: SPASM

## 2025-07-02 ASSESSMENT — PAIN SCALES - GENERAL: PAINLEVEL_OUTOF10: 10 - WORST POSSIBLE PAIN

## 2025-07-02 ASSESSMENT — PAIN DESCRIPTION - ORIENTATION: ORIENTATION: RIGHT

## 2025-07-02 ASSESSMENT — PAIN DESCRIPTION - LOCATION: LOCATION: ARM

## 2025-07-02 NOTE — PROGRESS NOTES
Pharmacy Medication History Review    Dionte Sharpe is a 54 y.o. male admitted for Malfunction of nephrostomy tube. Pharmacy reviewed the patient's rkoaf-ds-fautoyegc medications and allergies for accuracy.    The list below reflectives the updated PTA list. Please review each medication in order reconciliation for additional clarification and justification.  Prior to Admission medications    Medication Sig Start Date End Date Authorizing Provider   docusate sodium (Colace) 100 mg capsule Take 1 capsule (100 mg) by mouth once daily.   Eddie Rodriguez DO   albuterol 2.5 mg /3 mL (0.083 %) nebulizer solution Take 3 mL (2.5 mg) by nebulization every 4 hours if needed for shortness of breath.   Historical Provider, MD   ammonium lactate (Amlactin) 12 % cream Apply 1 Application topically 2 times a day. To BLE   Historical Provider, MD   apixaban (Eliquis) 5 mg tablet Take 1 tablet (5 mg) by mouth 2 times a day.   Historical Provider, MD   baclofen (Lioresal) 5 mg tablet Take 1 tablet (5 mg) by mouth 4 times a day.   Historical Provider, MD   benzocaine 20 % mucosal gel Use 1 Application in the mouth or throat every 6 hours if needed (tooth pain).   Historical Provider, MD   budesonide (Pulmicort) 0.5 mg/2 mL nebulizer solution Take 2 mL (0.5 mg) by nebulization 2 times a day.   Historical Provider, MD   cholecalciferol (Vitamin D-3) 5,000 Units tablet Take 1 tablet (5,000 Units) by mouth 2 times a week. On Tuesday and Thursday   Historical Provider, MD   cyclobenzaprine (Flexeril) 10 mg tablet Take 1 tablet (10 mg) by mouth 2 times a day.   Historical Provider, MD   dextromethorphan-guaifenesin (Robitussin DM)  mg/5 mL oral liquid Take 10 mL by mouth every 4 hours if needed for cough.   Historical Provider, MD   folic acid (Folvite) 1 mg tablet Take 1 tablet (1 mg) by mouth once daily.   Historical Provider, MD   lacosamide (Vimpat) 100 mg tablet Take 1 tablet (100 mg) by mouth twice a day.   Historical Provider  MD   levETIRAcetam (Keppra) 750 mg tablet Take 1 tablet (750 mg) by mouth 2 times a day.   Historical Provider, MD   loratadine (Claritin) 10 mg tablet Take 1 tablet (10 mg) by mouth once daily.   Historical Provider, MD   metFORMIN (Glucophage) 500 mg tablet Take 1 tablet (500 mg) by mouth once daily.   Historical Provider, MD   metoprolol succinate XL (Toprol-XL) 25 mg 24 hr tablet Take 1 tablet (25 mg) by mouth once daily.   Historical Provider, MD   omeprazole (PriLOSEC) 20 mg DR capsule Take 1 capsule (20 mg) by mouth once daily.   Historical Provider, MD   oxyCODONE-acetaminophen (Percocet) 5-325 mg tablet Take 1 tablet by mouth 2 times a day.   Historical Provider, MD   oxyCODONE-acetaminophen (Percocet) 5-325 mg tablet Take 1 tablet by mouth every 8 hours if needed for severe pain (7 - 10) (breakthrough pain).   Historical Provider, MD   oxygen (O2) gas therapy Inhale if needed (SPO2 > 92%).   Historical Provider, MD   pyridoxine (Vitamin B-6) 100 mg tablet Take 1 tablet (100 mg) by mouth once daily.   Eddie Rodriguez,    sertraline (Zoloft) 50 mg tablet Take 1 tablet (50 mg) by mouth once daily.   Historical Provider, MD        The list below reflectives the updated allergy list. Please review each documented allergy for additional clarification and justification.  Allergies  Reviewed by Yoon Andrea on 7/1/2025   No Known Allergies         Below are additional concerns with the patient's PTA list.      Stephany Schmid

## 2025-07-02 NOTE — PROGRESS NOTES
Dionte Sharpe is a 54 y.o. male on day 0 of admission presenting with Malfunction of nephrostomy tube.      Subjective   07/02-Patient seen and fully examined at bedside, patient ill appearing, acutely complex, marked decline from baseline. Patient remains hospitalized for acute onset with complex medical and pharmacological management, malfunction of nephrostomy tube, IR consulted for replacement. Will continue antibiotics for sepsis, leukocytosis. WBC @ 12.6, elevated from 10.1. Will continue with empiric coverage, cultures in process, await final results.  Hemoglobin @ 9.5 drop from 10.8. Continue to monitor overnight and repeat labs in the morning. Slow but progressive improvements noted. High Complexity with updates to multiple problems, adjustments to treatment plan, and need for ongoing inpatient care. Long discussion on goals of care with patient and family, will continue current and await diagnostic findings. Patient reports: no new complaints, decline from baseline, weakness.        Objective     Last Recorded Vitals  /70   Pulse (!) 107   Temp 37.1 °C (98.8 °F) (Temporal)   Resp 20   Wt 99.8 kg (220 lb)   SpO2 96%   Intake/Output last 3 Shifts:  No intake or output data in the 24 hours ending 07/02/25 1522    Admission Weight  Weight: 99.8 kg (220 lb) (07/01/25 1634)    Daily Weight  07/01/25 : 99.8 kg (220 lb)    Image Results  CT chest abdomen pelvis wo IV contrast  Narrative: STUDY:  CT Chest, Abdomen, and Pelvis without IV Contrast; 7/1/2025 at 9:01 PM  INDICATION:  Nephrostomy tube, trach.  COMPARISON:  CTA chest 2/10/2023, CT chest, abdomen and pelvis 5/22/2022.  ACCESSION NUMBER(S):  [id number]  ORDERING CLINICIAN:  GONSALO RAM  TECHNIQUE:  CT of the chest, abdomen, and pelvis was performed.  Contiguous axial  images were obtained at 3 mm slice thickness through the chest,  abdomen, and pelvis.  Coronal and sagittal reconstructions at 3 mm  slice thickness were performed.  No  intravenous contrast was  administered.     FINDINGS:  Please note that the evaluation of vessels, lymph nodes and organs is  limited without intravenous contrast.   CHEST:  Thyroid: No acute pathology  Cardiac: The heart is normal in size.  No pericardial effusion.  The coronary arteries and associated branching segments demonstrate no  acute pathology.  Pulmonary / Airways: Evaluation limited due to diminished inspiratory  effort.  There is general mosaicism attenuation and bilateral lower  lobe atelectatic changes.  No focal consolidation, or pleural  effusion.  There is no pneumothorax.  A tracheostomy tube is in proper  position..  Pleura: No acute pathology  Mediastinal: No suspicious adenopathy.  Vasculature: No aneurysm of the intrathoracic aorta.  There is  conventional aortic arch anatomy with type I configuration.  Extrathoracic: No acute pathology.  ABDOMEN/PELVIS  Hepatobiliary: No acute pathology of the liver or gallbladder.  Spleen: No acute pathology.  Pancreas: No acute pathology.  Adrenals: Generalized parenchymal atrophy bilaterally.  Kidneys/Urinary bladder: A left percutaneous nephrostomy tube noted to  be in proper position with the pigtail within the central collecting  system.  Large, nonobstructing renal calculi noted in the bilateral  renal collecting system.  No hydronephrosis.  Calcification layering in the dependent portion of the fluid-filled  urinary bladder.  Gastrointestinal tract: Prominence of stool in the rectal vault  suggesting constipation.  Additionally area of focal rounded  hyperdense fecal material within the mid transverse colon with  generalized decompression of the distal transverse into descending  colon.  This raises the possibility for a fecal bezoar.  Right lower  quadrant colostomy noted to be intact.  Vasculature: No acute pathology.  Lymph nodes: No acute pathology.  Mesentery/Peritoneum: No acute pathology.  Pelvis/Reproductive Organs: No acute  "pathology.  Osseous/Miscellaneous: No acute pathology.  Generalized loss of domain  of the anterior abdominal wall with a large small bowel containing  hernia noted  Impression: *Tracheostomy tube in proper position  *Left percutaneous nephrostomy tube in proper position  *Additional, nonacute imaging findings as described above.  Signed by Tisha Rutherford MD                     History Of Present Illness  Dionte Sharpe is a 54 y.o. male with history of cerebral palsy with spastic hemiplegia right side and kidney stones who presented to the ED from nursing facility for evaluation of decreased urine output from nephrostomy tube for the past 2 days with associated left flank pain.  He denies sweats, chills.  He states that oxygen via trach collar is \"drying him out\" and making him cough.  Currently requests to eat and drink.     ED course: Patient tachypneic and hypoxic.  Heart rate upper normal consistent with previous ECGs.  Labs remarkable for mild leukocytosis of 12.6.  UA with evidence of potential infection.  Patient started on Zosyn per previous cultures.  CT abdomen pelvis without acute findings per radiologist.     Past Medical History  [Medical History]    [Medical History]  Past Medical History       Diagnosis Date    Cerebral palsy      Kidney stones          Surgical History  [Surgical History]    [Surgical History]  Past Surgical History        Procedure Laterality Date    IR  NEPHROSTOMY PLACEMENT   11/25/2023     IR  NEPHROSTOMY PLACEMENT 11/25/2023 Omid Desir MD [facility]GIO        Social History  He reports that he has quit smoking. His smoking use included cigarettes. He has never used smokeless tobacco. He reports that he does not currently use alcohol. No history on file for drug use.     Family History  [Family History]    [Family History]  No family history on file.     Allergies  Patient has no known allergies.     Review of Systems   All other systems reviewed and are negative.     10 " "point ROS negative except as specified in HPI     Physical Exam  Vitals and nursing note reviewed.   Constitutional:       Appearance: He is obese.      Comments: Needs set up for meal   HENT:      Head: Normocephalic and atraumatic.      Right Ear: External ear normal.      Left Ear: External ear normal.   Eyes:      Conjunctiva/sclera: Conjunctivae normal.   Cardiovascular:      Rate and Rhythm: Regular rhythm. Tachycardia present.   Pulmonary:      Effort: Pulmonary effort is normal.      Comments: Trach collar  Abdominal:      Palpations: Abdomen is soft.   Genitourinary:     Comments: Colostomy  Musculoskeletal:      Comments: Erbs palsy RUE.  Using LUE to eat   Skin:     General: Skin is warm and dry.   Neurological:      Mental Status: He is alert. Mental status is at baseline.   Psychiatric:         Behavior: Behavior normal.            Last Recorded Vitals  Blood pressure 119/70, pulse (!) 107, temperature 37.1 °C (98.8 °F), temperature source Temporal, resp. rate 20, height 1.803 m (5' 11\"), weight 99.8 kg (220 lb), SpO2 96%.     Relevant Results           Results for orders placed or performed during the hospital encounter of 07/01/25 (from the past 24 hours)   CBC and Auto Differential   Result Value Ref Range     WBC 12.6 (H) 4.4 - 11.3 x10*3/uL     nRBC 0.0 0.0 - 0.0 /100 WBCs     RBC 5.86 4.50 - 5.90 x10*6/uL     Hemoglobin 10.8 (L) 13.5 - 17.5 g/dL     Hematocrit 42.9 41.0 - 52.0 %     MCV 73 (L) 80 - 100 fL     MCH 18.4 (L) 26.0 - 34.0 pg     MCHC 25.2 (L) 32.0 - 36.0 g/dL     RDW 20.5 (H) 11.5 - 14.5 %     Platelets 354 150 - 450 x10*3/uL     Neutrophils % 72.9 40.0 - 80.0 %     Immature Granulocytes %, Automated 0.5 0.0 - 0.9 %     Lymphocytes % 14.4 13.0 - 44.0 %     Monocytes % 6.9 2.0 - 10.0 %     Eosinophils % 4.6 0.0 - 6.0 %     Basophils % 0.7 0.0 - 2.0 %     Neutrophils Absolute 9.20 (H) 1.20 - 7.70 x10*3/uL     Immature Granulocytes Absolute, Automated 0.06 0.00 - 0.70 x10*3/uL     " Lymphocytes Absolute 1.82 1.20 - 4.80 x10*3/uL     Monocytes Absolute 0.87 0.10 - 1.00 x10*3/uL     Eosinophils Absolute 0.58 0.00 - 0.70 x10*3/uL     Basophils Absolute 0.09 0.00 - 0.10 x10*3/uL   Comprehensive metabolic panel   Result Value Ref Range     Glucose 141 (H) 74 - 99 mg/dL     Sodium 138 136 - 145 mmol/L     Potassium 4.2 3.5 - 5.3 mmol/L     Chloride 100 98 - 107 mmol/L     Bicarbonate 31 21 - 32 mmol/L     Anion Gap 11 10 - 20 mmol/L     Urea Nitrogen 20 6 - 23 mg/dL     Creatinine 1.07 0.50 - 1.30 mg/dL     eGFR 83 >60 mL/min/1.73m*2     Calcium 8.9 8.6 - 10.3 mg/dL     Albumin 4.2 3.4 - 5.0 g/dL     Alkaline Phosphatase 82 33 - 120 U/L     Total Protein 7.4 6.4 - 8.2 g/dL     AST 18 9 - 39 U/L     Bilirubin, Total 0.3 0.0 - 1.2 mg/dL     ALT 20 10 - 52 U/L   Magnesium   Result Value Ref Range     Magnesium 1.71 1.60 - 2.40 mg/dL   Urinalysis with Reflex Culture and Microscopic   Result Value Ref Range     Color, Urine Light-Orange (N) Light-Yellow, Yellow, Dark-Yellow     Appearance, Urine Turbid (N) Clear     Specific Gravity, Urine 1.012 1.005 - 1.035     pH, Urine 7.0 5.0, 5.5, 6.0, 6.5, 7.0, 7.5, 8.0     Protein, Urine 100 (2+) (A) NEGATIVE, 10 (TRACE), 20 (TRACE) mg/dL     Glucose, Urine Normal Normal mg/dL     Blood, Urine 1.0 (3+) (A) NEGATIVE mg/dL     Ketones, Urine NEGATIVE NEGATIVE mg/dL     Bilirubin, Urine NEGATIVE NEGATIVE mg/dL     Urobilinogen, Urine Normal Normal mg/dL     Nitrite, Urine NEGATIVE NEGATIVE     Leukocyte Esterase, Urine 500 Mario/uL (A) NEGATIVE   Extra Urine Gray Tube   Result Value Ref Range     Extra Tube       Microscopic Only, Urine   Result Value Ref Range     WBC, Urine >50 (A) 1-5, NONE /HPF     WBC Clumps, Urine MANY Reference range not established. /HPF     RBC, Urine >20 (A) NONE, 1-2, 3-5 /HPF     Bacteria, Urine 1+ (A) NONE SEEN /HPF     Budding Yeast, Urine PRESENT (A) NONE /HPF   CBC   Result Value Ref Range     WBC 12.6 (H) 4.4 - 11.3 x10*3/uL     nRBC  0.0 0.0 - 0.0 /100 WBCs     RBC 5.23 4.50 - 5.90 x10*6/uL     Hemoglobin 9.5 (L) 13.5 - 17.5 g/dL     Hematocrit 38.6 (L) 41.0 - 52.0 %     MCV 74 (L) 80 - 100 fL     MCH 18.2 (L) 26.0 - 34.0 pg     MCHC 24.6 (L) 32.0 - 36.0 g/dL     RDW 20.3 (H) 11.5 - 14.5 %     Platelets 331 150 - 450 x10*3/uL   POCT GLUCOSE   Result Value Ref Range     POCT Glucose 146 (H) 74 - 99 mg/dL   POCT GLUCOSE   Result Value Ref Range     POCT Glucose 153 (H) 74 - 99 mg/dL      CT chest abdomen pelvis wo IV contrast  Result Date: 7/1/2025  STUDY: CT Chest, Abdomen, and Pelvis without IV Contrast; 7/1/2025 at 9:01 PM INDICATION: Nephrostomy tube, trach. COMPARISON: CTA chest 2/10/2023, CT chest, abdomen and pelvis 5/22/2022. ACCESSION NUMBER(S): [id number] ORDERING CLINICIAN: GONSALO RAM TECHNIQUE: CT of the chest, abdomen, and pelvis was performed.  Contiguous axial images were obtained at 3 mm slice thickness through the chest, abdomen, and pelvis.  Coronal and sagittal reconstructions at 3 mm slice thickness were performed.  No intravenous contrast was administered.   FINDINGS: Please note that the evaluation of vessels, lymph nodes and organs is limited without intravenous contrast. CHEST: Thyroid: No acute pathology Cardiac: The heart is normal in size.  No pericardial effusion. The coronary arteries and associated branching segments demonstrate no acute pathology. Pulmonary / Airways: Evaluation limited due to diminished inspiratory effort.  There is general mosaicism attenuation and bilateral lower lobe atelectatic changes.  No focal consolidation, or pleural effusion.  There is no pneumothorax.  A tracheostomy tube is in proper position.. Pleura: No acute pathology Mediastinal: No suspicious adenopathy. Vasculature: No aneurysm of the intrathoracic aorta.  There is conventional aortic arch anatomy with type I configuration. Extrathoracic: No acute pathology. ABDOMEN/PELVIS Hepatobiliary: No acute pathology of the liver or  gallbladder. Spleen: No acute pathology. Pancreas: No acute pathology. Adrenals: Generalized parenchymal atrophy bilaterally. Kidneys/Urinary bladder: A left percutaneous nephrostomy tube noted to be in proper position with the pigtail within the central collecting system.  Large, nonobstructing renal calculi noted in the bilateral renal collecting system.  No hydronephrosis. Calcification layering in the dependent portion of the fluid-filled urinary bladder. Gastrointestinal tract: Prominence of stool in the rectal vault suggesting constipation.  Additionally area of focal rounded hyperdense fecal material within the mid transverse colon with generalized decompression of the distal transverse into descending colon.  This raises the possibility for a fecal bezoar.  Right lower quadrant colostomy noted to be intact. Vasculature: No acute pathology. Lymph nodes: No acute pathology. Mesentery/Peritoneum: No acute pathology. Pelvis/Reproductive Organs: No acute pathology. Osseous/Miscellaneous: No acute pathology.  Generalized loss of domain of the anterior abdominal wall with a large small bowel containing hernia noted     *Tracheostomy tube in proper position *Left percutaneous nephrostomy tube in proper position *Additional, nonacute imaging findings as described above. Signed by Tisha Rutherford MD           Assessment & Plan  Malfunction of nephrostomy tube     54-year-old male presented to ED for evaluation of decreased urine output from nephrostomy tube for the past 2 days found to have suspected UTI per UA.  CT abdomen pelvis pending.      #Malfunction of nephrostomy tube  #Rule out UTI  - Continue antibiotic started in ED, follow urine culture  - IR consult     Chronic conditions  #Cerebral palsy with spastic hemiplegia  #GERD  #Chronic pain  #Diabetes  #Hypertension  #Tachycardia  - Continue home meds when reconciled  - Cardiac/carb consistent diet       Patient fully evaluated 07/01, thorough record review  performed of previous labs and notes from prior encounters. Plan discussed with interdisciplinary team, consults placed, appreciate input. Will continue current and repeat labs in the AM.       Discharge planning discussed with patient and care team. Therapy evaluations ordered. Patient aware and agreeable to current plan, continue plan as above.      I spent a total of 75 minutes on the date of the service which included preparing to see the patient, face-to-face patient care, completing clinical documentation, obtaining and/or reviewing separately obtained history, performing a medically appropriate examination, counseling and educating the patient/family/caregiver, ordering medications, tests, or procedures, communicating with other HCPs (not separately reported), independently interpreting results (not separately reported), communicating results to the patient/family/caregiver, and care coordination (not separately reported).         Ksenia Mcgee                       Physical Exam  Vitals and nursing note reviewed.       General: in no acute distress  Eyes: PERRLA   HENT: Normo-cephalic, atraumatic.   CV: Irregular rate, regular rhythm.   Resp: Breathing non-labored,  diminished to auscultation bilaterally  GI: BS x4   : monitor intake and output  MSK/Extremities: No gross bony deformities. PT/OT on the case  Skin: Warm. Appropriate color, continue offloading  Neuro:  Face symmetric.   Psych: returning to baseline, improved     10 point ROS negative unless otherwise noted in HPI    Patient fully evaluated 07/02  for    Problem List Items Addressed This Visit       Acute UTI    * (Principal) Malfunction of nephrostomy tube - Primary     Brought to hospital - had concerns including Flank Pain.  Patient seen resting in bed with head of bed elevated, no s/s or c/o acute difficulties at this time.  Vital signs for last 24 hours Temperature:  [37.1 °C (98.8 °F)] 37.1 °C (98.8 °F)  Heart Rate:  []  107  Respirations:  [20-38] 20  BP: (119-151)/(65-92) 119/70  FiO2 (%):  [30 %] 30 %    No intake/output data recorded.  Patient still requiring frequent cardiac and SPO2 monitoring. Continue aggressive pulmonary hygiene and oral hygiene. Off loading as tolerated for skin integrity. Medications and labs reviewed-   Results for orders placed or performed during the hospital encounter of 07/01/25 (from the past 24 hours)   CBC and Auto Differential   Result Value Ref Range    WBC 12.6 (H) 4.4 - 11.3 x10*3/uL    nRBC 0.0 0.0 - 0.0 /100 WBCs    RBC 5.86 4.50 - 5.90 x10*6/uL    Hemoglobin 10.8 (L) 13.5 - 17.5 g/dL    Hematocrit 42.9 41.0 - 52.0 %    MCV 73 (L) 80 - 100 fL    MCH 18.4 (L) 26.0 - 34.0 pg    MCHC 25.2 (L) 32.0 - 36.0 g/dL    RDW 20.5 (H) 11.5 - 14.5 %    Platelets 354 150 - 450 x10*3/uL    Neutrophils % 72.9 40.0 - 80.0 %    Immature Granulocytes %, Automated 0.5 0.0 - 0.9 %    Lymphocytes % 14.4 13.0 - 44.0 %    Monocytes % 6.9 2.0 - 10.0 %    Eosinophils % 4.6 0.0 - 6.0 %    Basophils % 0.7 0.0 - 2.0 %    Neutrophils Absolute 9.20 (H) 1.20 - 7.70 x10*3/uL    Immature Granulocytes Absolute, Automated 0.06 0.00 - 0.70 x10*3/uL    Lymphocytes Absolute 1.82 1.20 - 4.80 x10*3/uL    Monocytes Absolute 0.87 0.10 - 1.00 x10*3/uL    Eosinophils Absolute 0.58 0.00 - 0.70 x10*3/uL    Basophils Absolute 0.09 0.00 - 0.10 x10*3/uL   Comprehensive metabolic panel   Result Value Ref Range    Glucose 141 (H) 74 - 99 mg/dL    Sodium 138 136 - 145 mmol/L    Potassium 4.2 3.5 - 5.3 mmol/L    Chloride 100 98 - 107 mmol/L    Bicarbonate 31 21 - 32 mmol/L    Anion Gap 11 10 - 20 mmol/L    Urea Nitrogen 20 6 - 23 mg/dL    Creatinine 1.07 0.50 - 1.30 mg/dL    eGFR 83 >60 mL/min/1.73m*2    Calcium 8.9 8.6 - 10.3 mg/dL    Albumin 4.2 3.4 - 5.0 g/dL    Alkaline Phosphatase 82 33 - 120 U/L    Total Protein 7.4 6.4 - 8.2 g/dL    AST 18 9 - 39 U/L    Bilirubin, Total 0.3 0.0 - 1.2 mg/dL    ALT 20 10 - 52 U/L   Magnesium   Result Value  Ref Range    Magnesium 1.71 1.60 - 2.40 mg/dL   Urinalysis with Reflex Culture and Microscopic   Result Value Ref Range    Color, Urine Light-Orange (N) Light-Yellow, Yellow, Dark-Yellow    Appearance, Urine Turbid (N) Clear    Specific Gravity, Urine 1.012 1.005 - 1.035    pH, Urine 7.0 5.0, 5.5, 6.0, 6.5, 7.0, 7.5, 8.0    Protein, Urine 100 (2+) (A) NEGATIVE, 10 (TRACE), 20 (TRACE) mg/dL    Glucose, Urine Normal Normal mg/dL    Blood, Urine 1.0 (3+) (A) NEGATIVE mg/dL    Ketones, Urine NEGATIVE NEGATIVE mg/dL    Bilirubin, Urine NEGATIVE NEGATIVE mg/dL    Urobilinogen, Urine Normal Normal mg/dL    Nitrite, Urine NEGATIVE NEGATIVE    Leukocyte Esterase, Urine 500 Mario/uL (A) NEGATIVE   Extra Urine Gray Tube   Result Value Ref Range    Extra Tube     Microscopic Only, Urine   Result Value Ref Range    WBC, Urine >50 (A) 1-5, NONE /HPF    WBC Clumps, Urine MANY Reference range not established. /HPF    RBC, Urine >20 (A) NONE, 1-2, 3-5 /HPF    Bacteria, Urine 1+ (A) NONE SEEN /HPF    Budding Yeast, Urine PRESENT (A) NONE /HPF   CBC   Result Value Ref Range    WBC 12.6 (H) 4.4 - 11.3 x10*3/uL    nRBC 0.0 0.0 - 0.0 /100 WBCs    RBC 5.23 4.50 - 5.90 x10*6/uL    Hemoglobin 9.5 (L) 13.5 - 17.5 g/dL    Hematocrit 38.6 (L) 41.0 - 52.0 %    MCV 74 (L) 80 - 100 fL    MCH 18.2 (L) 26.0 - 34.0 pg    MCHC 24.6 (L) 32.0 - 36.0 g/dL    RDW 20.3 (H) 11.5 - 14.5 %    Platelets 331 150 - 450 x10*3/uL   POCT GLUCOSE   Result Value Ref Range    POCT Glucose 146 (H) 74 - 99 mg/dL   POCT GLUCOSE   Result Value Ref Range    POCT Glucose 153 (H) 74 - 99 mg/dL      Patient recently received an antibiotic (last 12 hours)       Date/Time Action Medication Dose    07/02/25 1225 New Bag    piperacillin-tazobactam (Zosyn) 3.375 g in dextrose (iso) IV 50 mL 3.375 g    07/02/25 0617 New Bag    piperacillin-tazobactam (Zosyn) 3.375 g in dextrose (iso) IV 50 mL 3.375 g           Plan discussed with interdisciplinary team, continue current and repeat  labs in the AM.     Discharge planning discussed with patient and care team. Therapy evaluations ordered.     Anticipate - SNF    Patient aware and agreeable to current plan, continue plan as above.     I spent a total of 60 minutes on the date of the service which included preparing to see the patient, face-to-face patient care, completing clinical documentation, obtaining and/or reviewing separately obtained history, performing a medically appropriate examination, counseling and educating the patient/family/caregiver, ordering medications, tests, or procedures, communicating with other HCPs (not separately reported), independently interpreting results (not separately reported), communicating results to the patient/family/caregiver, and care coordination (not separately reported).     Relevant Results                          Assessment & Plan  Malfunction of nephrostomy tube               Ksenia Mcgee

## 2025-07-02 NOTE — NURSING NOTE
Attempted to bring patient down for neph tube exchange with local anesthetic due to eating today. Patient refused and requested moderate sedation for exchange. Unable to complete today in another slot per Dr. Amaro. Patient will be made NPO by Dr. Melendez and reviewed with IR attending tomorrow for replacement. SYED Prado in ER aware

## 2025-07-02 NOTE — H&P
"History Of Present Illness  Dionte Sharpe is a 54 y.o. male with history of cerebral palsy with spastic hemiplegia right side and kidney stones who presented to the ED from nursing facility for evaluation of decreased urine output from nephrostomy tube for the past 2 days with associated left flank pain.  He denies sweats, chills.  He states that oxygen via trach collar is \"drying him out\" and making him cough.  Currently requests to eat and drink.    ED course: Patient tachypneic and hypoxic.  Heart rate upper normal consistent with previous ECGs.  Labs remarkable for mild leukocytosis of 12.6.  UA with evidence of potential infection.  Patient started on Zosyn per previous cultures.  CT abdomen pelvis without acute findings per radiologist.     Past Medical History  Medical History[1]    Surgical History  Surgical History[2]     Social History  He reports that he has quit smoking. His smoking use included cigarettes. He has never used smokeless tobacco. He reports that he does not currently use alcohol. No history on file for drug use.    Family History  Family History[3]     Allergies  Patient has no known allergies.    Review of Systems   All other systems reviewed and are negative.    10 point ROS negative except as specified in HPI    Physical Exam  Vitals and nursing note reviewed.   Constitutional:       Appearance: He is obese.      Comments: Needs set up for meal   HENT:      Head: Normocephalic and atraumatic.      Right Ear: External ear normal.      Left Ear: External ear normal.   Eyes:      Conjunctiva/sclera: Conjunctivae normal.   Cardiovascular:      Rate and Rhythm: Regular rhythm. Tachycardia present.   Pulmonary:      Effort: Pulmonary effort is normal.      Comments: Trach collar  Abdominal:      Palpations: Abdomen is soft.   Genitourinary:     Comments: Colostomy  Musculoskeletal:      Comments: Erbs palsy RUE.  Using LUE to eat   Skin:     General: Skin is warm and dry.   Neurological:      " "Mental Status: He is alert. Mental status is at baseline.   Psychiatric:         Behavior: Behavior normal.          Last Recorded Vitals  Blood pressure 119/70, pulse (!) 107, temperature 37.1 °C (98.8 °F), temperature source Temporal, resp. rate 20, height 1.803 m (5' 11\"), weight 99.8 kg (220 lb), SpO2 96%.    Relevant Results    Results for orders placed or performed during the hospital encounter of 07/01/25 (from the past 24 hours)   CBC and Auto Differential   Result Value Ref Range    WBC 12.6 (H) 4.4 - 11.3 x10*3/uL    nRBC 0.0 0.0 - 0.0 /100 WBCs    RBC 5.86 4.50 - 5.90 x10*6/uL    Hemoglobin 10.8 (L) 13.5 - 17.5 g/dL    Hematocrit 42.9 41.0 - 52.0 %    MCV 73 (L) 80 - 100 fL    MCH 18.4 (L) 26.0 - 34.0 pg    MCHC 25.2 (L) 32.0 - 36.0 g/dL    RDW 20.5 (H) 11.5 - 14.5 %    Platelets 354 150 - 450 x10*3/uL    Neutrophils % 72.9 40.0 - 80.0 %    Immature Granulocytes %, Automated 0.5 0.0 - 0.9 %    Lymphocytes % 14.4 13.0 - 44.0 %    Monocytes % 6.9 2.0 - 10.0 %    Eosinophils % 4.6 0.0 - 6.0 %    Basophils % 0.7 0.0 - 2.0 %    Neutrophils Absolute 9.20 (H) 1.20 - 7.70 x10*3/uL    Immature Granulocytes Absolute, Automated 0.06 0.00 - 0.70 x10*3/uL    Lymphocytes Absolute 1.82 1.20 - 4.80 x10*3/uL    Monocytes Absolute 0.87 0.10 - 1.00 x10*3/uL    Eosinophils Absolute 0.58 0.00 - 0.70 x10*3/uL    Basophils Absolute 0.09 0.00 - 0.10 x10*3/uL   Comprehensive metabolic panel   Result Value Ref Range    Glucose 141 (H) 74 - 99 mg/dL    Sodium 138 136 - 145 mmol/L    Potassium 4.2 3.5 - 5.3 mmol/L    Chloride 100 98 - 107 mmol/L    Bicarbonate 31 21 - 32 mmol/L    Anion Gap 11 10 - 20 mmol/L    Urea Nitrogen 20 6 - 23 mg/dL    Creatinine 1.07 0.50 - 1.30 mg/dL    eGFR 83 >60 mL/min/1.73m*2    Calcium 8.9 8.6 - 10.3 mg/dL    Albumin 4.2 3.4 - 5.0 g/dL    Alkaline Phosphatase 82 33 - 120 U/L    Total Protein 7.4 6.4 - 8.2 g/dL    AST 18 9 - 39 U/L    Bilirubin, Total 0.3 0.0 - 1.2 mg/dL    ALT 20 10 - 52 U/L "   Magnesium   Result Value Ref Range    Magnesium 1.71 1.60 - 2.40 mg/dL   Urinalysis with Reflex Culture and Microscopic   Result Value Ref Range    Color, Urine Light-Orange (N) Light-Yellow, Yellow, Dark-Yellow    Appearance, Urine Turbid (N) Clear    Specific Gravity, Urine 1.012 1.005 - 1.035    pH, Urine 7.0 5.0, 5.5, 6.0, 6.5, 7.0, 7.5, 8.0    Protein, Urine 100 (2+) (A) NEGATIVE, 10 (TRACE), 20 (TRACE) mg/dL    Glucose, Urine Normal Normal mg/dL    Blood, Urine 1.0 (3+) (A) NEGATIVE mg/dL    Ketones, Urine NEGATIVE NEGATIVE mg/dL    Bilirubin, Urine NEGATIVE NEGATIVE mg/dL    Urobilinogen, Urine Normal Normal mg/dL    Nitrite, Urine NEGATIVE NEGATIVE    Leukocyte Esterase, Urine 500 Mario/uL (A) NEGATIVE   Extra Urine Gray Tube   Result Value Ref Range    Extra Tube     Microscopic Only, Urine   Result Value Ref Range    WBC, Urine >50 (A) 1-5, NONE /HPF    WBC Clumps, Urine MANY Reference range not established. /HPF    RBC, Urine >20 (A) NONE, 1-2, 3-5 /HPF    Bacteria, Urine 1+ (A) NONE SEEN /HPF    Budding Yeast, Urine PRESENT (A) NONE /HPF   CBC   Result Value Ref Range    WBC 12.6 (H) 4.4 - 11.3 x10*3/uL    nRBC 0.0 0.0 - 0.0 /100 WBCs    RBC 5.23 4.50 - 5.90 x10*6/uL    Hemoglobin 9.5 (L) 13.5 - 17.5 g/dL    Hematocrit 38.6 (L) 41.0 - 52.0 %    MCV 74 (L) 80 - 100 fL    MCH 18.2 (L) 26.0 - 34.0 pg    MCHC 24.6 (L) 32.0 - 36.0 g/dL    RDW 20.3 (H) 11.5 - 14.5 %    Platelets 331 150 - 450 x10*3/uL   POCT GLUCOSE   Result Value Ref Range    POCT Glucose 146 (H) 74 - 99 mg/dL   POCT GLUCOSE   Result Value Ref Range    POCT Glucose 153 (H) 74 - 99 mg/dL     CT chest abdomen pelvis wo IV contrast  Result Date: 7/1/2025  STUDY: CT Chest, Abdomen, and Pelvis without IV Contrast; 7/1/2025 at 9:01 PM INDICATION: Nephrostomy tube, trach. COMPARISON: CTA chest 2/10/2023, CT chest, abdomen and pelvis 5/22/2022. ACCESSION NUMBER(S): [id number] ORDERING CLINICIAN: GONSALO RAM TECHNIQUE: CT of the chest, abdomen,  and pelvis was performed.  Contiguous axial images were obtained at 3 mm slice thickness through the chest, abdomen, and pelvis.  Coronal and sagittal reconstructions at 3 mm slice thickness were performed.  No intravenous contrast was administered.   FINDINGS: Please note that the evaluation of vessels, lymph nodes and organs is limited without intravenous contrast. CHEST: Thyroid: No acute pathology Cardiac: The heart is normal in size.  No pericardial effusion. The coronary arteries and associated branching segments demonstrate no acute pathology. Pulmonary / Airways: Evaluation limited due to diminished inspiratory effort.  There is general mosaicism attenuation and bilateral lower lobe atelectatic changes.  No focal consolidation, or pleural effusion.  There is no pneumothorax.  A tracheostomy tube is in proper position.. Pleura: No acute pathology Mediastinal: No suspicious adenopathy. Vasculature: No aneurysm of the intrathoracic aorta.  There is conventional aortic arch anatomy with type I configuration. Extrathoracic: No acute pathology. ABDOMEN/PELVIS Hepatobiliary: No acute pathology of the liver or gallbladder. Spleen: No acute pathology. Pancreas: No acute pathology. Adrenals: Generalized parenchymal atrophy bilaterally. Kidneys/Urinary bladder: A left percutaneous nephrostomy tube noted to be in proper position with the pigtail within the central collecting system.  Large, nonobstructing renal calculi noted in the bilateral renal collecting system.  No hydronephrosis. Calcification layering in the dependent portion of the fluid-filled urinary bladder. Gastrointestinal tract: Prominence of stool in the rectal vault suggesting constipation.  Additionally area of focal rounded hyperdense fecal material within the mid transverse colon with generalized decompression of the distal transverse into descending colon.  This raises the possibility for a fecal bezoar.  Right lower quadrant colostomy noted to be  intact. Vasculature: No acute pathology. Lymph nodes: No acute pathology. Mesentery/Peritoneum: No acute pathology. Pelvis/Reproductive Organs: No acute pathology. Osseous/Miscellaneous: No acute pathology.  Generalized loss of domain of the anterior abdominal wall with a large small bowel containing hernia noted    *Tracheostomy tube in proper position *Left percutaneous nephrostomy tube in proper position *Additional, nonacute imaging findings as described above. Signed by Tisha Rutherford MD         Assessment & Plan  Malfunction of nephrostomy tube    54-year-old male presented to ED for evaluation of decreased urine output from nephrostomy tube for the past 2 days found to have suspected UTI per UA.  CT abdomen pelvis pending.     #Malfunction of nephrostomy tube  #Rule out UTI  - Continue antibiotic started in ED, follow urine culture  - IR consult    Chronic conditions  #Cerebral palsy with spastic hemiplegia  #GERD  #Chronic pain  #Diabetes  #Hypertension  #Tachycardia  - Continue home meds when reconciled  - Cardiac/carb consistent diet      Patient fully evaluated 07/01, thorough record review performed of previous labs and notes from prior encounters. Plan discussed with interdisciplinary team, consults placed, appreciate input. Will continue current and repeat labs in the AM.      Discharge planning discussed with patient and care team. Therapy evaluations ordered. Patient aware and agreeable to current plan, continue plan as above.     I spent a total of 75 minutes on the date of the service which included preparing to see the patient, face-to-face patient care, completing clinical documentation, obtaining and/or reviewing separately obtained history, performing a medically appropriate examination, counseling and educating the patient/family/caregiver, ordering medications, tests, or procedures, communicating with other HCPs (not separately reported), independently interpreting results (not separately  reported), communicating results to the patient/family/caregiver, and care coordination (not separately reported).       Ksenia Mcgee           [1]   Past Medical History:  Diagnosis Date    Cerebral palsy     Kidney stones    [2]   Past Surgical History:  Procedure Laterality Date    IR  NEPHROSTOMY PLACEMENT  11/25/2023    IR  NEPHROSTOMY PLACEMENT 11/25/2023 Omid Desir MD [facility]   [3] No family history on file.

## 2025-07-02 NOTE — H&P
"History Of Present Illness  Dionte Sharpe is a 54 y.o. male with history of cerebral palsy with spastic hemiplegia right side and kidney stones who presented to the ED from nursing facility for evaluation of decreased urine output from nephrostomy tube for the past 2 days with associated left flank pain.  He denies sweats, chills.  He states that oxygen via trach collar is \"drying him out\" and making him cough.  Currently requests to eat and drink.    ED course: Patient tachypneic and hypoxic.  Heart rate upper normal consistent with previous ECGs.  Labs remarkable for mild leukocytosis of 12.6.  UA with evidence of potential infection.  Patient started on Zosyn per previous cultures.  CT abdomen pelvis without acute findings per radiologist.     Past Medical History  Medical History[1]    Surgical History  Surgical History[2]     Social History  He reports that he has quit smoking. His smoking use included cigarettes. He has never used smokeless tobacco. He reports that he does not currently use alcohol. No history on file for drug use.    Family History  Family History[3]     Allergies  Patient has no known allergies.    Review of Systems  10 point ROS negative except as specified in HPI    Physical Exam  Constitutional:       Appearance: He is obese.      Comments: Needs set up for meal   HENT:      Head: Normocephalic and atraumatic.      Right Ear: External ear normal.      Left Ear: External ear normal.   Eyes:      Conjunctiva/sclera: Conjunctivae normal.   Cardiovascular:      Rate and Rhythm: Regular rhythm. Tachycardia present.   Pulmonary:      Effort: Pulmonary effort is normal.      Comments: Trach collar  Abdominal:      Palpations: Abdomen is soft.   Genitourinary:     Comments: Colostomy  Musculoskeletal:      Comments: Erbs palsy RUE.  Using LUE to eat   Skin:     General: Skin is warm and dry.   Neurological:      Mental Status: He is alert. Mental status is at baseline.   Psychiatric:         " "Behavior: Behavior normal.          Last Recorded Vitals  Blood pressure 143/87, pulse 100, temperature 37.1 °C (98.8 °F), temperature source Temporal, resp. rate (!) 29, height 1.803 m (5' 11\"), weight 99.8 kg (220 lb), SpO2 (!) 91%.    Relevant Results    Results for orders placed or performed during the hospital encounter of 07/01/25 (from the past 24 hours)   CBC and Auto Differential   Result Value Ref Range    WBC 12.6 (H) 4.4 - 11.3 x10*3/uL    nRBC 0.0 0.0 - 0.0 /100 WBCs    RBC 5.86 4.50 - 5.90 x10*6/uL    Hemoglobin 10.8 (L) 13.5 - 17.5 g/dL    Hematocrit 42.9 41.0 - 52.0 %    MCV 73 (L) 80 - 100 fL    MCH 18.4 (L) 26.0 - 34.0 pg    MCHC 25.2 (L) 32.0 - 36.0 g/dL    RDW 20.5 (H) 11.5 - 14.5 %    Platelets 354 150 - 450 x10*3/uL    Neutrophils % 72.9 40.0 - 80.0 %    Immature Granulocytes %, Automated 0.5 0.0 - 0.9 %    Lymphocytes % 14.4 13.0 - 44.0 %    Monocytes % 6.9 2.0 - 10.0 %    Eosinophils % 4.6 0.0 - 6.0 %    Basophils % 0.7 0.0 - 2.0 %    Neutrophils Absolute 9.20 (H) 1.20 - 7.70 x10*3/uL    Immature Granulocytes Absolute, Automated 0.06 0.00 - 0.70 x10*3/uL    Lymphocytes Absolute 1.82 1.20 - 4.80 x10*3/uL    Monocytes Absolute 0.87 0.10 - 1.00 x10*3/uL    Eosinophils Absolute 0.58 0.00 - 0.70 x10*3/uL    Basophils Absolute 0.09 0.00 - 0.10 x10*3/uL   Comprehensive metabolic panel   Result Value Ref Range    Glucose 141 (H) 74 - 99 mg/dL    Sodium 138 136 - 145 mmol/L    Potassium 4.2 3.5 - 5.3 mmol/L    Chloride 100 98 - 107 mmol/L    Bicarbonate 31 21 - 32 mmol/L    Anion Gap 11 10 - 20 mmol/L    Urea Nitrogen 20 6 - 23 mg/dL    Creatinine 1.07 0.50 - 1.30 mg/dL    eGFR 83 >60 mL/min/1.73m*2    Calcium 8.9 8.6 - 10.3 mg/dL    Albumin 4.2 3.4 - 5.0 g/dL    Alkaline Phosphatase 82 33 - 120 U/L    Total Protein 7.4 6.4 - 8.2 g/dL    AST 18 9 - 39 U/L    Bilirubin, Total 0.3 0.0 - 1.2 mg/dL    ALT 20 10 - 52 U/L   Magnesium   Result Value Ref Range    Magnesium 1.71 1.60 - 2.40 mg/dL   Urinalysis " with Reflex Culture and Microscopic   Result Value Ref Range    Color, Urine Light-Orange (N) Light-Yellow, Yellow, Dark-Yellow    Appearance, Urine Turbid (N) Clear    Specific Gravity, Urine 1.012 1.005 - 1.035    pH, Urine 7.0 5.0, 5.5, 6.0, 6.5, 7.0, 7.5, 8.0    Protein, Urine 100 (2+) (A) NEGATIVE, 10 (TRACE), 20 (TRACE) mg/dL    Glucose, Urine Normal Normal mg/dL    Blood, Urine 1.0 (3+) (A) NEGATIVE mg/dL    Ketones, Urine NEGATIVE NEGATIVE mg/dL    Bilirubin, Urine NEGATIVE NEGATIVE mg/dL    Urobilinogen, Urine Normal Normal mg/dL    Nitrite, Urine NEGATIVE NEGATIVE    Leukocyte Esterase, Urine 500 Mario/uL (A) NEGATIVE   Microscopic Only, Urine   Result Value Ref Range    WBC, Urine >50 (A) 1-5, NONE /HPF    WBC Clumps, Urine MANY Reference range not established. /HPF    RBC, Urine >20 (A) NONE, 1-2, 3-5 /HPF    Bacteria, Urine 1+ (A) NONE SEEN /HPF    Budding Yeast, Urine PRESENT (A) NONE /HPF     No results found.       Assessment & Plan  Malfunction of nephrostomy tube    54-year-old male presented to ED for evaluation of decreased urine output from nephrostomy tube for the past 2 days found to have suspected UTI per UA.  CT abdomen pelvis pending.  Patient admitted to observation with telemetry under Dr. Melendez for further evaluation and care.    #Malfunction of nephrostomy tube  #Rule out UTI  - Continue antibiotic started in ED, follow urine culture  - IR consult    Chronic conditions  #Cerebral palsy with spastic hemiplegia  #GERD  #Chronic pain  #Diabetes  #Hypertension  #Tachycardia  - Continue home meds when reconciled  - Cardiac/carb consistent diet    I spent 45 minutes in the professional and overall care of this patient.      Charles White PA-C         [1]   Past Medical History:  Diagnosis Date    Cerebral palsy     Kidney stones    [2]   Past Surgical History:  Procedure Laterality Date    IR  NEPHROSTOMY PLACEMENT  11/25/2023    IR  NEPHROSTOMY PLACEMENT 11/25/2023 Omid KUO  MD Thang [facility]   [3] No family history on file.

## 2025-07-03 ENCOUNTER — APPOINTMENT (OUTPATIENT)
Dept: RADIOLOGY | Facility: HOSPITAL | Age: 55
End: 2025-07-03
Payer: COMMERCIAL

## 2025-07-03 LAB
ALBUMIN SERPL BCP-MCNC: 3.9 G/DL (ref 3.4–5)
ALP SERPL-CCNC: 71 U/L (ref 33–120)
ALT SERPL W P-5'-P-CCNC: 17 U/L (ref 10–52)
ANION GAP SERPL CALC-SCNC: 11 MMOL/L (ref 10–20)
AST SERPL W P-5'-P-CCNC: 14 U/L (ref 9–39)
BASOPHILS # BLD AUTO: 0.06 X10*3/UL (ref 0–0.1)
BASOPHILS NFR BLD AUTO: 0.5 %
BILIRUB SERPL-MCNC: 0.4 MG/DL (ref 0–1.2)
BUN SERPL-MCNC: 18 MG/DL (ref 6–23)
CALCIUM SERPL-MCNC: 8.6 MG/DL (ref 8.6–10.3)
CHLORIDE SERPL-SCNC: 99 MMOL/L (ref 98–107)
CO2 SERPL-SCNC: 32 MMOL/L (ref 21–32)
CREAT SERPL-MCNC: 1.07 MG/DL (ref 0.5–1.3)
EGFRCR SERPLBLD CKD-EPI 2021: 83 ML/MIN/1.73M*2
EOSINOPHIL # BLD AUTO: 0.7 X10*3/UL (ref 0–0.7)
EOSINOPHIL NFR BLD AUTO: 6.2 %
ERYTHROCYTE [DISTWIDTH] IN BLOOD BY AUTOMATED COUNT: 20.4 % (ref 11.5–14.5)
GLUCOSE BLD MANUAL STRIP-MCNC: 148 MG/DL (ref 74–99)
GLUCOSE BLD MANUAL STRIP-MCNC: 149 MG/DL (ref 74–99)
GLUCOSE BLD MANUAL STRIP-MCNC: 153 MG/DL (ref 74–99)
GLUCOSE BLD MANUAL STRIP-MCNC: 227 MG/DL (ref 74–99)
GLUCOSE SERPL-MCNC: 139 MG/DL (ref 74–99)
HCT VFR BLD AUTO: 37.2 % (ref 41–52)
HGB BLD-MCNC: 9.9 G/DL (ref 13.5–17.5)
IMM GRANULOCYTES # BLD AUTO: 0.04 X10*3/UL (ref 0–0.7)
IMM GRANULOCYTES NFR BLD AUTO: 0.4 % (ref 0–0.9)
LYMPHOCYTES # BLD AUTO: 2.17 X10*3/UL (ref 1.2–4.8)
LYMPHOCYTES NFR BLD AUTO: 19.2 %
MCH RBC QN AUTO: 19.3 PG (ref 26–34)
MCHC RBC AUTO-ENTMCNC: 26.6 G/DL (ref 32–36)
MCV RBC AUTO: 72 FL (ref 80–100)
MONOCYTES # BLD AUTO: 1.08 X10*3/UL (ref 0.1–1)
MONOCYTES NFR BLD AUTO: 9.6 %
NEUTROPHILS # BLD AUTO: 7.23 X10*3/UL (ref 1.2–7.7)
NEUTROPHILS NFR BLD AUTO: 64.1 %
NRBC BLD-RTO: 0 /100 WBCS (ref 0–0)
PLATELET # BLD AUTO: 316 X10*3/UL (ref 150–450)
POTASSIUM SERPL-SCNC: 3.7 MMOL/L (ref 3.5–5.3)
PROT SERPL-MCNC: 7 G/DL (ref 6.4–8.2)
RBC # BLD AUTO: 5.14 X10*6/UL (ref 4.5–5.9)
SODIUM SERPL-SCNC: 138 MMOL/L (ref 136–145)
WBC # BLD AUTO: 11.3 X10*3/UL (ref 4.4–11.3)

## 2025-07-03 PROCEDURE — 94640 AIRWAY INHALATION TREATMENT: CPT

## 2025-07-03 PROCEDURE — 2500000005 HC RX 250 GENERAL PHARMACY W/O HCPCS: Performed by: INTERNAL MEDICINE

## 2025-07-03 PROCEDURE — 82947 ASSAY GLUCOSE BLOOD QUANT: CPT

## 2025-07-03 PROCEDURE — 0T25X0Z CHANGE DRAINAGE DEVICE IN KIDNEY, EXTERNAL APPROACH: ICD-10-PCS | Performed by: RADIOLOGY

## 2025-07-03 PROCEDURE — 2500000005 HC RX 250 GENERAL PHARMACY W/O HCPCS: Performed by: NURSE PRACTITIONER

## 2025-07-03 PROCEDURE — 2500000002 HC RX 250 W HCPCS SELF ADMINISTERED DRUGS (ALT 637 FOR MEDICARE OP, ALT 636 FOR OP/ED)

## 2025-07-03 PROCEDURE — 85025 COMPLETE CBC W/AUTO DIFF WBC: CPT | Performed by: INTERNAL MEDICINE

## 2025-07-03 PROCEDURE — 36415 COLL VENOUS BLD VENIPUNCTURE: CPT | Performed by: INTERNAL MEDICINE

## 2025-07-03 PROCEDURE — 84075 ASSAY ALKALINE PHOSPHATASE: CPT | Performed by: INTERNAL MEDICINE

## 2025-07-03 PROCEDURE — 2500000004 HC RX 250 GENERAL PHARMACY W/ HCPCS (ALT 636 FOR OP/ED): Mod: JZ

## 2025-07-03 PROCEDURE — 50431 NJX PX NFROSGRM &/URTRGRM: CPT

## 2025-07-03 PROCEDURE — 2500000001 HC RX 250 WO HCPCS SELF ADMINISTERED DRUGS (ALT 637 FOR MEDICARE OP)

## 2025-07-03 PROCEDURE — 2500000002 HC RX 250 W HCPCS SELF ADMINISTERED DRUGS (ALT 637 FOR MEDICARE OP, ALT 636 FOR OP/ED): Performed by: NURSE PRACTITIONER

## 2025-07-03 PROCEDURE — 1200000002 HC GENERAL ROOM WITH TELEMETRY DAILY

## 2025-07-03 PROCEDURE — 2500000001 HC RX 250 WO HCPCS SELF ADMINISTERED DRUGS (ALT 637 FOR MEDICARE OP): Performed by: NURSE PRACTITIONER

## 2025-07-03 PROCEDURE — 2500000004 HC RX 250 GENERAL PHARMACY W/ HCPCS (ALT 636 FOR OP/ED): Performed by: RADIOLOGY

## 2025-07-03 PROCEDURE — 2550000001 HC RX 255 CONTRASTS: Mod: JZ | Performed by: INTERNAL MEDICINE

## 2025-07-03 RX ORDER — SERTRALINE HYDROCHLORIDE 50 MG/1
50 TABLET, FILM COATED ORAL DAILY
Status: DISCONTINUED | OUTPATIENT
Start: 2025-07-03 | End: 2025-07-05 | Stop reason: HOSPADM

## 2025-07-03 RX ORDER — MIDAZOLAM HYDROCHLORIDE 1 MG/ML
INJECTION INTRAMUSCULAR; INTRAVENOUS
Status: COMPLETED | OUTPATIENT
Start: 2025-07-03 | End: 2025-07-03

## 2025-07-03 RX ORDER — ALBUTEROL SULFATE 0.83 MG/ML
2.5 SOLUTION RESPIRATORY (INHALATION) EVERY 4 HOURS PRN
Status: DISCONTINUED | OUTPATIENT
Start: 2025-07-03 | End: 2025-07-05 | Stop reason: HOSPADM

## 2025-07-03 RX ORDER — FOLIC ACID 1 MG/1
1 TABLET ORAL DAILY
Status: DISCONTINUED | OUTPATIENT
Start: 2025-07-03 | End: 2025-07-05 | Stop reason: HOSPADM

## 2025-07-03 RX ORDER — OXYCODONE AND ACETAMINOPHEN 5; 325 MG/1; MG/1
1 TABLET ORAL 2 TIMES DAILY
Status: DISCONTINUED | OUTPATIENT
Start: 2025-07-03 | End: 2025-07-05 | Stop reason: HOSPADM

## 2025-07-03 RX ORDER — FENTANYL CITRATE 50 UG/ML
INJECTION, SOLUTION INTRAMUSCULAR; INTRAVENOUS
Status: COMPLETED | OUTPATIENT
Start: 2025-07-03 | End: 2025-07-03

## 2025-07-03 RX ORDER — BUDESONIDE 0.5 MG/2ML
0.5 INHALANT ORAL
Status: DISCONTINUED | OUTPATIENT
Start: 2025-07-03 | End: 2025-07-05 | Stop reason: HOSPADM

## 2025-07-03 RX ORDER — OXYCODONE AND ACETAMINOPHEN 5; 325 MG/1; MG/1
1 TABLET ORAL EVERY 8 HOURS PRN
Status: DISCONTINUED | OUTPATIENT
Start: 2025-07-03 | End: 2025-07-05 | Stop reason: HOSPADM

## 2025-07-03 RX ORDER — LANOLIN ALCOHOL/MO/W.PET/CERES
100 CREAM (GRAM) TOPICAL DAILY
Status: DISCONTINUED | OUTPATIENT
Start: 2025-07-03 | End: 2025-07-05 | Stop reason: HOSPADM

## 2025-07-03 RX ORDER — METOPROLOL SUCCINATE 25 MG/1
25 TABLET, EXTENDED RELEASE ORAL DAILY
Status: DISCONTINUED | OUTPATIENT
Start: 2025-07-03 | End: 2025-07-05 | Stop reason: HOSPADM

## 2025-07-03 RX ORDER — CETIRIZINE HYDROCHLORIDE 10 MG/1
10 TABLET ORAL DAILY
Status: DISCONTINUED | OUTPATIENT
Start: 2025-07-03 | End: 2025-07-05 | Stop reason: HOSPADM

## 2025-07-03 RX ORDER — GUAIFENESIN/DEXTROMETHORPHAN 100-10MG/5
10 SYRUP ORAL EVERY 4 HOURS PRN
Status: DISCONTINUED | OUTPATIENT
Start: 2025-07-03 | End: 2025-07-05 | Stop reason: HOSPADM

## 2025-07-03 RX ORDER — PANTOPRAZOLE SODIUM 40 MG/1
40 TABLET, DELAYED RELEASE ORAL DAILY
Status: DISCONTINUED | OUTPATIENT
Start: 2025-07-03 | End: 2025-07-05 | Stop reason: HOSPADM

## 2025-07-03 RX ORDER — DOCUSATE SODIUM 100 MG/1
100 CAPSULE, LIQUID FILLED ORAL DAILY
Status: DISCONTINUED | OUTPATIENT
Start: 2025-07-03 | End: 2025-07-05 | Stop reason: HOSPADM

## 2025-07-03 RX ORDER — AMMONIUM LACTATE 12 G/100G
1 LOTION TOPICAL 2 TIMES DAILY
Status: DISCONTINUED | OUTPATIENT
Start: 2025-07-03 | End: 2025-07-05 | Stop reason: HOSPADM

## 2025-07-03 RX ADMIN — ACETAMINOPHEN 650 MG: 325 TABLET ORAL at 00:48

## 2025-07-03 RX ADMIN — OXYCODONE HYDROCHLORIDE AND ACETAMINOPHEN 1 TABLET: 5; 325 TABLET ORAL at 09:12

## 2025-07-03 RX ADMIN — PIPERACILLIN SODIUM AND TAZOBACTAM SODIUM 3.38 G: 3; .375 INJECTION, SOLUTION INTRAVENOUS at 12:16

## 2025-07-03 RX ADMIN — LACOSAMIDE 100 MG: 100 TABLET, FILM COATED ORAL at 20:27

## 2025-07-03 RX ADMIN — OXYCODONE HYDROCHLORIDE AND ACETAMINOPHEN 1 TABLET: 5; 325 TABLET ORAL at 20:26

## 2025-07-03 RX ADMIN — PIPERACILLIN SODIUM AND TAZOBACTAM SODIUM 3.38 G: 3; .375 INJECTION, SOLUTION INTRAVENOUS at 05:34

## 2025-07-03 RX ADMIN — APIXABAN 5 MG: 5 TABLET, FILM COATED ORAL at 20:27

## 2025-07-03 RX ADMIN — METOPROLOL SUCCINATE 25 MG: 25 TABLET, EXTENDED RELEASE ORAL at 09:12

## 2025-07-03 RX ADMIN — CYCLOBENZAPRINE 10 MG: 10 TABLET, FILM COATED ORAL at 09:12

## 2025-07-03 RX ADMIN — DOCUSATE SODIUM 100 MG: 100 CAPSULE, LIQUID FILLED ORAL at 09:12

## 2025-07-03 RX ADMIN — PSYLLIUM HUSK 1 PACKET: 3.4 POWDER ORAL at 09:12

## 2025-07-03 RX ADMIN — FENTANYL CITRATE 50 MCG: 50 INJECTION, SOLUTION INTRAMUSCULAR; INTRAVENOUS at 10:51

## 2025-07-03 RX ADMIN — BUDESONIDE 0.5 MG: 0.5 INHALANT RESPIRATORY (INHALATION) at 18:58

## 2025-07-03 RX ADMIN — MIDAZOLAM HYDROCHLORIDE 1 MG: 1 INJECTION, SOLUTION INTRAMUSCULAR; INTRAVENOUS at 10:51

## 2025-07-03 RX ADMIN — LEVETIRACETAM 750 MG: 500 TABLET, FILM COATED ORAL at 20:26

## 2025-07-03 RX ADMIN — LEVETIRACETAM 750 MG: 500 TABLET, FILM COATED ORAL at 09:12

## 2025-07-03 RX ADMIN — MUPIROCIN: 20 OINTMENT TOPICAL at 09:12

## 2025-07-03 RX ADMIN — IOHEXOL 10 ML: 350 INJECTION, SOLUTION INTRAVENOUS at 11:15

## 2025-07-03 RX ADMIN — INSULIN LISPRO 4 UNITS: 100 INJECTION, SOLUTION INTRAVENOUS; SUBCUTANEOUS at 16:43

## 2025-07-03 RX ADMIN — PIPERACILLIN SODIUM AND TAZOBACTAM SODIUM 3.38 G: 3; .375 INJECTION, SOLUTION INTRAVENOUS at 20:31

## 2025-07-03 RX ADMIN — LACOSAMIDE 100 MG: 100 TABLET, FILM COATED ORAL at 09:12

## 2025-07-03 RX ADMIN — BACLOFEN 5 MG: 10 TABLET ORAL at 20:26

## 2025-07-03 RX ADMIN — BUDESONIDE 0.5 MG: 0.5 INHALANT RESPIRATORY (INHALATION) at 06:38

## 2025-07-03 RX ADMIN — INSULIN LISPRO 2 UNITS: 100 INJECTION, SOLUTION INTRAVENOUS; SUBCUTANEOUS at 09:18

## 2025-07-03 RX ADMIN — BACLOFEN 5 MG: 10 TABLET ORAL at 05:34

## 2025-07-03 RX ADMIN — CYCLOBENZAPRINE 10 MG: 10 TABLET, FILM COATED ORAL at 20:26

## 2025-07-03 RX ADMIN — BACLOFEN 5 MG: 10 TABLET ORAL at 16:43

## 2025-07-03 RX ADMIN — PANTOPRAZOLE SODIUM 40 MG: 40 TABLET, DELAYED RELEASE ORAL at 05:34

## 2025-07-03 RX ADMIN — Medication 30 PERCENT: at 08:00

## 2025-07-03 RX ADMIN — APIXABAN 5 MG: 5 TABLET, FILM COATED ORAL at 09:12

## 2025-07-03 RX ADMIN — BACLOFEN 5 MG: 10 TABLET ORAL at 12:16

## 2025-07-03 RX ADMIN — SERTRALINE HYDROCHLORIDE 50 MG: 50 TABLET ORAL at 09:12

## 2025-07-03 RX ADMIN — PIPERACILLIN SODIUM AND TAZOBACTAM SODIUM 3.38 G: 3; .375 INJECTION, SOLUTION INTRAVENOUS at 00:43

## 2025-07-03 RX ADMIN — CETIRIZINE HYDROCHLORIDE 10 MG: 10 TABLET, FILM COATED ORAL at 09:12

## 2025-07-03 RX ADMIN — FOLIC ACID 1 MG: 1 TABLET ORAL at 09:12

## 2025-07-03 SDOH — SOCIAL STABILITY: SOCIAL INSECURITY: WITHIN THE LAST YEAR, HAVE YOU BEEN HUMILIATED OR EMOTIONALLY ABUSED IN OTHER WAYS BY YOUR PARTNER OR EX-PARTNER?: NO

## 2025-07-03 SDOH — ECONOMIC STABILITY: HOUSING INSECURITY: AT ANY TIME IN THE PAST 12 MONTHS, WERE YOU HOMELESS OR LIVING IN A SHELTER (INCLUDING NOW)?: PATIENT UNABLE TO ANSWER

## 2025-07-03 SDOH — SOCIAL STABILITY: SOCIAL INSECURITY: WITHIN THE LAST YEAR, HAVE YOU BEEN AFRAID OF YOUR PARTNER OR EX-PARTNER?: NO

## 2025-07-03 ASSESSMENT — PAIN - FUNCTIONAL ASSESSMENT
PAIN_FUNCTIONAL_ASSESSMENT: 0-10

## 2025-07-03 ASSESSMENT — COGNITIVE AND FUNCTIONAL STATUS - GENERAL
WALKING IN HOSPITAL ROOM: TOTAL
STANDING UP FROM CHAIR USING ARMS: TOTAL
CLIMB 3 TO 5 STEPS WITH RAILING: TOTAL
DAILY ACTIVITIY SCORE: 12
CLIMB 3 TO 5 STEPS WITH RAILING: TOTAL
TOILETING: A LOT
DRESSING REGULAR LOWER BODY CLOTHING: TOTAL
MOVING FROM LYING ON BACK TO SITTING ON SIDE OF FLAT BED WITH BEDRAILS: A LOT
DRESSING REGULAR UPPER BODY CLOTHING: TOTAL
PERSONAL GROOMING: A LITTLE
PERSONAL GROOMING: A LOT
STANDING UP FROM CHAIR USING ARMS: TOTAL
MOVING TO AND FROM BED TO CHAIR: TOTAL
HELP NEEDED FOR BATHING: TOTAL
TOILETING: A LITTLE
MOBILITY SCORE: 7
MOVING FROM LYING ON BACK TO SITTING ON SIDE OF FLAT BED WITH BEDRAILS: A LOT
HELP NEEDED FOR BATHING: TOTAL
DRESSING REGULAR LOWER BODY CLOTHING: TOTAL
DAILY ACTIVITIY SCORE: 11
WALKING IN HOSPITAL ROOM: TOTAL
DRESSING REGULAR UPPER BODY CLOTHING: TOTAL
MOVING TO AND FROM BED TO CHAIR: TOTAL
TURNING FROM BACK TO SIDE WHILE IN FLAT BAD: TOTAL
MOBILITY SCORE: 7
EATING MEALS: A LITTLE
TURNING FROM BACK TO SIDE WHILE IN FLAT BAD: TOTAL

## 2025-07-03 ASSESSMENT — ACTIVITIES OF DAILY LIVING (ADL)
LACK_OF_TRANSPORTATION: PATIENT UNABLE TO ANSWER
LACK_OF_TRANSPORTATION: PATIENT UNABLE TO ANSWER

## 2025-07-03 ASSESSMENT — PAIN SCALES - GENERAL
PAINLEVEL_OUTOF10: 2
PAINLEVEL_OUTOF10: 5 - MODERATE PAIN
PAINLEVEL_OUTOF10: 0 - NO PAIN
PAINLEVEL_OUTOF10: 3
PAINLEVEL_OUTOF10: 0 - NO PAIN
PAINLEVEL_OUTOF10: 4

## 2025-07-03 ASSESSMENT — PAIN DESCRIPTION - DESCRIPTORS: DESCRIPTORS: SPASM

## 2025-07-03 NOTE — PROGRESS NOTES
07/03/25 1214   Discharge Planning   Living Arrangements Alone   Support Systems Family members   Type of Residence Nursing home/residential care   Do you have animals or pets at home? No   Who is requesting discharge planning? Provider   Expected Discharge Disposition Inter   Stroke Family Assessment   Stroke Family Assessment Needed No   Intensity of Service   Intensity of Service 0-30 min     Met with pt this early afternoon, pt resides at [facility],  admit for malfunction of nephro tubes, pt  told me that he has been there for 3 yrs.  Pt will return at discharge, will  have team make a return referral and see if pt is bed hold.   Lyric Collins RN [facility]

## 2025-07-03 NOTE — PRE-PROCEDURE NOTE
Interventional Radiology Preprocedure Note    Indication for procedure: The primary encounter diagnosis was Malfunction of nephrostomy tube. A diagnosis of Acute UTI was also pertinent to this visit.    Relevant review of systems: NA    Relevant Labs:   Lab Results   Component Value Date    CREATININE 1.07 07/03/2025    EGFR 83 07/03/2025    INR 1.3 (H) 11/25/2023    PROTIME 14.4 (H) 11/25/2023       Planned Sedation/Anesthesia: Moderate    Airway assessment: normal    Directed physical examination:    Aox3  No increased work of breathing.   No acute distress      Mallampati: tracheostomy    ASA Score: 3    Benefits, risks and alternatives of procedure and planned sedation have been discussed with the patient and/or their representative. All questions answered and they agree to proceed.

## 2025-07-03 NOTE — CARE PLAN
The patient's goals for the shift include      The clinical goals for the shift include        Problem: Skin  Goal: Decreased wound size/increased tissue granulation at next dressing change  Outcome: Progressing  Goal: Participates in plan/prevention/treatment measures  Outcome: Progressing  Goal: Prevent/manage excess moisture  Outcome: Progressing  Flowsheets (Taken 7/3/2025 0101)  Prevent/manage excess moisture: Moisturize dry skin  Goal: Prevent/minimize sheer/friction injuries  Outcome: Progressing  Goal: Promote/optimize nutrition  Outcome: Progressing  Goal: Promote skin healing  Outcome: Progressing     Problem: Pain - Adult  Goal: Verbalizes/displays adequate comfort level or baseline comfort level  Outcome: Progressing     Problem: Safety - Adult  Goal: Free from fall injury  Outcome: Progressing     Problem: Discharge Planning  Goal: Discharge to home or other facility with appropriate resources  Outcome: Progressing     Problem: Chronic Conditions and Co-morbidities  Goal: Patient's chronic conditions and co-morbidity symptoms are monitored and maintained or improved  Outcome: Progressing     Problem: Nutrition  Goal: Nutrient intake appropriate for maintaining nutritional needs  Outcome: Progressing

## 2025-07-03 NOTE — PROGRESS NOTES
Dionte Sharpe is a 54 y.o. male on day 1 of admission presenting with Malfunction of nephrostomy tube.      Subjective   7/3/25: Patient seen and fully examined at bedside, patient ill appearing, acutely complex, marked decline from baseline. Patient remains hospitalized for acute onset with complex medical and pharmacological management, malfunction of nephrostomy tube, IR consulted for replacement. Will continue antibiotics for sepsis, leukocytosis. WBC is now normal @ 11.3 from 12.6. Will continue with empiric coverage, cultures final results show enteric matti.  Hemoglobin @ 9.9 from 9.5. Continue to monitor overnight and repeat labs in the morning. Slow but progressive improvements noted. High Complexity with updates to multiple problems, adjustments to treatment plan, and need for ongoing inpatient care. Long discussion on goals of care with patient and family, will continue current and await diagnostic findings. Patient reports: no new complaints, decline from baseline, weakness        Objective     Last Recorded Vitals  /69   Pulse 101   Temp 36.9 °C (98.4 °F)   Resp 18   Wt 99.8 kg (220 lb)   SpO2 95%   Intake/Output last 3 Shifts:    Intake/Output Summary (Last 24 hours) at 7/3/2025 1605  Last data filed at 7/3/2025 0043  Gross per 24 hour   Intake 550 ml   Output 600 ml   Net -50 ml       Admission Weight  Weight: 99.8 kg (220 lb) (07/01/25 1634)    Daily Weight  07/01/25 : 99.8 kg (220 lb)    Image Results  CT chest abdomen pelvis wo IV contrast  Narrative: STUDY:  CT Chest, Abdomen, and Pelvis without IV Contrast; 7/1/2025 at 9:01 PM  INDICATION:  Nephrostomy tube, trach.  COMPARISON:  CTA chest 2/10/2023, CT chest, abdomen and pelvis 5/22/2022.  ACCESSION NUMBER(S):  [id number]  ORDERING CLINICIAN:  GONSALO RAM  TECHNIQUE:  CT of the chest, abdomen, and pelvis was performed.  Contiguous axial  images were obtained at 3 mm slice thickness through the chest,  abdomen, and pelvis.   Coronal and sagittal reconstructions at 3 mm  slice thickness were performed.  No intravenous contrast was  administered.     FINDINGS:  Please note that the evaluation of vessels, lymph nodes and organs is  limited without intravenous contrast.   CHEST:  Thyroid: No acute pathology  Cardiac: The heart is normal in size.  No pericardial effusion.  The coronary arteries and associated branching segments demonstrate no  acute pathology.  Pulmonary / Airways: Evaluation limited due to diminished inspiratory  effort.  There is general mosaicism attenuation and bilateral lower  lobe atelectatic changes.  No focal consolidation, or pleural  effusion.  There is no pneumothorax.  A tracheostomy tube is in proper  position..  Pleura: No acute pathology  Mediastinal: No suspicious adenopathy.  Vasculature: No aneurysm of the intrathoracic aorta.  There is  conventional aortic arch anatomy with type I configuration.  Extrathoracic: No acute pathology.  ABDOMEN/PELVIS  Hepatobiliary: No acute pathology of the liver or gallbladder.  Spleen: No acute pathology.  Pancreas: No acute pathology.  Adrenals: Generalized parenchymal atrophy bilaterally.  Kidneys/Urinary bladder: A left percutaneous nephrostomy tube noted to  be in proper position with the pigtail within the central collecting  system.  Large, nonobstructing renal calculi noted in the bilateral  renal collecting system.  No hydronephrosis.  Calcification layering in the dependent portion of the fluid-filled  urinary bladder.  Gastrointestinal tract: Prominence of stool in the rectal vault  suggesting constipation.  Additionally area of focal rounded  hyperdense fecal material within the mid transverse colon with  generalized decompression of the distal transverse into descending  colon.  This raises the possibility for a fecal bezoar.  Right lower  quadrant colostomy noted to be intact.  Vasculature: No acute pathology.  Lymph nodes: No acute  pathology.  Mesentery/Peritoneum: No acute pathology.  Pelvis/Reproductive Organs: No acute pathology.  Osseous/Miscellaneous: No acute pathology.  Generalized loss of domain  of the anterior abdominal wall with a large small bowel containing  hernia noted  Impression: *Tracheostomy tube in proper position  *Left percutaneous nephrostomy tube in proper position  *Additional, nonacute imaging findings as described above.  Signed by Tisha Rutherford MD      Physical Exam    General: in no acute distress  Eyes: PERRLA   HENT: Normo-cephalic, atraumatic.   CV: Regular rate, regular rhythm.   Resp: Breathing non-labored,  diminished to auscultation bilaterally  GI: BS x4   : monitor intake and output  MSK/Extremities: No gross bony deformities. PT/OT on the case  Skin: Warm. Appropriate color, continue offloading  Neuro:  Face symmetric.   Psych: returning to baseline, improved     10 point ROS negative unless otherwise noted in HPI    Patient recently received an antibiotic (last 12 hours)       Date/Time Action Medication Dose    07/03/25 1216 New Bag    piperacillin-tazobactam (Zosyn) 3.375 g in dextrose (iso) IV 50 mL 3.375 g    07/03/25 0912 Given    mupirocin (Bactroban) 2 % ointment     07/03/25 0534 New Bag    piperacillin-tazobactam (Zosyn) 3.375 g in dextrose (iso) IV 50 mL 3.375 g                 Patient fully evaluated 7/3/25  for    Problem List Items Addressed This Visit          Genitourinary and Reproductive    Acute UTI    * (Principal) Malfunction of nephrostomy tube - Primary     Brought to hospital - had concerns including Flank Pain.  Patient seen resting in bed with head of bed elevated, no s/s or c/o acute difficulties at this time.  Vital signs for last 24 hours Temp:  [36.7 °C (98.1 °F)-37.6 °C (99.7 °F)] 36.9 °C (98.4 °F)  Heart Rate:  [101-115] 101  Resp:  [17-26] 18  BP: (113-157)/(69-90) 113/69  FiO2 (%):  [30 %] 30 %    No intake/output data recorded.  Patient still requiring frequent cardiac  and SPO2 monitoring. Continue aggressive pulmonary hygiene and oral hygiene. Off loading as tolerated for skin integrity. Medications and labs reviewed-   Results for orders placed or performed during the hospital encounter of 07/01/25 (from the past 24 hours)   POCT GLUCOSE   Result Value Ref Range    POCT Glucose 190 (H) 74 - 99 mg/dL   POCT GLUCOSE   Result Value Ref Range    POCT Glucose 205 (H) 74 - 99 mg/dL   CBC and Auto Differential   Result Value Ref Range    WBC 11.3 4.4 - 11.3 x10*3/uL    nRBC 0.0 0.0 - 0.0 /100 WBCs    RBC 5.14 4.50 - 5.90 x10*6/uL    Hemoglobin 9.9 (L) 13.5 - 17.5 g/dL    Hematocrit 37.2 (L) 41.0 - 52.0 %    MCV 72 (L) 80 - 100 fL    MCH 19.3 (L) 26.0 - 34.0 pg    MCHC 26.6 (L) 32.0 - 36.0 g/dL    RDW 20.4 (H) 11.5 - 14.5 %    Platelets 316 150 - 450 x10*3/uL    Neutrophils % 64.1 40.0 - 80.0 %    Immature Granulocytes %, Automated 0.4 0.0 - 0.9 %    Lymphocytes % 19.2 13.0 - 44.0 %    Monocytes % 9.6 2.0 - 10.0 %    Eosinophils % 6.2 0.0 - 6.0 %    Basophils % 0.5 0.0 - 2.0 %    Neutrophils Absolute 7.23 1.20 - 7.70 x10*3/uL    Immature Granulocytes Absolute, Automated 0.04 0.00 - 0.70 x10*3/uL    Lymphocytes Absolute 2.17 1.20 - 4.80 x10*3/uL    Monocytes Absolute 1.08 (H) 0.10 - 1.00 x10*3/uL    Eosinophils Absolute 0.70 0.00 - 0.70 x10*3/uL    Basophils Absolute 0.06 0.00 - 0.10 x10*3/uL   Comprehensive Metabolic Panel   Result Value Ref Range    Glucose 139 (H) 74 - 99 mg/dL    Sodium 138 136 - 145 mmol/L    Potassium 3.7 3.5 - 5.3 mmol/L    Chloride 99 98 - 107 mmol/L    Bicarbonate 32 21 - 32 mmol/L    Anion Gap 11 10 - 20 mmol/L    Urea Nitrogen 18 6 - 23 mg/dL    Creatinine 1.07 0.50 - 1.30 mg/dL    eGFR 83 >60 mL/min/1.73m*2    Calcium 8.6 8.6 - 10.3 mg/dL    Albumin 3.9 3.4 - 5.0 g/dL    Alkaline Phosphatase 71 33 - 120 U/L    Total Protein 7.0 6.4 - 8.2 g/dL    AST 14 9 - 39 U/L    Bilirubin, Total 0.4 0.0 - 1.2 mg/dL    ALT 17 10 - 52 U/L   POCT GLUCOSE   Result Value Ref  Range    POCT Glucose 153 (H) 74 - 99 mg/dL   POCT GLUCOSE   Result Value Ref Range    POCT Glucose 148 (H) 74 - 99 mg/dL      Patient recently received an antibiotic (last 12 hours)       Date/Time Action Medication Dose    07/03/25 1216 New Bag    piperacillin-tazobactam (Zosyn) 3.375 g in dextrose (iso) IV 50 mL 3.375 g    07/03/25 0912 Given    mupirocin (Bactroban) 2 % ointment     07/03/25 0534 New Bag    piperacillin-tazobactam (Zosyn) 3.375 g in dextrose (iso) IV 50 mL 3.375 g           Plan discussed with interdisciplinary team, continue current and repeat labs in the AM.       Discharge planning discussed with patient and care team. Therapy evaluations ordered.     Patient aware and agreeable to current plan, continue plan as above.     I spent a total of 60 minutes on the date of the service which included preparing to see the patient, face-to-face patient care, completing clinical documentation, obtaining and/or reviewing separately obtained history, performing a medically appropriate examination, counseling and educating the patient/family/caregiver, ordering medications, tests, or procedures, communicating with other HCPs (not separately reported), independently interpreting results (not separately reported), communicating results to the patient/family/caregiver, and care coordination (not separately reported).   Relevant Results               This patient currently has cardiac telemetry ordered; if you would like to modify or discontinue the telemetry order, click here to go to the orders activity to modify/discontinue the order.              Assessment & Plan  Malfunction of nephrostomy tube               SHANDRA WALKER

## 2025-07-03 NOTE — CARE PLAN
The patient's goals for the shift include  get neph tube changed out    The clinical goals for the shift include safety and rest    Pt awake in bed. Trach to airvo. No acute distress noted or complaints. Will continue to monitor.

## 2025-07-03 NOTE — PROCEDURES
Interventional Radiology Brief Postprocedure Note    Attending: Jose Dean MD    Assistant:   Staff Role   Rosemary Ruggiero MT Radiology Technologist   Tavia Torres, MT Radiology Technologist   Gabby Osborne, [facility] Radiology Nurse   Jose Dean MD Radiologist       Diagnosis:   1. Malfunction of nephrostomy tube        2. Acute UTI            Description of procedure: widelypatent ureter and contrast drainage into bladder. The left PCN can likely be removed. Recommend  consult and possible removal.     Timeout:  Yes    Procedure Area: Procedure Area     Anesthesia:   Conscious Sedation    Complications: None    Estimated Blood Loss: minimal    Medications (Filter: Administrations occurring from 1101 to 1101 on 07/03/25) As of 07/03/25 1101      None          No specimens collected      See detailed result report with images in PACS.    The patient tolerated the procedure well without incident or complication and is in stable condition.

## 2025-07-03 NOTE — CONSULTS
Dietitian consult ordered per nursing screen, MST=2 for unsure of weight loss.  Pt laying in bed at visit, currently NPO for exchange of nephrostomy tube.  Pt reports good appetite, denies chewing or swallowing problems.  Pt denies any unintentional weight loss, usually weighs 220lbs, lowest he has ever weighed 210lbs.  No wounds noted. Has colostomy.  Pt had not diet related questions. No muscle wasting or fat loss per NFPE.  Pt requesting liberal diet. Would suggest 75gm carb controlled diet once diet able to be resumed.  Full nutrition assessment not indicated at this time.  Pt to be re-screened for LOS day 10 if still admitted. Please re-consult RD for changes in nutrition status or need for RD sooner than anticipated re-screening.

## 2025-07-04 LAB
ALBUMIN SERPL BCP-MCNC: 3.9 G/DL (ref 3.4–5)
ALP SERPL-CCNC: 65 U/L (ref 33–120)
ALT SERPL W P-5'-P-CCNC: 17 U/L (ref 10–52)
ANION GAP SERPL CALC-SCNC: 10 MMOL/L (ref 10–20)
AST SERPL W P-5'-P-CCNC: 15 U/L (ref 9–39)
BASOPHILS # BLD AUTO: 0.09 X10*3/UL (ref 0–0.1)
BASOPHILS NFR BLD AUTO: 0.8 %
BILIRUB SERPL-MCNC: 0.4 MG/DL (ref 0–1.2)
BUN SERPL-MCNC: 18 MG/DL (ref 6–23)
CALCIUM SERPL-MCNC: 8.2 MG/DL (ref 8.6–10.3)
CHLORIDE SERPL-SCNC: 98 MMOL/L (ref 98–107)
CO2 SERPL-SCNC: 32 MMOL/L (ref 21–32)
CREAT SERPL-MCNC: 1.02 MG/DL (ref 0.5–1.3)
EGFRCR SERPLBLD CKD-EPI 2021: 88 ML/MIN/1.73M*2
EOSINOPHIL # BLD AUTO: 0.74 X10*3/UL (ref 0–0.7)
EOSINOPHIL NFR BLD AUTO: 6.2 %
ERYTHROCYTE [DISTWIDTH] IN BLOOD BY AUTOMATED COUNT: 20.1 % (ref 11.5–14.5)
GLUCOSE BLD MANUAL STRIP-MCNC: 147 MG/DL (ref 74–99)
GLUCOSE BLD MANUAL STRIP-MCNC: 177 MG/DL (ref 74–99)
GLUCOSE BLD MANUAL STRIP-MCNC: 208 MG/DL (ref 74–99)
GLUCOSE BLD MANUAL STRIP-MCNC: 232 MG/DL (ref 74–99)
GLUCOSE SERPL-MCNC: 207 MG/DL (ref 74–99)
HCT VFR BLD AUTO: 37.6 % (ref 41–52)
HGB BLD-MCNC: 9.4 G/DL (ref 13.5–17.5)
IMM GRANULOCYTES # BLD AUTO: 0.06 X10*3/UL (ref 0–0.7)
IMM GRANULOCYTES NFR BLD AUTO: 0.5 % (ref 0–0.9)
LYMPHOCYTES # BLD AUTO: 2.41 X10*3/UL (ref 1.2–4.8)
LYMPHOCYTES NFR BLD AUTO: 20.3 %
MCH RBC QN AUTO: 18.3 PG (ref 26–34)
MCHC RBC AUTO-ENTMCNC: 25 G/DL (ref 32–36)
MCV RBC AUTO: 73 FL (ref 80–100)
MONOCYTES # BLD AUTO: 0.93 X10*3/UL (ref 0.1–1)
MONOCYTES NFR BLD AUTO: 7.8 %
NEUTROPHILS # BLD AUTO: 7.67 X10*3/UL (ref 1.2–7.7)
NEUTROPHILS NFR BLD AUTO: 64.4 %
NRBC BLD-RTO: 0 /100 WBCS (ref 0–0)
PLATELET # BLD AUTO: 340 X10*3/UL (ref 150–450)
POTASSIUM SERPL-SCNC: 4 MMOL/L (ref 3.5–5.3)
PROT SERPL-MCNC: 6.9 G/DL (ref 6.4–8.2)
RBC # BLD AUTO: 5.14 X10*6/UL (ref 4.5–5.9)
SODIUM SERPL-SCNC: 136 MMOL/L (ref 136–145)
WBC # BLD AUTO: 11.9 X10*3/UL (ref 4.4–11.3)

## 2025-07-04 PROCEDURE — 2500000004 HC RX 250 GENERAL PHARMACY W/ HCPCS (ALT 636 FOR OP/ED): Mod: JZ

## 2025-07-04 PROCEDURE — 2500000001 HC RX 250 WO HCPCS SELF ADMINISTERED DRUGS (ALT 637 FOR MEDICARE OP): Performed by: NURSE PRACTITIONER

## 2025-07-04 PROCEDURE — 85025 COMPLETE CBC W/AUTO DIFF WBC: CPT | Performed by: INTERNAL MEDICINE

## 2025-07-04 PROCEDURE — 2500000005 HC RX 250 GENERAL PHARMACY W/O HCPCS: Performed by: INTERNAL MEDICINE

## 2025-07-04 PROCEDURE — 80053 COMPREHEN METABOLIC PANEL: CPT | Performed by: INTERNAL MEDICINE

## 2025-07-04 PROCEDURE — 36415 COLL VENOUS BLD VENIPUNCTURE: CPT | Performed by: INTERNAL MEDICINE

## 2025-07-04 PROCEDURE — 82947 ASSAY GLUCOSE BLOOD QUANT: CPT

## 2025-07-04 PROCEDURE — 2500000001 HC RX 250 WO HCPCS SELF ADMINISTERED DRUGS (ALT 637 FOR MEDICARE OP)

## 2025-07-04 PROCEDURE — 1200000002 HC GENERAL ROOM WITH TELEMETRY DAILY

## 2025-07-04 PROCEDURE — 2500000002 HC RX 250 W HCPCS SELF ADMINISTERED DRUGS (ALT 637 FOR MEDICARE OP, ALT 636 FOR OP/ED): Performed by: NURSE PRACTITIONER

## 2025-07-04 PROCEDURE — 2500000005 HC RX 250 GENERAL PHARMACY W/O HCPCS: Performed by: NURSE PRACTITIONER

## 2025-07-04 PROCEDURE — 2500000002 HC RX 250 W HCPCS SELF ADMINISTERED DRUGS (ALT 637 FOR MEDICARE OP, ALT 636 FOR OP/ED)

## 2025-07-04 PROCEDURE — 94640 AIRWAY INHALATION TREATMENT: CPT

## 2025-07-04 RX ORDER — DOCUSATE SODIUM 100 MG/1
100 CAPSULE, LIQUID FILLED ORAL DAILY
Start: 2025-07-04

## 2025-07-04 RX ORDER — BACILLUS COAGULANS 1B CELL
1 CAPSULE ORAL DAILY
Qty: 30 CAPSULE | Refills: 0 | Status: SHIPPED | OUTPATIENT
Start: 2025-07-04 | End: 2025-08-03

## 2025-07-04 RX ORDER — MUPIROCIN 20 MG/G
OINTMENT TOPICAL 2 TIMES DAILY
Start: 2025-07-04

## 2025-07-04 RX ORDER — AMOXICILLIN AND CLAVULANATE POTASSIUM 875; 125 MG/1; MG/1
1 TABLET, FILM COATED ORAL 2 TIMES DAILY
Start: 2025-07-04 | End: 2025-07-14

## 2025-07-04 RX ADMIN — PIPERACILLIN SODIUM AND TAZOBACTAM SODIUM 3.38 G: 3; .375 INJECTION, SOLUTION INTRAVENOUS at 08:30

## 2025-07-04 RX ADMIN — PANTOPRAZOLE SODIUM 40 MG: 40 TABLET, DELAYED RELEASE ORAL at 06:23

## 2025-07-04 RX ADMIN — CETIRIZINE HYDROCHLORIDE 10 MG: 10 TABLET, FILM COATED ORAL at 08:35

## 2025-07-04 RX ADMIN — METOPROLOL SUCCINATE 25 MG: 25 TABLET, EXTENDED RELEASE ORAL at 08:36

## 2025-07-04 RX ADMIN — Medication 100 MG: at 08:35

## 2025-07-04 RX ADMIN — MUPIROCIN: 20 OINTMENT TOPICAL at 08:34

## 2025-07-04 RX ADMIN — Medication 1 APPLICATION: at 08:33

## 2025-07-04 RX ADMIN — Medication 30 L/MIN: at 06:50

## 2025-07-04 RX ADMIN — SERTRALINE HYDROCHLORIDE 50 MG: 50 TABLET ORAL at 08:36

## 2025-07-04 RX ADMIN — CYCLOBENZAPRINE 10 MG: 10 TABLET, FILM COATED ORAL at 20:40

## 2025-07-04 RX ADMIN — Medication 1 APPLICATION: at 00:17

## 2025-07-04 RX ADMIN — OXYCODONE HYDROCHLORIDE AND ACETAMINOPHEN 1 TABLET: 5; 325 TABLET ORAL at 20:40

## 2025-07-04 RX ADMIN — BACLOFEN 5 MG: 10 TABLET ORAL at 17:02

## 2025-07-04 RX ADMIN — BACLOFEN 5 MG: 10 TABLET ORAL at 06:23

## 2025-07-04 RX ADMIN — PIPERACILLIN SODIUM AND TAZOBACTAM SODIUM 3.38 G: 3; .375 INJECTION, SOLUTION INTRAVENOUS at 14:25

## 2025-07-04 RX ADMIN — BACLOFEN 5 MG: 10 TABLET ORAL at 12:02

## 2025-07-04 RX ADMIN — DOCUSATE SODIUM 100 MG: 100 CAPSULE, LIQUID FILLED ORAL at 08:35

## 2025-07-04 RX ADMIN — BACLOFEN 5 MG: 10 TABLET ORAL at 20:40

## 2025-07-04 RX ADMIN — APIXABAN 5 MG: 5 TABLET, FILM COATED ORAL at 08:35

## 2025-07-04 RX ADMIN — LEVETIRACETAM 750 MG: 500 TABLET, FILM COATED ORAL at 08:36

## 2025-07-04 RX ADMIN — FOLIC ACID 1 MG: 1 TABLET ORAL at 08:35

## 2025-07-04 RX ADMIN — LACOSAMIDE 100 MG: 100 TABLET, FILM COATED ORAL at 08:35

## 2025-07-04 RX ADMIN — BUDESONIDE 0.5 MG: 0.5 INHALANT RESPIRATORY (INHALATION) at 06:46

## 2025-07-04 RX ADMIN — PIPERACILLIN SODIUM AND TAZOBACTAM SODIUM 3.38 G: 3; .375 INJECTION, SOLUTION INTRAVENOUS at 03:05

## 2025-07-04 RX ADMIN — INSULIN LISPRO 4 UNITS: 100 INJECTION, SOLUTION INTRAVENOUS; SUBCUTANEOUS at 12:02

## 2025-07-04 RX ADMIN — INSULIN LISPRO 2 UNITS: 100 INJECTION, SOLUTION INTRAVENOUS; SUBCUTANEOUS at 08:32

## 2025-07-04 RX ADMIN — PSYLLIUM HUSK 1 PACKET: 3.4 POWDER ORAL at 08:33

## 2025-07-04 RX ADMIN — ALBUTEROL SULFATE 2.5 MG: 2.5 SOLUTION RESPIRATORY (INHALATION) at 06:46

## 2025-07-04 RX ADMIN — Medication 30 L/MIN: at 20:41

## 2025-07-04 RX ADMIN — MUPIROCIN: 20 OINTMENT TOPICAL at 20:39

## 2025-07-04 RX ADMIN — APIXABAN 5 MG: 5 TABLET, FILM COATED ORAL at 20:40

## 2025-07-04 RX ADMIN — LACOSAMIDE 100 MG: 100 TABLET, FILM COATED ORAL at 20:40

## 2025-07-04 RX ADMIN — Medication 30 L/MIN: at 00:17

## 2025-07-04 RX ADMIN — PIPERACILLIN SODIUM AND TAZOBACTAM SODIUM 3.38 G: 3; .375 INJECTION, SOLUTION INTRAVENOUS at 20:39

## 2025-07-04 RX ADMIN — BUDESONIDE 0.5 MG: 0.5 INHALANT RESPIRATORY (INHALATION) at 18:34

## 2025-07-04 RX ADMIN — Medication 1 APPLICATION: at 20:41

## 2025-07-04 RX ADMIN — LEVETIRACETAM 750 MG: 500 TABLET, FILM COATED ORAL at 20:40

## 2025-07-04 ASSESSMENT — COGNITIVE AND FUNCTIONAL STATUS - GENERAL
STANDING UP FROM CHAIR USING ARMS: TOTAL
EATING MEALS: A LITTLE
TURNING FROM BACK TO SIDE WHILE IN FLAT BAD: TOTAL
MOVING FROM LYING ON BACK TO SITTING ON SIDE OF FLAT BED WITH BEDRAILS: A LOT
DAILY ACTIVITIY SCORE: 12
DRESSING REGULAR LOWER BODY CLOTHING: TOTAL
PERSONAL GROOMING: A LITTLE
WALKING IN HOSPITAL ROOM: TOTAL
MOBILITY SCORE: 7
MOVING TO AND FROM BED TO CHAIR: TOTAL
DRESSING REGULAR UPPER BODY CLOTHING: TOTAL
HELP NEEDED FOR BATHING: TOTAL
CLIMB 3 TO 5 STEPS WITH RAILING: TOTAL
TOILETING: A LITTLE

## 2025-07-04 ASSESSMENT — ENCOUNTER SYMPTOMS
HEMATURIA: 0
ABDOMINAL PAIN: 0
DYSURIA: 0

## 2025-07-04 ASSESSMENT — PAIN SCALES - GENERAL
PAINLEVEL_OUTOF10: 0 - NO PAIN
PAINLEVEL_OUTOF10: 0 - NO PAIN

## 2025-07-04 ASSESSMENT — PAIN - FUNCTIONAL ASSESSMENT: PAIN_FUNCTIONAL_ASSESSMENT: 0-10

## 2025-07-04 NOTE — CONSULTS
"Reason For Consult  Urinary obstruction    History Of Present Illness  Dionte Sharpe is a 54 y.o. male presenting with decreased output from nephrostomy tube. Medical history notable for history of cerebral palsy with spastic hemiplegia, right-sided contractures, colostomy, bilateral kidney stones with nephrostomy tube, chronic trach.     Reviewed note from Dr Garcia during last admission 10/21/24. It is  noted patient has had PCNT for 8+ years, has been followed by Galen Weaver and Johnny @ [facility]. Dr Garcia noted \"complex stone management should be done at a tertiary facility. He likely needs percutaneous nephrostolithotomies which cannot be done at either [facility] or [facility]. Both institutions lack the equipment which was moved from [facility] to [facility]\". Patient has seen Dr Irving of  urology in the past, ~ 2 years ago.    CT A&P showed L PCNT in proper position; large nonobstructing renal calculi in bilateral collecting systems, no hydro. IR exchanged L PCNT 7/3, noted a widely patent ureter with contrast drainage into the bladder.     Upon consultation patient reports he is doing well. Tolerating PCNT well. No abdominal or flank pain.  No hematuria.     Past Medical History  He has a past medical history of Cerebral palsy and Kidney stones.    Surgical History  He has a past surgical history that includes IR placement of nephrostomy catheter (11/25/2023).     Social History  He reports that he has quit smoking. His smoking use included cigarettes. He has never used smokeless tobacco. He reports that he does not currently use alcohol. No history on file for drug use.    Family History  Family History[1]     Allergies  Patient has no known allergies.    Review of Systems   Gastrointestinal:  Negative for abdominal pain.   Genitourinary:  Negative for dysuria and hematuria.         Physical Exam  No acute distress  Nephrostomy to CD with clear yellow urine    Current Medications[2]     Last Recorded " "Vitals  Blood pressure (!) 158/93, pulse 94, temperature 36.2 °C (97.2 °F), temperature source Temporal, resp. rate 18, height 1.803 m (5' 11\"), weight 99.8 kg (220 lb), SpO2 92%.    Relevant Results  Results for orders placed or performed during the hospital encounter of 07/01/25 (from the past 96 hours)   CBC and Auto Differential   Result Value Ref Range    WBC 12.6 (H) 4.4 - 11.3 x10*3/uL    nRBC 0.0 0.0 - 0.0 /100 WBCs    RBC 5.86 4.50 - 5.90 x10*6/uL    Hemoglobin 10.8 (L) 13.5 - 17.5 g/dL    Hematocrit 42.9 41.0 - 52.0 %    MCV 73 (L) 80 - 100 fL    MCH 18.4 (L) 26.0 - 34.0 pg    MCHC 25.2 (L) 32.0 - 36.0 g/dL    RDW 20.5 (H) 11.5 - 14.5 %    Platelets 354 150 - 450 x10*3/uL    Neutrophils % 72.9 40.0 - 80.0 %    Immature Granulocytes %, Automated 0.5 0.0 - 0.9 %    Lymphocytes % 14.4 13.0 - 44.0 %    Monocytes % 6.9 2.0 - 10.0 %    Eosinophils % 4.6 0.0 - 6.0 %    Basophils % 0.7 0.0 - 2.0 %    Neutrophils Absolute 9.20 (H) 1.20 - 7.70 x10*3/uL    Immature Granulocytes Absolute, Automated 0.06 0.00 - 0.70 x10*3/uL    Lymphocytes Absolute 1.82 1.20 - 4.80 x10*3/uL    Monocytes Absolute 0.87 0.10 - 1.00 x10*3/uL    Eosinophils Absolute 0.58 0.00 - 0.70 x10*3/uL    Basophils Absolute 0.09 0.00 - 0.10 x10*3/uL   Comprehensive metabolic panel   Result Value Ref Range    Glucose 141 (H) 74 - 99 mg/dL    Sodium 138 136 - 145 mmol/L    Potassium 4.2 3.5 - 5.3 mmol/L    Chloride 100 98 - 107 mmol/L    Bicarbonate 31 21 - 32 mmol/L    Anion Gap 11 10 - 20 mmol/L    Urea Nitrogen 20 6 - 23 mg/dL    Creatinine 1.07 0.50 - 1.30 mg/dL    eGFR 83 >60 mL/min/1.73m*2    Calcium 8.9 8.6 - 10.3 mg/dL    Albumin 4.2 3.4 - 5.0 g/dL    Alkaline Phosphatase 82 33 - 120 U/L    Total Protein 7.4 6.4 - 8.2 g/dL    AST 18 9 - 39 U/L    Bilirubin, Total 0.3 0.0 - 1.2 mg/dL    ALT 20 10 - 52 U/L   Magnesium   Result Value Ref Range    Magnesium 1.71 1.60 - 2.40 mg/dL   Urinalysis with Reflex Culture and Microscopic   Result Value Ref " Range    Color, Urine Light-Orange (N) Light-Yellow, Yellow, Dark-Yellow    Appearance, Urine Turbid (N) Clear    Specific Gravity, Urine 1.012 1.005 - 1.035    pH, Urine 7.0 5.0, 5.5, 6.0, 6.5, 7.0, 7.5, 8.0    Protein, Urine 100 (2+) (A) NEGATIVE, 10 (TRACE), 20 (TRACE) mg/dL    Glucose, Urine Normal Normal mg/dL    Blood, Urine 1.0 (3+) (A) NEGATIVE mg/dL    Ketones, Urine NEGATIVE NEGATIVE mg/dL    Bilirubin, Urine NEGATIVE NEGATIVE mg/dL    Urobilinogen, Urine Normal Normal mg/dL    Nitrite, Urine NEGATIVE NEGATIVE    Leukocyte Esterase, Urine 500 Mario/uL (A) NEGATIVE   Extra Urine Gray Tube   Result Value Ref Range    Extra Tube     Microscopic Only, Urine   Result Value Ref Range    WBC, Urine >50 (A) 1-5, NONE /HPF    WBC Clumps, Urine MANY Reference range not established. /HPF    RBC, Urine >20 (A) NONE, 1-2, 3-5 /HPF    Bacteria, Urine 1+ (A) NONE SEEN /HPF    Budding Yeast, Urine PRESENT (A) NONE /HPF   Urine Culture    Specimen: Nephrostomy Tube; Urine   Result Value Ref Range    Urine Culture       Growth indicates contamination with enteric matti. Repeat culture if clinically indicated.   CBC   Result Value Ref Range    WBC 12.6 (H) 4.4 - 11.3 x10*3/uL    nRBC 0.0 0.0 - 0.0 /100 WBCs    RBC 5.23 4.50 - 5.90 x10*6/uL    Hemoglobin 9.5 (L) 13.5 - 17.5 g/dL    Hematocrit 38.6 (L) 41.0 - 52.0 %    MCV 74 (L) 80 - 100 fL    MCH 18.2 (L) 26.0 - 34.0 pg    MCHC 24.6 (L) 32.0 - 36.0 g/dL    RDW 20.3 (H) 11.5 - 14.5 %    Platelets 331 150 - 450 x10*3/uL   POCT GLUCOSE   Result Value Ref Range    POCT Glucose 146 (H) 74 - 99 mg/dL   POCT GLUCOSE   Result Value Ref Range    POCT Glucose 153 (H) 74 - 99 mg/dL   POCT GLUCOSE   Result Value Ref Range    POCT Glucose 190 (H) 74 - 99 mg/dL   POCT GLUCOSE   Result Value Ref Range    POCT Glucose 205 (H) 74 - 99 mg/dL   CBC and Auto Differential   Result Value Ref Range    WBC 11.3 4.4 - 11.3 x10*3/uL    nRBC 0.0 0.0 - 0.0 /100 WBCs    RBC 5.14 4.50 - 5.90 x10*6/uL     Hemoglobin 9.9 (L) 13.5 - 17.5 g/dL    Hematocrit 37.2 (L) 41.0 - 52.0 %    MCV 72 (L) 80 - 100 fL    MCH 19.3 (L) 26.0 - 34.0 pg    MCHC 26.6 (L) 32.0 - 36.0 g/dL    RDW 20.4 (H) 11.5 - 14.5 %    Platelets 316 150 - 450 x10*3/uL    Neutrophils % 64.1 40.0 - 80.0 %    Immature Granulocytes %, Automated 0.4 0.0 - 0.9 %    Lymphocytes % 19.2 13.0 - 44.0 %    Monocytes % 9.6 2.0 - 10.0 %    Eosinophils % 6.2 0.0 - 6.0 %    Basophils % 0.5 0.0 - 2.0 %    Neutrophils Absolute 7.23 1.20 - 7.70 x10*3/uL    Immature Granulocytes Absolute, Automated 0.04 0.00 - 0.70 x10*3/uL    Lymphocytes Absolute 2.17 1.20 - 4.80 x10*3/uL    Monocytes Absolute 1.08 (H) 0.10 - 1.00 x10*3/uL    Eosinophils Absolute 0.70 0.00 - 0.70 x10*3/uL    Basophils Absolute 0.06 0.00 - 0.10 x10*3/uL   Comprehensive Metabolic Panel   Result Value Ref Range    Glucose 139 (H) 74 - 99 mg/dL    Sodium 138 136 - 145 mmol/L    Potassium 3.7 3.5 - 5.3 mmol/L    Chloride 99 98 - 107 mmol/L    Bicarbonate 32 21 - 32 mmol/L    Anion Gap 11 10 - 20 mmol/L    Urea Nitrogen 18 6 - 23 mg/dL    Creatinine 1.07 0.50 - 1.30 mg/dL    eGFR 83 >60 mL/min/1.73m*2    Calcium 8.6 8.6 - 10.3 mg/dL    Albumin 3.9 3.4 - 5.0 g/dL    Alkaline Phosphatase 71 33 - 120 U/L    Total Protein 7.0 6.4 - 8.2 g/dL    AST 14 9 - 39 U/L    Bilirubin, Total 0.4 0.0 - 1.2 mg/dL    ALT 17 10 - 52 U/L   POCT GLUCOSE   Result Value Ref Range    POCT Glucose 153 (H) 74 - 99 mg/dL   POCT GLUCOSE   Result Value Ref Range    POCT Glucose 148 (H) 74 - 99 mg/dL   POCT GLUCOSE   Result Value Ref Range    POCT Glucose 227 (H) 74 - 99 mg/dL   POCT GLUCOSE   Result Value Ref Range    POCT Glucose 149 (H) 74 - 99 mg/dL   CBC and Auto Differential   Result Value Ref Range    WBC 11.9 (H) 4.4 - 11.3 x10*3/uL    nRBC 0.0 0.0 - 0.0 /100 WBCs    RBC 5.14 4.50 - 5.90 x10*6/uL    Hemoglobin 9.4 (L) 13.5 - 17.5 g/dL    Hematocrit 37.6 (L) 41.0 - 52.0 %    MCV 73 (L) 80 - 100 fL    MCH 18.3 (L) 26.0 - 34.0 pg     MCHC 25.0 (L) 32.0 - 36.0 g/dL    RDW 20.1 (H) 11.5 - 14.5 %    Platelets 340 150 - 450 x10*3/uL    Neutrophils % 64.4 40.0 - 80.0 %    Immature Granulocytes %, Automated 0.5 0.0 - 0.9 %    Lymphocytes % 20.3 13.0 - 44.0 %    Monocytes % 7.8 2.0 - 10.0 %    Eosinophils % 6.2 0.0 - 6.0 %    Basophils % 0.8 0.0 - 2.0 %    Neutrophils Absolute 7.67 1.20 - 7.70 x10*3/uL    Immature Granulocytes Absolute, Automated 0.06 0.00 - 0.70 x10*3/uL    Lymphocytes Absolute 2.41 1.20 - 4.80 x10*3/uL    Monocytes Absolute 0.93 0.10 - 1.00 x10*3/uL    Eosinophils Absolute 0.74 (H) 0.00 - 0.70 x10*3/uL    Basophils Absolute 0.09 0.00 - 0.10 x10*3/uL   Comprehensive Metabolic Panel   Result Value Ref Range    Glucose 207 (H) 74 - 99 mg/dL    Sodium 136 136 - 145 mmol/L    Potassium 4.0 3.5 - 5.3 mmol/L    Chloride 98 98 - 107 mmol/L    Bicarbonate 32 21 - 32 mmol/L    Anion Gap 10 10 - 20 mmol/L    Urea Nitrogen 18 6 - 23 mg/dL    Creatinine 1.02 0.50 - 1.30 mg/dL    eGFR 88 >60 mL/min/1.73m*2    Calcium 8.2 (L) 8.6 - 10.3 mg/dL    Albumin 3.9 3.4 - 5.0 g/dL    Alkaline Phosphatase 65 33 - 120 U/L    Total Protein 6.9 6.4 - 8.2 g/dL    AST 15 9 - 39 U/L    Bilirubin, Total 0.4 0.0 - 1.2 mg/dL    ALT 17 10 - 52 U/L   POCT GLUCOSE   Result Value Ref Range    POCT Glucose 177 (H) 74 - 99 mg/dL      Imaging  CT chest abdomen pelvis wo IV contrast  Result Date: 7/1/2025  *Tracheostomy tube in proper position *Left percutaneous nephrostomy tube in proper position *Additional, nonacute imaging findings as described above. Signed by Tisha Rutherford MD      Cardiology, Vascular, and Other Imaging  No other imaging results found for the past 7 days        Assessment/Plan   Bilateral non-obstructing renal stones, bladder stone/Chronic L PCNT  - No acute surgical intervention  - As previously recommended by Dr Garcia, recommend further stone management be performed at tertiary care center given complex nature  - PCNT exhange by IR , most recently  7/3  - Would recommend L PCNT remain in position until there is a plan for definitive stone management    Thank you for this consultation, we will sign off, please call with questions    Sasha Padilla PA-C  07/04/25 8:26 AM         [1] No family history on file.  [2]   Current Facility-Administered Medications:     acetaminophen (Tylenol) tablet 650 mg, 650 mg, oral, q4h PRN, 650 mg at 07/03/25 0048 **OR** acetaminophen (Tylenol) oral liquid 650 mg, 650 mg, oral, q4h PRN **OR** acetaminophen (Tylenol) suppository 650 mg, 650 mg, rectal, q4h PRN, Charles White PA-C    albuterol 2.5 mg /3 mL (0.083 %) nebulizer solution 2.5 mg, 2.5 mg, nebulization, q4h PRN, SKYLAR Schneider, 2.5 mg at 07/04/25 0646    ammonium lactate (Lac-Hydrin) 12 % lotion 1 Application, 1 Application, Topical, BID, SKYLAR Schneider, 1 Application at 07/04/25 0017    apixaban (Eliquis) tablet 5 mg, 5 mg, oral, BID, Charles White PA-C, 5 mg at 07/03/25 2027    baclofen (Lioresal) tablet 5 mg, 5 mg, oral, 4x daily, Charles White PA-C, 5 mg at 07/04/25 0623    benzocaine 20 % mucosal gel 1 Application, 1 Application, Mouth/Throat, q6h PRN, SKYLAR Schneider    budesonide (Pulmicort) 0.5 mg/2 mL nebulizer solution 0.5 mg, 0.5 mg, nebulization, BID, SKYLAR Schneider, 0.5 mg at 07/04/25 0646    cetirizine (ZyrTEC) tablet 10 mg, 10 mg, oral, Daily, SKYLAR Schneider, 10 mg at 07/03/25 0912    cyclobenzaprine (Flexeril) tablet 10 mg, 10 mg, oral, BID, Charles White PA-C, 10 mg at 07/03/25 2026    dextromethorphan-guaifenesin (Robitussin DM)  mg/5 mL oral liquid 10 mL, 10 mL, oral, q4h PRN, SKYLAR Schneider    dextrose 50 % injection 12.5 g, 12.5 g, intravenous, q15 min PRN, Charles White PA-C    dextrose 50 % injection 25 g, 25 g, intravenous, q15 min PRN, Charles White PA-C    docusate sodium (Colace) capsule 100 mg, 100 mg, oral, Daily, SKYLAR Schneider, 100 mg  at 07/03/25 0912    folic acid (Folvite) tablet 1 mg, 1 mg, oral, Daily, SKYLAR Schneider, 1 mg at 07/03/25 0912    glucagon (Glucagen) injection 1 mg, 1 mg, intramuscular, q15 min PRN, Charles White PA-C    glucagon (Glucagen) injection 1 mg, 1 mg, intramuscular, q15 min PRN, Charles White PA-C    insulin lispro injection 0-10 Units, 0-10 Units, subcutaneous, TID AC, Charles White PA-C, 4 Units at 07/03/25 1643    lacosamide (Vimpat) tablet 100 mg, 100 mg, oral, q12h TAE, Charles White PA-C, 100 mg at 07/03/25 2027    levETIRAcetam (Keppra) tablet 750 mg, 750 mg, oral, BID, Charles White PA-C, 750 mg at 07/03/25 2026    metoprolol succinate XL (Toprol-XL) 24 hr tablet 25 mg, 25 mg, oral, Daily, SKYLAR Schneider, 25 mg at 07/03/25 0912    mupirocin (Bactroban) 2 % ointment, , Topical, BID, SKYLAR Schneider, Given at 07/03/25 0912    ondansetron (Zofran) tablet 4 mg, 4 mg, oral, q8h PRN **OR** ondansetron (Zofran) injection 4 mg, 4 mg, intravenous, q8h PRN, Charles White PA-C    oxyCODONE-acetaminophen (Percocet) 5-325 mg per tablet 1 tablet, 1 tablet, oral, BID, SKYLAR Schneider, 1 tablet at 07/03/25 2026    oxyCODONE-acetaminophen (Percocet) 5-325 mg per tablet 1 tablet, 1 tablet, oral, q8h PRN, SKYLAR Schneider    oxygen (O2) therapy, , inhalation, Continuous - Inhalation, Zackery Melendez MD, 30 L/min at 07/04/25 0650    pantoprazole (ProtoNix) EC tablet 40 mg, 40 mg, oral, Daily, SKYLAR Schneider, 40 mg at 07/04/25 0623    piperacillin-tazobactam (Zosyn) 3.375 g in dextrose (iso) IV 50 mL, 3.375 g, intravenous, q6h, Charles White PA-C, Stopped at 07/04/25 0349    psyllium (Metamucil) packet 1 packet, 1 packet, oral, Daily, Charles White PA-C, 1 packet at 07/03/25 0912    pyridoxine (Vitamin B-6) tablet 100 mg, 100 mg, oral, Daily, SKYLAR Schneider    sertraline (Zoloft) tablet 50 mg, 50 mg, oral, Daily, Tavia DAMON  ROWAN Aparicio-CNP, 50 mg at 07/03/25 0931

## 2025-07-04 NOTE — CARE PLAN
Problem: Skin  Goal: Participates in plan/prevention/treatment measures  Outcome: Progressing  Flowsheets (Taken 7/4/2025 1138)  Participates in plan/prevention/treatment measures: Elevate heels  Goal: Prevent/manage excess moisture  Outcome: Progressing  Flowsheets (Taken 7/4/2025 1138)  Prevent/manage excess moisture: Moisturize dry skin  Goal: Prevent/minimize sheer/friction injuries  Outcome: Progressing  Flowsheets (Taken 7/4/2025 1138)  Prevent/minimize sheer/friction injuries: Use pull sheet  Goal: Promote/optimize nutrition  Outcome: Progressing  Flowsheets (Taken 7/4/2025 1138)  Promote/optimize nutrition: Consume > 50% meals/supplements  Goal: Promote skin healing  Outcome: Progressing  Flowsheets (Taken 7/4/2025 1138)  Promote skin healing: Assess skin/pad under line(s)/device(s)     Problem: Pain - Adult  Goal: Verbalizes/displays adequate comfort level or baseline comfort level  Outcome: Progressing     Problem: Safety - Adult  Goal: Free from fall injury  Outcome: Progressing     Problem: Discharge Planning  Goal: Discharge to home or other facility with appropriate resources  Outcome: Progressing     Problem: Chronic Conditions and Co-morbidities  Goal: Patient's chronic conditions and co-morbidity symptoms are monitored and maintained or improved  Outcome: Progressing     Problem: Nutrition  Goal: Nutrient intake appropriate for maintaining nutritional needs  Outcome: Progressing   The patient's goals for the shift include      The clinical goals for the shift include comfort and safety    Over the shift, the patient did not make progress toward the following goals. Barriers to progression include none. Recommendations to address these barriers include n/a.

## 2025-07-04 NOTE — DISCHARGE SUMMARY
Discharge Diagnosis  Malfunction of nephrostomy tube           Issues Requiring Follow-Up  Patient fully evaluated on July 4 and renal function is stable.  Patient cleared for discharge today back to his skilled nursing facility.    Discharge Meds     Medication List      START taking these medications     amoxicillin-clavulanate 875-125 mg tablet; Commonly known as: Augmentin;   Take 1 tablet by mouth 2 times a day for 10 days.   Bacid with Lactospore 1 billion cell capsule; Generic drug:   lactobacillus acidophilus; Take 1 capsule by mouth once daily.   mupirocin 2 % ointment; Commonly known as: Bactroban; Apply topically 2   times a day.   psyllium 3.4 gram packet; Commonly known as: Metamucil; Take 1 packet by   mouth once daily.; Start taking on: July 5, 2025     CHANGE how you take these medications     oxygen gas therapy; Commonly known as: O2; Inhale 1 each every 12   hours.; What changed: how much to take, when to take this, reasons to take   this     CONTINUE taking these medications     albuterol 2.5 mg /3 mL (0.083 %) nebulizer solution   ammonium lactate 12 % cream; Commonly known as: Amlactin   baclofen 5 mg tablet; Commonly known as: Lioresal   benzocaine 20 % mucosal gel   budesonide 0.5 mg/2 mL nebulizer solution; Commonly known as: Pulmicort   cholecalciferol 125 mcg (5,000 units) tablet; Commonly known as: Vitamin   D-3   cyclobenzaprine 10 mg tablet; Commonly known as: Flexeril   dextromethorphan-guaifenesin  mg/5 mL oral liquid; Commonly known   as: Robitussin DM   docusate sodium 100 mg capsule; Commonly known as: Colace; Take 1   capsule (100 mg) by mouth once daily.   Eliquis 5 mg tablet; Generic drug: apixaban   folic acid 1 mg tablet; Commonly known as: Folvite   lacosamide 100 mg tablet; Commonly known as: Vimpat   levETIRAcetam 750 mg tablet; Commonly known as: Keppra   loratadine 10 mg tablet; Commonly known as: Claritin   metFORMIN 500 mg tablet; Commonly known as: Glucophage    metoprolol succinate XL 25 mg 24 hr tablet; Commonly known as: Toprol-XL   omeprazole 20 mg DR capsule; Commonly known as: PriLOSEC   * oxyCODONE-acetaminophen 5-325 mg tablet; Commonly known as: Percocet   * oxyCODONE-acetaminophen 5-325 mg tablet; Commonly known as: Percocet   pyridoxine 100 mg tablet; Commonly known as: Vitamin B-6; Take 1 tablet   (100 mg) by mouth once daily. Do not start before December 1, 2023.   sertraline 50 mg tablet; Commonly known as: Zoloft  * This list has 2 medication(s) that are the same as other medications   prescribed for you. Read the directions carefully, and ask your doctor or   other care provider to review them with you.     STOP taking these medications     lactobacillus acidophilus tablet tablet       Test Results Pending At Discharge  Pending Labs       No current pending labs.            Hospital Course         Zackery Melendez MD  Physician  Internal Medicine     Progress Notes      Signed     Date of Service: 7/3/2025  4:05 PM     Signed       Expand All Collapse All    Dionte Sharpe is a 54 y.o. male on day 1 of admission presenting with Malfunction of nephrostomy tube.        Subjective  7/3/25: Patient seen and fully examined at bedside, patient ill appearing, acutely complex, marked decline from baseline. Patient remains hospitalized for acute onset with complex medical and pharmacological management, malfunction of nephrostomy tube, IR consulted for replacement. Will continue antibiotics for sepsis, leukocytosis. WBC is now normal @ 11.3 from 12.6. Will continue with empiric coverage, cultures final results show enteric matti.  Hemoglobin @ 9.9 from 9.5. Continue to monitor overnight and repeat labs in the morning. Slow but progressive improvements noted. High Complexity with updates to multiple problems, adjustments to treatment plan, and need for ongoing inpatient care. Long discussion on goals of care with patient and family, will continue current and await  diagnostic findings. Patient reports: no new complaints, decline from baseline, weakness         Objective  Last Recorded Vitals  /69   Pulse 101   Temp 36.9 °C (98.4 °F)   Resp 18   Wt 99.8 kg (220 lb)   SpO2 95%   Intake/Output last 3 Shifts:     Intake/Output Summary (Last 24 hours) at 7/3/2025 1605  Last data filed at 7/3/2025 0043      Gross per 24 hour   Intake 550 ml   Output 600 ml   Net -50 ml         Admission Weight  Weight: 99.8 kg (220 lb) (07/01/25 1634)     Daily Weight  07/01/25 : 99.8 kg (220 lb)     Image Results  CT chest abdomen pelvis wo IV contrast  Narrative: STUDY:  CT Chest, Abdomen, and Pelvis without IV Contrast; 7/1/2025 at 9:01 PM  INDICATION:  Nephrostomy tube, trach.  COMPARISON:  CTA chest 2/10/2023, CT chest, abdomen and pelvis 5/22/2022.  ACCESSION NUMBER(S):  [id number]  ORDERING CLINICIAN:  GONSALO RAM  TECHNIQUE:  CT of the chest, abdomen, and pelvis was performed.  Contiguous axial  images were obtained at 3 mm slice thickness through the chest,  abdomen, and pelvis.  Coronal and sagittal reconstructions at 3 mm  slice thickness were performed.  No intravenous contrast was  administered.     FINDINGS:  Please note that the evaluation of vessels, lymph nodes and organs is  limited without intravenous contrast.   CHEST:  Thyroid: No acute pathology  Cardiac: The heart is normal in size.  No pericardial effusion.  The coronary arteries and associated branching segments demonstrate no  acute pathology.  Pulmonary / Airways: Evaluation limited due to diminished inspiratory  effort.  There is general mosaicism attenuation and bilateral lower  lobe atelectatic changes.  No focal consolidation, or pleural  effusion.  There is no pneumothorax.  A tracheostomy tube is in proper  position..  Pleura: No acute pathology  Mediastinal: No suspicious adenopathy.  Vasculature: No aneurysm of the intrathoracic aorta.  There is  conventional aortic arch anatomy with type I  configuration.  Extrathoracic: No acute pathology.  ABDOMEN/PELVIS  Hepatobiliary: No acute pathology of the liver or gallbladder.  Spleen: No acute pathology.  Pancreas: No acute pathology.  Adrenals: Generalized parenchymal atrophy bilaterally.  Kidneys/Urinary bladder: A left percutaneous nephrostomy tube noted to  be in proper position with the pigtail within the central collecting  system.  Large, nonobstructing renal calculi noted in the bilateral  renal collecting system.  No hydronephrosis.  Calcification layering in the dependent portion of the fluid-filled  urinary bladder.  Gastrointestinal tract: Prominence of stool in the rectal vault  suggesting constipation.  Additionally area of focal rounded  hyperdense fecal material within the mid transverse colon with  generalized decompression of the distal transverse into descending  colon.  This raises the possibility for a fecal bezoar.  Right lower  quadrant colostomy noted to be intact.  Vasculature: No acute pathology.  Lymph nodes: No acute pathology.  Mesentery/Peritoneum: No acute pathology.  Pelvis/Reproductive Organs: No acute pathology.  Osseous/Miscellaneous: No acute pathology.  Generalized loss of domain  of the anterior abdominal wall with a large small bowel containing  hernia noted  Impression: *Tracheostomy tube in proper position  *Left percutaneous nephrostomy tube in proper position  *Additional, nonacute imaging findings as described above.  Signed by Tisha Rutherford MD        Physical Exam     General: in no acute distress  Eyes: PERRLA   HENT: Normo-cephalic, atraumatic.   CV: Regular rate, regular rhythm.   Resp: Breathing non-labored,  diminished to auscultation bilaterally  GI: BS x4   : monitor intake and output  MSK/Extremities: No gross bony deformities. PT/OT on the case  Skin: Warm. Appropriate color, continue offloading  Neuro:  Face symmetric.   Psych: returning to baseline, improved      10 point ROS negative unless otherwise  noted in HPI     Patient recently received an antibiotic (last 12 hours)         Date/Time Action Medication Dose     07/03/25 1216 New Bag    piperacillin-tazobactam (Zosyn) 3.375 g in dextrose (iso) IV 50 mL 3.375 g     07/03/25 0912 Given    mupirocin (Bactroban) 2 % ointment       07/03/25 0534 New Bag    piperacillin-tazobactam (Zosyn) 3.375 g in dextrose (iso) IV 50 mL 3.375 g                      Patient fully evaluated 7/3/25  for    Problem List Items Addressed This Visit                  Genitourinary and Reproductive     Acute UTI     * (Principal) Malfunction of nephrostomy tube - Primary      Brought to hospital - had concerns including Flank Pain.  Patient seen resting in bed with head of bed elevated, no s/s or c/o acute difficulties at this time.  Vital signs for last 24 hours Temp:  [36.7 °C (98.1 °F)-37.6 °C (99.7 °F)] 36.9 °C (98.4 °F)  Heart Rate:  [101-115] 101  Resp:  [17-26] 18  BP: (113-157)/(69-90) 113/69  FiO2 (%):  [30 %] 30 %    No intake/output data recorded.  Patient still requiring frequent cardiac and SPO2 monitoring. Continue aggressive pulmonary hygiene and oral hygiene. Off loading as tolerated for skin integrity. Medications and labs reviewed-         Results for orders placed or performed during the hospital encounter of 07/01/25 (from the past 24 hours)   POCT GLUCOSE   Result Value Ref Range     POCT Glucose 190 (H) 74 - 99 mg/dL   POCT GLUCOSE   Result Value Ref Range     POCT Glucose 205 (H) 74 - 99 mg/dL   CBC and Auto Differential   Result Value Ref Range     WBC 11.3 4.4 - 11.3 x10*3/uL     nRBC 0.0 0.0 - 0.0 /100 WBCs     RBC 5.14 4.50 - 5.90 x10*6/uL     Hemoglobin 9.9 (L) 13.5 - 17.5 g/dL     Hematocrit 37.2 (L) 41.0 - 52.0 %     MCV 72 (L) 80 - 100 fL     MCH 19.3 (L) 26.0 - 34.0 pg     MCHC 26.6 (L) 32.0 - 36.0 g/dL     RDW 20.4 (H) 11.5 - 14.5 %     Platelets 316 150 - 450 x10*3/uL     Neutrophils % 64.1 40.0 - 80.0 %     Immature Granulocytes %, Automated 0.4 0.0 -  0.9 %     Lymphocytes % 19.2 13.0 - 44.0 %     Monocytes % 9.6 2.0 - 10.0 %     Eosinophils % 6.2 0.0 - 6.0 %     Basophils % 0.5 0.0 - 2.0 %     Neutrophils Absolute 7.23 1.20 - 7.70 x10*3/uL     Immature Granulocytes Absolute, Automated 0.04 0.00 - 0.70 x10*3/uL     Lymphocytes Absolute 2.17 1.20 - 4.80 x10*3/uL     Monocytes Absolute 1.08 (H) 0.10 - 1.00 x10*3/uL     Eosinophils Absolute 0.70 0.00 - 0.70 x10*3/uL     Basophils Absolute 0.06 0.00 - 0.10 x10*3/uL   Comprehensive Metabolic Panel   Result Value Ref Range     Glucose 139 (H) 74 - 99 mg/dL     Sodium 138 136 - 145 mmol/L     Potassium 3.7 3.5 - 5.3 mmol/L     Chloride 99 98 - 107 mmol/L     Bicarbonate 32 21 - 32 mmol/L     Anion Gap 11 10 - 20 mmol/L     Urea Nitrogen 18 6 - 23 mg/dL     Creatinine 1.07 0.50 - 1.30 mg/dL     eGFR 83 >60 mL/min/1.73m*2     Calcium 8.6 8.6 - 10.3 mg/dL     Albumin 3.9 3.4 - 5.0 g/dL     Alkaline Phosphatase 71 33 - 120 U/L     Total Protein 7.0 6.4 - 8.2 g/dL     AST 14 9 - 39 U/L     Bilirubin, Total 0.4 0.0 - 1.2 mg/dL     ALT 17 10 - 52 U/L   POCT GLUCOSE   Result Value Ref Range     POCT Glucose 153 (H) 74 - 99 mg/dL   POCT GLUCOSE   Result Value Ref Range     POCT Glucose 148 (H) 74 - 99 mg/dL      Patient recently received an antibiotic (last 12 hours)         Date/Time Action Medication Dose     07/03/25 1216 New Bag    piperacillin-tazobactam (Zosyn) 3.375 g in dextrose (iso) IV 50 mL 3.375 g     07/03/25 0912 Given    mupirocin (Bactroban) 2 % ointment       07/03/25 0534 New Bag    piperacillin-tazobactam (Zosyn) 3.375 g in dextrose (iso) IV 50 mL 3.375 g             Plan discussed with interdisciplinary team, continue current and repeat labs in the AM.         Discharge planning discussed with patient and care team. Therapy evaluations ordered.      Patient aware and agreeable to current plan, continue plan as above.      I spent a total of 60 minutes on the date of the service which included preparing to see  the patient, face-to-face patient care, completing clinical documentation, obtaining and/or reviewing separately obtained history, performing a medically appropriate examination, counseling and educating the patient/family/caregiver, ordering medications, tests, or procedures, communicating with other HCPs (not separately reported), independently interpreting results (not separately reported), communicating results to the patient/family/caregiver, and care coordination (not separately reported).   Relevant Results                 This patient currently has cardiac telemetry ordered; if you would like to modify or discontinue the telemetry order, click here to go to the orders activity to modify/discontinue the order.                    Assessment & Plan  Malfunction of nephrostomy tube                       Revision History         Pertinent Physical Exam At Time of Discharge  Physical Exam    Outpatient Follow-Up  No future appointments.      Zackery Melendez MD

## 2025-07-05 VITALS
HEART RATE: 84 BPM | BODY MASS INDEX: 30.8 KG/M2 | DIASTOLIC BLOOD PRESSURE: 90 MMHG | HEIGHT: 71 IN | WEIGHT: 220 LBS | RESPIRATION RATE: 18 BRPM | TEMPERATURE: 96.4 F | OXYGEN SATURATION: 90 % | SYSTOLIC BLOOD PRESSURE: 158 MMHG

## 2025-07-05 LAB
ALBUMIN SERPL BCP-MCNC: 4.1 G/DL (ref 3.4–5)
ALP SERPL-CCNC: 65 U/L (ref 33–120)
ALT SERPL W P-5'-P-CCNC: 17 U/L (ref 10–52)
ANION GAP SERPL CALC-SCNC: 8 MMOL/L (ref 10–20)
AST SERPL W P-5'-P-CCNC: 16 U/L (ref 9–39)
BASOPHILS # BLD AUTO: 0.06 X10*3/UL (ref 0–0.1)
BASOPHILS NFR BLD AUTO: 0.5 %
BILIRUB SERPL-MCNC: 0.3 MG/DL (ref 0–1.2)
BUN SERPL-MCNC: 15 MG/DL (ref 6–23)
CALCIUM SERPL-MCNC: 8.3 MG/DL (ref 8.6–10.3)
CHLORIDE SERPL-SCNC: 100 MMOL/L (ref 98–107)
CO2 SERPL-SCNC: 34 MMOL/L (ref 21–32)
CREAT SERPL-MCNC: 1.05 MG/DL (ref 0.5–1.3)
EGFRCR SERPLBLD CKD-EPI 2021: 85 ML/MIN/1.73M*2
EOSINOPHIL # BLD AUTO: 0.69 X10*3/UL (ref 0–0.7)
EOSINOPHIL NFR BLD AUTO: 6.1 %
ERYTHROCYTE [DISTWIDTH] IN BLOOD BY AUTOMATED COUNT: 20.5 % (ref 11.5–14.5)
GLUCOSE BLD MANUAL STRIP-MCNC: 144 MG/DL (ref 74–99)
GLUCOSE BLD MANUAL STRIP-MCNC: 203 MG/DL (ref 74–99)
GLUCOSE SERPL-MCNC: 187 MG/DL (ref 74–99)
HCT VFR BLD AUTO: 37.6 % (ref 41–52)
HGB BLD-MCNC: 9.5 G/DL (ref 13.5–17.5)
HOLD SPECIMEN: NORMAL
IMM GRANULOCYTES # BLD AUTO: 0.07 X10*3/UL (ref 0–0.7)
IMM GRANULOCYTES NFR BLD AUTO: 0.6 % (ref 0–0.9)
LYMPHOCYTES # BLD AUTO: 2.36 X10*3/UL (ref 1.2–4.8)
LYMPHOCYTES NFR BLD AUTO: 21 %
MCH RBC QN AUTO: 18.4 PG (ref 26–34)
MCHC RBC AUTO-ENTMCNC: 25.3 G/DL (ref 32–36)
MCV RBC AUTO: 73 FL (ref 80–100)
MONOCYTES # BLD AUTO: 0.88 X10*3/UL (ref 0.1–1)
MONOCYTES NFR BLD AUTO: 7.8 %
NEUTROPHILS # BLD AUTO: 7.2 X10*3/UL (ref 1.2–7.7)
NEUTROPHILS NFR BLD AUTO: 64 %
NRBC BLD-RTO: 0 /100 WBCS (ref 0–0)
PLATELET # BLD AUTO: 334 X10*3/UL (ref 150–450)
POTASSIUM SERPL-SCNC: 3.6 MMOL/L (ref 3.5–5.3)
PROT SERPL-MCNC: 7.3 G/DL (ref 6.4–8.2)
RBC # BLD AUTO: 5.15 X10*6/UL (ref 4.5–5.9)
SODIUM SERPL-SCNC: 138 MMOL/L (ref 136–145)
WBC # BLD AUTO: 11.3 X10*3/UL (ref 4.4–11.3)

## 2025-07-05 PROCEDURE — 2500000005 HC RX 250 GENERAL PHARMACY W/O HCPCS: Performed by: INTERNAL MEDICINE

## 2025-07-05 PROCEDURE — 94640 AIRWAY INHALATION TREATMENT: CPT

## 2025-07-05 PROCEDURE — 80053 COMPREHEN METABOLIC PANEL: CPT | Performed by: INTERNAL MEDICINE

## 2025-07-05 PROCEDURE — 36415 COLL VENOUS BLD VENIPUNCTURE: CPT | Performed by: INTERNAL MEDICINE

## 2025-07-05 PROCEDURE — 2500000002 HC RX 250 W HCPCS SELF ADMINISTERED DRUGS (ALT 637 FOR MEDICARE OP, ALT 636 FOR OP/ED)

## 2025-07-05 PROCEDURE — 2500000002 HC RX 250 W HCPCS SELF ADMINISTERED DRUGS (ALT 637 FOR MEDICARE OP, ALT 636 FOR OP/ED): Performed by: NURSE PRACTITIONER

## 2025-07-05 PROCEDURE — 2500000001 HC RX 250 WO HCPCS SELF ADMINISTERED DRUGS (ALT 637 FOR MEDICARE OP): Performed by: NURSE PRACTITIONER

## 2025-07-05 PROCEDURE — 82947 ASSAY GLUCOSE BLOOD QUANT: CPT

## 2025-07-05 PROCEDURE — 2500000004 HC RX 250 GENERAL PHARMACY W/ HCPCS (ALT 636 FOR OP/ED): Mod: JZ

## 2025-07-05 PROCEDURE — 2500000005 HC RX 250 GENERAL PHARMACY W/O HCPCS: Performed by: NURSE PRACTITIONER

## 2025-07-05 PROCEDURE — 85025 COMPLETE CBC W/AUTO DIFF WBC: CPT | Performed by: INTERNAL MEDICINE

## 2025-07-05 PROCEDURE — 2500000001 HC RX 250 WO HCPCS SELF ADMINISTERED DRUGS (ALT 637 FOR MEDICARE OP)

## 2025-07-05 RX ADMIN — BACLOFEN 5 MG: 10 TABLET ORAL at 05:27

## 2025-07-05 RX ADMIN — PIPERACILLIN SODIUM AND TAZOBACTAM SODIUM 3.38 G: 3; .375 INJECTION, SOLUTION INTRAVENOUS at 02:47

## 2025-07-05 RX ADMIN — Medication 1 APPLICATION: at 08:48

## 2025-07-05 RX ADMIN — MUPIROCIN: 20 OINTMENT TOPICAL at 08:47

## 2025-07-05 RX ADMIN — OXYCODONE HYDROCHLORIDE AND ACETAMINOPHEN 1 TABLET: 5; 325 TABLET ORAL at 08:46

## 2025-07-05 RX ADMIN — CYCLOBENZAPRINE 10 MG: 10 TABLET, FILM COATED ORAL at 08:46

## 2025-07-05 RX ADMIN — PSYLLIUM HUSK 1 PACKET: 3.4 POWDER ORAL at 08:47

## 2025-07-05 RX ADMIN — LACOSAMIDE 100 MG: 100 TABLET, FILM COATED ORAL at 08:46

## 2025-07-05 RX ADMIN — PANTOPRAZOLE SODIUM 40 MG: 40 TABLET, DELAYED RELEASE ORAL at 05:27

## 2025-07-05 RX ADMIN — SERTRALINE HYDROCHLORIDE 50 MG: 50 TABLET ORAL at 08:47

## 2025-07-05 RX ADMIN — FOLIC ACID 1 MG: 1 TABLET ORAL at 08:46

## 2025-07-05 RX ADMIN — METOPROLOL SUCCINATE 25 MG: 25 TABLET, EXTENDED RELEASE ORAL at 08:47

## 2025-07-05 RX ADMIN — APIXABAN 5 MG: 5 TABLET, FILM COATED ORAL at 08:47

## 2025-07-05 RX ADMIN — PIPERACILLIN SODIUM AND TAZOBACTAM SODIUM 3.38 G: 3; .375 INJECTION, SOLUTION INTRAVENOUS at 08:49

## 2025-07-05 RX ADMIN — INSULIN LISPRO 4 UNITS: 100 INJECTION, SOLUTION INTRAVENOUS; SUBCUTANEOUS at 08:48

## 2025-07-05 RX ADMIN — Medication 100 MG: at 08:49

## 2025-07-05 RX ADMIN — BUDESONIDE 0.5 MG: 0.5 INHALANT RESPIRATORY (INHALATION) at 06:50

## 2025-07-05 RX ADMIN — Medication 30 PERCENT: at 06:53

## 2025-07-05 RX ADMIN — CETIRIZINE HYDROCHLORIDE 10 MG: 10 TABLET, FILM COATED ORAL at 08:47

## 2025-07-05 RX ADMIN — LEVETIRACETAM 750 MG: 500 TABLET, FILM COATED ORAL at 08:46

## 2025-07-05 RX ADMIN — DOCUSATE SODIUM 100 MG: 100 CAPSULE, LIQUID FILLED ORAL at 08:46

## 2025-07-05 ASSESSMENT — PAIN SCALES - GENERAL: PAINLEVEL_OUTOF10: 0 - NO PAIN

## 2025-07-05 ASSESSMENT — PAIN - FUNCTIONAL ASSESSMENT: PAIN_FUNCTIONAL_ASSESSMENT: 0-10

## 2025-07-05 NOTE — PROGRESS NOTES
07/05/25 0819   Discharge Planning   Home or Post Acute Services Post acute facilities (Rehab/SNF/etc)   Type of Post Acute Facility Services Long term care   Expected Discharge Disposition Inter   Does the patient need discharge transport arranged? Yes   [facility] coordination needed? Yes   Has discharge transport been arranged? No     Patient has discharge order to SNF: [facility].  Will work with [facility] to arrange transport for patient.  Care transitions to follow.    0918am:  Transport arranged for 1030am.  Bedside nurse updated with time and number for report.  Nursing to update patient.  TCC placed call to patient's sister Domonique and left voicemail with call back number.  Care transitions to follow.

## 2025-07-05 NOTE — NURSING NOTE
"Attempted to call report. Transferred and phone rang for several minutes. Someone picked up and asked me to hold set the phone down. Another nurse picked up and asked how she could help. I told her I was calling report, she put the phone down and I could here her tell another nurse who said oh he's coming back today \"she can wait\" I waited for several more minutes as I overheard a conversation about \"going out, having fun, ect.\" They never picked back up so I hung up.  "

## 2025-07-05 NOTE — CARE PLAN
Problem: Skin  Goal: Participates in plan/prevention/treatment measures  Outcome: Progressing  Goal: Prevent/manage excess moisture  Outcome: Progressing  Goal: Prevent/minimize sheer/friction injuries  Outcome: Progressing  Goal: Promote/optimize nutrition  Outcome: Progressing  Flowsheets (Taken 7/4/2025 2217)  Promote/optimize nutrition: Assist with feeding  Goal: Promote skin healing  Outcome: Progressing     Problem: Pain - Adult  Goal: Verbalizes/displays adequate comfort level or baseline comfort level  Outcome: Progressing     Problem: Safety - Adult  Goal: Free from fall injury  Outcome: Progressing     Problem: Discharge Planning  Goal: Discharge to home or other facility with appropriate resources  Outcome: Progressing     Problem: Chronic Conditions and Co-morbidities  Goal: Patient's chronic conditions and co-morbidity symptoms are monitored and maintained or improved  Outcome: Progressing     Problem: Nutrition  Goal: Nutrient intake appropriate for maintaining nutritional needs  Outcome: Progressing

## 2025-07-05 NOTE — CARE PLAN
The patient's goals for the shift include  going home    The clinical goals for the shift include safety and rest    Pt awake in bed. No acute distress or complaints of pain noted. Will continue to monitor

## 2025-07-05 NOTE — DISCHARGE SUMMARY
Discharge Diagnosis  Malfunction of nephrostomy tube       Issues Requiring Follow-Up  Patient fully evaluated on July 5 and renal function is stable.  Patient cleared for discharge today back to his skilled nursing facility. Brought to hospital - had concerns including Flank Pain. Awake, more animated, with no acute events overnight, no new complaints. Slow but progressive improvements noted. Patient medically stable at time of exam, cleared for discharge today.  Goals of care emphasized with patient and family, will continue current plan of care.     Discharge Meds     Medication List      START taking these medications     amoxicillin-clavulanate 875-125 mg tablet; Commonly known as: Augmentin;   Take 1 tablet by mouth 2 times a day for 10 days.   Bacid with Lactospore 1 billion cell capsule; Generic drug:   lactobacillus acidophilus; Take 1 capsule by mouth once daily.   mupirocin 2 % ointment; Commonly known as: Bactroban; Apply topically 2   times a day.   psyllium 3.4 gram packet; Commonly known as: Metamucil; Take 1 packet by   mouth once daily.     CHANGE how you take these medications     oxygen gas therapy; Commonly known as: O2; Inhale 1 each every 12   hours.; What changed: how much to take, when to take this, reasons to take   this     CONTINUE taking these medications     albuterol 2.5 mg /3 mL (0.083 %) nebulizer solution   ammonium lactate 12 % cream; Commonly known as: Amlactin   baclofen 5 mg tablet; Commonly known as: Lioresal   benzocaine 20 % mucosal gel   budesonide 0.5 mg/2 mL nebulizer solution; Commonly known as: Pulmicort   cholecalciferol 125 mcg (5,000 units) tablet; Commonly known as: Vitamin   D-3   cyclobenzaprine 10 mg tablet; Commonly known as: Flexeril   dextromethorphan-guaifenesin  mg/5 mL oral liquid; Commonly known   as: Robitussin DM   docusate sodium 100 mg capsule; Commonly known as: Colace; Take 1   capsule (100 mg) by mouth once daily.   Eliquis 5 mg tablet; Generic  drug: apixaban   folic acid 1 mg tablet; Commonly known as: Folvite   lacosamide 100 mg tablet; Commonly known as: Vimpat   levETIRAcetam 750 mg tablet; Commonly known as: Keppra   loratadine 10 mg tablet; Commonly known as: Claritin   metFORMIN 500 mg tablet; Commonly known as: Glucophage   metoprolol succinate XL 25 mg 24 hr tablet; Commonly known as: Toprol-XL   omeprazole 20 mg DR capsule; Commonly known as: PriLOSEC   * oxyCODONE-acetaminophen 5-325 mg tablet; Commonly known as: Percocet   * oxyCODONE-acetaminophen 5-325 mg tablet; Commonly known as: Percocet   pyridoxine 100 mg tablet; Commonly known as: Vitamin B-6; Take 1 tablet   (100 mg) by mouth once daily. Do not start before December 1, 2023.   sertraline 50 mg tablet; Commonly known as: Zoloft  * This list has 2 medication(s) that are the same as other medications   prescribed for you. Read the directions carefully, and ask your doctor or   other care provider to review them with you.     STOP taking these medications     lactobacillus acidophilus tablet tablet       Test Results Pending At Discharge  Pending Labs       Order Current Status    Extra Tubes Preliminary result    SST TOP Preliminary result            Hospital Course         Zackery Melendez MD  Physician  Internal Medicine     Progress Notes      Signed     Date of Service: 7/3/2025  4:05 PM     Signed       Expand All Collapse All    Dionte Sharpe is a 54 y.o. male on day 1 of admission presenting with Malfunction of nephrostomy tube.        Subjective  7/3/25: Patient seen and fully examined at bedside, patient ill appearing, acutely complex, marked decline from baseline. Patient remains hospitalized for acute onset with complex medical and pharmacological management, malfunction of nephrostomy tube, IR consulted for replacement. Will continue antibiotics for sepsis, leukocytosis. WBC is now normal @ 11.3 from 12.6. Will continue with empiric coverage, cultures final results show enteric  matti.  Hemoglobin @ 9.9 from 9.5. Continue to monitor overnight and repeat labs in the morning. Slow but progressive improvements noted. High Complexity with updates to multiple problems, adjustments to treatment plan, and need for ongoing inpatient care. Long discussion on goals of care with patient and family, will continue current and await diagnostic findings. Patient reports: no new complaints, decline from baseline, weakness         Objective  Last Recorded Vitals  /69   Pulse 101   Temp 36.9 °C (98.4 °F)   Resp 18   Wt 99.8 kg (220 lb)   SpO2 95%   Intake/Output last 3 Shifts:     Intake/Output Summary (Last 24 hours) at 7/3/2025 1605  Last data filed at 7/3/2025 0043      Gross per 24 hour   Intake 550 ml   Output 600 ml   Net -50 ml         Admission Weight  Weight: 99.8 kg (220 lb) (07/01/25 1634)     Daily Weight  07/01/25 : 99.8 kg (220 lb)     Image Results  CT chest abdomen pelvis wo IV contrast  Narrative: STUDY:  CT Chest, Abdomen, and Pelvis without IV Contrast; 7/1/2025 at 9:01 PM  INDICATION:  Nephrostomy tube, trach.  COMPARISON:  CTA chest 2/10/2023, CT chest, abdomen and pelvis 5/22/2022.  ACCESSION NUMBER(S):  [id number]  ORDERING CLINICIAN:  GONSALO RAM  TECHNIQUE:  CT of the chest, abdomen, and pelvis was performed.  Contiguous axial  images were obtained at 3 mm slice thickness through the chest,  abdomen, and pelvis.  Coronal and sagittal reconstructions at 3 mm  slice thickness were performed.  No intravenous contrast was  administered.     FINDINGS:  Please note that the evaluation of vessels, lymph nodes and organs is  limited without intravenous contrast.   CHEST:  Thyroid: No acute pathology  Cardiac: The heart is normal in size.  No pericardial effusion.  The coronary arteries and associated branching segments demonstrate no  acute pathology.  Pulmonary / Airways: Evaluation limited due to diminished inspiratory  effort.  There is general mosaicism attenuation and  bilateral lower  lobe atelectatic changes.  No focal consolidation, or pleural  effusion.  There is no pneumothorax.  A tracheostomy tube is in proper  position..  Pleura: No acute pathology  Mediastinal: No suspicious adenopathy.  Vasculature: No aneurysm of the intrathoracic aorta.  There is  conventional aortic arch anatomy with type I configuration.  Extrathoracic: No acute pathology.  ABDOMEN/PELVIS  Hepatobiliary: No acute pathology of the liver or gallbladder.  Spleen: No acute pathology.  Pancreas: No acute pathology.  Adrenals: Generalized parenchymal atrophy bilaterally.  Kidneys/Urinary bladder: A left percutaneous nephrostomy tube noted to  be in proper position with the pigtail within the central collecting  system.  Large, nonobstructing renal calculi noted in the bilateral  renal collecting system.  No hydronephrosis.  Calcification layering in the dependent portion of the fluid-filled  urinary bladder.  Gastrointestinal tract: Prominence of stool in the rectal vault  suggesting constipation.  Additionally area of focal rounded  hyperdense fecal material within the mid transverse colon with  generalized decompression of the distal transverse into descending  colon.  This raises the possibility for a fecal bezoar.  Right lower  quadrant colostomy noted to be intact.  Vasculature: No acute pathology.  Lymph nodes: No acute pathology.  Mesentery/Peritoneum: No acute pathology.  Pelvis/Reproductive Organs: No acute pathology.  Osseous/Miscellaneous: No acute pathology.  Generalized loss of domain  of the anterior abdominal wall with a large small bowel containing  hernia noted  Impression: *Tracheostomy tube in proper position  *Left percutaneous nephrostomy tube in proper position  *Additional, nonacute imaging findings as described above.  Signed by Tisha Rutherford MD        Physical Exam     General: in no acute distress  Eyes: PERRLA   HENT: Normo-cephalic, atraumatic.   CV: Regular rate, regular rhythm.    Resp: Breathing non-labored,  diminished to auscultation bilaterally  GI: BS x4   : monitor intake and output  MSK/Extremities: No gross bony deformities. PT/OT on the case  Skin: Warm. Appropriate color, continue offloading  Neuro:  Face symmetric.   Psych: returning to baseline, improved      10 point ROS negative unless otherwise noted in HPI     Patient recently received an antibiotic (last 12 hours)         Date/Time Action Medication Dose     07/03/25 1216 New Bag    piperacillin-tazobactam (Zosyn) 3.375 g in dextrose (iso) IV 50 mL 3.375 g     07/03/25 0912 Given    mupirocin (Bactroban) 2 % ointment       07/03/25 0534 New Bag    piperacillin-tazobactam (Zosyn) 3.375 g in dextrose (iso) IV 50 mL 3.375 g                      Patient fully evaluated 7/3/25  for    Problem List Items Addressed This Visit                  Genitourinary and Reproductive     Acute UTI     * (Principal) Malfunction of nephrostomy tube - Primary      Brought to hospital - had concerns including Flank Pain.  Patient seen resting in bed with head of bed elevated, no s/s or c/o acute difficulties at this time.  Vital signs for last 24 hours Temp:  [36.7 °C (98.1 °F)-37.6 °C (99.7 °F)] 36.9 °C (98.4 °F)  Heart Rate:  [101-115] 101  Resp:  [17-26] 18  BP: (113-157)/(69-90) 113/69  FiO2 (%):  [30 %] 30 %    No intake/output data recorded.  Patient still requiring frequent cardiac and SPO2 monitoring. Continue aggressive pulmonary hygiene and oral hygiene. Off loading as tolerated for skin integrity. Medications and labs reviewed-         Results for orders placed or performed during the hospital encounter of 07/01/25 (from the past 24 hours)   POCT GLUCOSE   Result Value Ref Range     POCT Glucose 190 (H) 74 - 99 mg/dL   POCT GLUCOSE   Result Value Ref Range     POCT Glucose 205 (H) 74 - 99 mg/dL   CBC and Auto Differential   Result Value Ref Range     WBC 11.3 4.4 - 11.3 x10*3/uL     nRBC 0.0 0.0 - 0.0 /100 WBCs     RBC 5.14 4.50 -  5.90 x10*6/uL     Hemoglobin 9.9 (L) 13.5 - 17.5 g/dL     Hematocrit 37.2 (L) 41.0 - 52.0 %     MCV 72 (L) 80 - 100 fL     MCH 19.3 (L) 26.0 - 34.0 pg     MCHC 26.6 (L) 32.0 - 36.0 g/dL     RDW 20.4 (H) 11.5 - 14.5 %     Platelets 316 150 - 450 x10*3/uL     Neutrophils % 64.1 40.0 - 80.0 %     Immature Granulocytes %, Automated 0.4 0.0 - 0.9 %     Lymphocytes % 19.2 13.0 - 44.0 %     Monocytes % 9.6 2.0 - 10.0 %     Eosinophils % 6.2 0.0 - 6.0 %     Basophils % 0.5 0.0 - 2.0 %     Neutrophils Absolute 7.23 1.20 - 7.70 x10*3/uL     Immature Granulocytes Absolute, Automated 0.04 0.00 - 0.70 x10*3/uL     Lymphocytes Absolute 2.17 1.20 - 4.80 x10*3/uL     Monocytes Absolute 1.08 (H) 0.10 - 1.00 x10*3/uL     Eosinophils Absolute 0.70 0.00 - 0.70 x10*3/uL     Basophils Absolute 0.06 0.00 - 0.10 x10*3/uL   Comprehensive Metabolic Panel   Result Value Ref Range     Glucose 139 (H) 74 - 99 mg/dL     Sodium 138 136 - 145 mmol/L     Potassium 3.7 3.5 - 5.3 mmol/L     Chloride 99 98 - 107 mmol/L     Bicarbonate 32 21 - 32 mmol/L     Anion Gap 11 10 - 20 mmol/L     Urea Nitrogen 18 6 - 23 mg/dL     Creatinine 1.07 0.50 - 1.30 mg/dL     eGFR 83 >60 mL/min/1.73m*2     Calcium 8.6 8.6 - 10.3 mg/dL     Albumin 3.9 3.4 - 5.0 g/dL     Alkaline Phosphatase 71 33 - 120 U/L     Total Protein 7.0 6.4 - 8.2 g/dL     AST 14 9 - 39 U/L     Bilirubin, Total 0.4 0.0 - 1.2 mg/dL     ALT 17 10 - 52 U/L   POCT GLUCOSE   Result Value Ref Range     POCT Glucose 153 (H) 74 - 99 mg/dL   POCT GLUCOSE   Result Value Ref Range     POCT Glucose 148 (H) 74 - 99 mg/dL      Patient recently received an antibiotic (last 12 hours)         Date/Time Action Medication Dose     07/03/25 1216 New Bag    piperacillin-tazobactam (Zosyn) 3.375 g in dextrose (iso) IV 50 mL 3.375 g     07/03/25 0912 Given    mupirocin (Bactroban) 2 % ointment       07/03/25 0534 New Bag    piperacillin-tazobactam (Zosyn) 3.375 g in dextrose (iso) IV 50 mL 3.375 g             Plan  discussed with interdisciplinary team, continue current and repeat labs in the AM.         Discharge planning discussed with patient and care team. Therapy evaluations ordered.      Patient aware and agreeable to current plan, continue plan as above.      I spent a total of 60 minutes on the date of the service which included preparing to see the patient, face-to-face patient care, completing clinical documentation, obtaining and/or reviewing separately obtained history, performing a medically appropriate examination, counseling and educating the patient/family/caregiver, ordering medications, tests, or procedures, communicating with other HCPs (not separately reported), independently interpreting results (not separately reported), communicating results to the patient/family/caregiver, and care coordination (not separately reported).   Relevant Results                 This patient currently has cardiac telemetry ordered; if you would like to modify or discontinue the telemetry order, click here to go to the orders activity to modify/discontinue the order.                    Assessment & Plan  Malfunction of nephrostomy tube                       Revision History       Patient fully evaluated 07/05  for   Assessment & Plan  Malfunction of nephrostomy tube      Patient with significant clinical improvement noted, patient medically cleared for discharge today. Patient seen resting in bed with head of bed elevated, no s/s or c/o acute difficulties at this time. Vital signs for last 24 hours:  Temp:  [35.8 °C (96.4 °F)-37.6 °C (99.7 °F)] 35.8 °C (96.4 °F)  Heart Rate:  [] 84  Resp:  [18] 18  BP: (142-168)/(77-90) 158/90  FiO2 (%):  [30 %-100 %] 30 % Medications and labs reviewed-   Results for orders placed or performed during the hospital encounter of 07/01/25 (from the past 24 hours)   POCT GLUCOSE   Result Value Ref Range    POCT Glucose 147 (H) 74 - 99 mg/dL   POCT GLUCOSE   Result Value Ref Range    POCT  Glucose 232 (H) 74 - 99 mg/dL   POCT GLUCOSE   Result Value Ref Range    POCT Glucose 144 (H) 74 - 99 mg/dL   CBC and Auto Differential   Result Value Ref Range    WBC 11.3 4.4 - 11.3 x10*3/uL    nRBC 0.0 0.0 - 0.0 /100 WBCs    RBC 5.15 4.50 - 5.90 x10*6/uL    Hemoglobin 9.5 (L) 13.5 - 17.5 g/dL    Hematocrit 37.6 (L) 41.0 - 52.0 %    MCV 73 (L) 80 - 100 fL    MCH 18.4 (L) 26.0 - 34.0 pg    MCHC 25.3 (L) 32.0 - 36.0 g/dL    RDW 20.5 (H) 11.5 - 14.5 %    Platelets 334 150 - 450 x10*3/uL    Neutrophils % 64.0 40.0 - 80.0 %    Immature Granulocytes %, Automated 0.6 0.0 - 0.9 %    Lymphocytes % 21.0 13.0 - 44.0 %    Monocytes % 7.8 2.0 - 10.0 %    Eosinophils % 6.1 0.0 - 6.0 %    Basophils % 0.5 0.0 - 2.0 %    Neutrophils Absolute 7.20 1.20 - 7.70 x10*3/uL    Immature Granulocytes Absolute, Automated 0.07 0.00 - 0.70 x10*3/uL    Lymphocytes Absolute 2.36 1.20 - 4.80 x10*3/uL    Monocytes Absolute 0.88 0.10 - 1.00 x10*3/uL    Eosinophils Absolute 0.69 0.00 - 0.70 x10*3/uL    Basophils Absolute 0.06 0.00 - 0.10 x10*3/uL   Comprehensive Metabolic Panel   Result Value Ref Range    Glucose 187 (H) 74 - 99 mg/dL    Sodium 138 136 - 145 mmol/L    Potassium 3.6 3.5 - 5.3 mmol/L    Chloride 100 98 - 107 mmol/L    Bicarbonate 34 (H) 21 - 32 mmol/L    Anion Gap 8 (L) 10 - 20 mmol/L    Urea Nitrogen 15 6 - 23 mg/dL    Creatinine 1.05 0.50 - 1.30 mg/dL    eGFR 85 >60 mL/min/1.73m*2    Calcium 8.3 (L) 8.6 - 10.3 mg/dL    Albumin 4.1 3.4 - 5.0 g/dL    Alkaline Phosphatase 65 33 - 120 U/L    Total Protein 7.3 6.4 - 8.2 g/dL    AST 16 9 - 39 U/L    Bilirubin, Total 0.3 0.0 - 1.2 mg/dL    ALT 17 10 - 52 U/L   SST TOP   Result Value Ref Range    Extra Tube Hold for add-ons.    POCT GLUCOSE   Result Value Ref Range    POCT Glucose 203 (H) 74 - 99 mg/dL      Patient recently received an antibiotic (last 12 hours)       Date/Time Action Medication Dose    07/05/25 0849 New Bag    piperacillin-tazobactam (Zosyn) 3.375 g in dextrose (iso) IV  50 mL 3.375 g    07/05/25 0847 Given    mupirocin (Bactroban) 2 % ointment            No results found for the last 90 days.       Continue aggressive pulmonary hygiene and oral hygiene. Off loading as tolerated for skin integrity. No new complaints per patient, stable at time of exam.  Plan discussed with interdisciplinary team, no acute events overnight, patient denies chest pain, worsening SOB at this time. Ok to discharge, will continue current and repeat labs in the AM if patient still hospitalized. Patient aware and agreeable to current plan, continue plan as above.     I spent 60 minutes on the date of the service which included preparing to see the patient, face-to-face patient care, completing clinical documentation, obtaining and/or reviewing separately obtained history, performing a medically appropriate examination, counseling and educating the patient/family/caregiver, ordering medications, tests, or procedures, communicating with other HCPs (not separately reported), independently interpreting results (not separately reported), communicating results to the patient/family/caregiver, and care coordination (not separately reported  Pertinent Physical Exam At Time of Discharge  Physical Exam  no acute events overnight, patient denies chest pain, worsening SOB at this time.  Outpatient Follow-Up  No future appointments.      Ksenia Mcgee

## 2025-07-05 NOTE — NURSING NOTE
Called report to nurse who answered phone. Physicians here to . No acute distress noted. Taken by stretcher/ambulance back to [facility].

## 2025-07-05 NOTE — NURSING NOTE
Attempted to call report again to carlton at cell phone number [phone] left message to return call

## 2025-07-07 LAB — HOLD SPECIMEN: NORMAL

## 2025-07-08 ENCOUNTER — LAB REQUISITION (OUTPATIENT)
Dept: LAB | Facility: HOSPITAL | Age: 55
End: 2025-07-08
Payer: COMMERCIAL

## 2025-07-08 DIAGNOSIS — D64.9 ANEMIA, UNSPECIFIED: ICD-10-CM

## 2025-07-08 LAB
ANION GAP SERPL CALC-SCNC: 9 MMOL/L (ref 10–20)
BUN SERPL-MCNC: 17 MG/DL (ref 6–23)
CALCIUM SERPL-MCNC: 8.6 MG/DL (ref 8.6–10.3)
CHLORIDE SERPL-SCNC: 101 MMOL/L (ref 98–107)
CO2 SERPL-SCNC: 33 MMOL/L (ref 21–32)
CREAT SERPL-MCNC: 0.86 MG/DL (ref 0.5–1.3)
EGFRCR SERPLBLD CKD-EPI 2021: >90 ML/MIN/1.73M*2
ERYTHROCYTE [DISTWIDTH] IN BLOOD BY AUTOMATED COUNT: 20.5 % (ref 11.5–14.5)
GLUCOSE SERPL-MCNC: 147 MG/DL (ref 74–99)
HCT VFR BLD AUTO: 38.9 % (ref 41–52)
HGB BLD-MCNC: 9.7 G/DL (ref 13.5–17.5)
MCH RBC QN AUTO: 18.5 PG (ref 26–34)
MCHC RBC AUTO-ENTMCNC: 24.9 G/DL (ref 32–36)
MCV RBC AUTO: 74 FL (ref 80–100)
NRBC BLD-RTO: 0.2 /100 WBCS (ref 0–0)
PLATELET # BLD AUTO: 386 X10*3/UL (ref 150–450)
POTASSIUM SERPL-SCNC: 4.4 MMOL/L (ref 3.5–5.3)
RBC # BLD AUTO: 5.24 X10*6/UL (ref 4.5–5.9)
SODIUM SERPL-SCNC: 139 MMOL/L (ref 136–145)
WBC # BLD AUTO: 11 X10*3/UL (ref 4.4–11.3)

## 2025-07-08 PROCEDURE — 36415 COLL VENOUS BLD VENIPUNCTURE: CPT | Mod: OUT | Performed by: INTERNAL MEDICINE

## 2025-07-08 PROCEDURE — 85027 COMPLETE CBC AUTOMATED: CPT | Mod: OUT | Performed by: INTERNAL MEDICINE

## 2025-07-08 PROCEDURE — 80048 BASIC METABOLIC PNL TOTAL CA: CPT | Mod: OUT | Performed by: INTERNAL MEDICINE

## 2025-07-09 ENCOUNTER — LAB REQUISITION (OUTPATIENT)
Dept: LAB | Facility: HOSPITAL | Age: 55
End: 2025-07-09
Payer: COMMERCIAL

## 2025-07-09 DIAGNOSIS — J96.20 ACUTE AND CHRONIC RESPIRATORY FAILURE, UNSPECIFIED WHETHER WITH HYPOXIA OR HYPERCAPNIA: ICD-10-CM

## 2025-07-09 LAB
ANION GAP SERPL CALC-SCNC: 12 MMOL/L (ref 10–20)
BUN SERPL-MCNC: 17 MG/DL (ref 6–23)
CALCIUM SERPL-MCNC: 9 MG/DL (ref 8.6–10.3)
CHLORIDE SERPL-SCNC: 103 MMOL/L (ref 98–107)
CO2 SERPL-SCNC: 32 MMOL/L (ref 21–32)
CREAT SERPL-MCNC: 0.84 MG/DL (ref 0.5–1.3)
EGFRCR SERPLBLD CKD-EPI 2021: >90 ML/MIN/1.73M*2
ERYTHROCYTE [DISTWIDTH] IN BLOOD BY AUTOMATED COUNT: 20.5 % (ref 11.5–14.5)
GLUCOSE SERPL-MCNC: 140 MG/DL (ref 74–99)
HCT VFR BLD AUTO: 40 % (ref 41–52)
HGB BLD-MCNC: 10 G/DL (ref 13.5–17.5)
MCH RBC QN AUTO: 18.6 PG (ref 26–34)
MCHC RBC AUTO-ENTMCNC: 25 G/DL (ref 32–36)
MCV RBC AUTO: 74 FL (ref 80–100)
NRBC BLD-RTO: 0 /100 WBCS (ref 0–0)
PLATELET # BLD AUTO: 399 X10*3/UL (ref 150–450)
POTASSIUM SERPL-SCNC: 4.6 MMOL/L (ref 3.5–5.3)
RBC # BLD AUTO: 5.39 X10*6/UL (ref 4.5–5.9)
SODIUM SERPL-SCNC: 142 MMOL/L (ref 136–145)
WBC # BLD AUTO: 11.3 X10*3/UL (ref 4.4–11.3)

## 2025-07-09 PROCEDURE — 80048 BASIC METABOLIC PNL TOTAL CA: CPT | Mod: OUT | Performed by: INTERNAL MEDICINE

## 2025-07-09 PROCEDURE — 85027 COMPLETE CBC AUTOMATED: CPT | Mod: OUT | Performed by: INTERNAL MEDICINE

## 2025-07-09 PROCEDURE — 36415 COLL VENOUS BLD VENIPUNCTURE: CPT | Mod: OUT | Performed by: INTERNAL MEDICINE
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
## 2025-07-14 ENCOUNTER — LAB REQUISITION (OUTPATIENT)
Dept: LAB | Facility: HOSPITAL | Age: 55
End: 2025-07-14
Payer: COMMERCIAL

## 2025-07-14 DIAGNOSIS — I48.91 UNSPECIFIED ATRIAL FIBRILLATION (MULTI): ICD-10-CM

## 2025-07-14 DIAGNOSIS — E11.9 TYPE 2 DIABETES MELLITUS WITHOUT COMPLICATIONS: ICD-10-CM

## 2025-07-14 DIAGNOSIS — J96.10 CHRONIC RESPIRATORY FAILURE, UNSPECIFIED WHETHER WITH HYPOXIA OR HYPERCAPNIA: ICD-10-CM

## 2025-07-14 LAB
ANION GAP SERPL CALC-SCNC: 10 MMOL/L (ref 10–20)
BUN SERPL-MCNC: 21 MG/DL (ref 6–23)
CALCIUM SERPL-MCNC: 8.5 MG/DL (ref 8.6–10.3)
CHLORIDE SERPL-SCNC: 101 MMOL/L (ref 98–107)
CO2 SERPL-SCNC: 33 MMOL/L (ref 21–32)
CREAT SERPL-MCNC: 0.94 MG/DL (ref 0.5–1.3)
EGFRCR SERPLBLD CKD-EPI 2021: >90 ML/MIN/1.73M*2
ERYTHROCYTE [DISTWIDTH] IN BLOOD BY AUTOMATED COUNT: 19.9 % (ref 11.5–14.5)
GLUCOSE SERPL-MCNC: 169 MG/DL (ref 74–99)
HCT VFR BLD AUTO: 39.1 % (ref 41–52)
HGB BLD-MCNC: 9.6 G/DL (ref 13.5–17.5)
MCH RBC QN AUTO: 18.4 PG (ref 26–34)
MCHC RBC AUTO-ENTMCNC: 24.6 G/DL (ref 32–36)
MCV RBC AUTO: 75 FL (ref 80–100)
NRBC BLD-RTO: 0.2 /100 WBCS (ref 0–0)
PLATELET # BLD AUTO: 408 X10*3/UL (ref 150–450)
POTASSIUM SERPL-SCNC: 4.8 MMOL/L (ref 3.5–5.3)
RBC # BLD AUTO: 5.23 X10*6/UL (ref 4.5–5.9)
SODIUM SERPL-SCNC: 139 MMOL/L (ref 136–145)
WBC # BLD AUTO: 10.5 X10*3/UL (ref 4.4–11.3)

## 2025-07-14 PROCEDURE — 85027 COMPLETE CBC AUTOMATED: CPT | Mod: OUT | Performed by: INTERNAL MEDICINE

## 2025-07-14 PROCEDURE — 36415 COLL VENOUS BLD VENIPUNCTURE: CPT | Mod: OUT | Performed by: INTERNAL MEDICINE

## 2025-07-14 PROCEDURE — 80048 BASIC METABOLIC PNL TOTAL CA: CPT | Mod: OUT | Performed by: INTERNAL MEDICINE

## 2025-07-16 ENCOUNTER — LAB REQUISITION (OUTPATIENT)
Dept: LAB | Facility: HOSPITAL | Age: 55
End: 2025-07-16
Payer: COMMERCIAL

## 2025-07-16 DIAGNOSIS — R53.1 WEAKNESS: ICD-10-CM

## 2025-07-16 LAB
ANION GAP SERPL CALC-SCNC: 9 MMOL/L (ref 10–20)
BUN SERPL-MCNC: 18 MG/DL (ref 6–23)
CALCIUM SERPL-MCNC: 9 MG/DL (ref 8.6–10.3)
CHLORIDE SERPL-SCNC: 100 MMOL/L (ref 98–107)
CO2 SERPL-SCNC: 35 MMOL/L (ref 21–32)
CREAT SERPL-MCNC: 0.9 MG/DL (ref 0.5–1.3)
EGFRCR SERPLBLD CKD-EPI 2021: >90 ML/MIN/1.73M*2
ERYTHROCYTE [DISTWIDTH] IN BLOOD BY AUTOMATED COUNT: 19.9 % (ref 11.5–14.5)
GLUCOSE SERPL-MCNC: 177 MG/DL (ref 74–99)
HCT VFR BLD AUTO: 39.1 % (ref 41–52)
HGB BLD-MCNC: 9.7 G/DL (ref 13.5–17.5)
MCH RBC QN AUTO: 18.5 PG (ref 26–34)
MCHC RBC AUTO-ENTMCNC: 24.8 G/DL (ref 32–36)
MCV RBC AUTO: 75 FL (ref 80–100)
NRBC BLD-RTO: 0.2 /100 WBCS (ref 0–0)
PLATELET # BLD AUTO: 426 X10*3/UL (ref 150–450)
POTASSIUM SERPL-SCNC: 4.4 MMOL/L (ref 3.5–5.3)
RBC # BLD AUTO: 5.24 X10*6/UL (ref 4.5–5.9)
SODIUM SERPL-SCNC: 140 MMOL/L (ref 136–145)
WBC # BLD AUTO: 11.2 X10*3/UL (ref 4.4–11.3)

## 2025-07-16 PROCEDURE — 36415 COLL VENOUS BLD VENIPUNCTURE: CPT | Mod: OUT | Performed by: INTERNAL MEDICINE

## 2025-07-16 PROCEDURE — 80048 BASIC METABOLIC PNL TOTAL CA: CPT | Mod: OUT | Performed by: INTERNAL MEDICINE

## 2025-07-16 PROCEDURE — 85027 COMPLETE CBC AUTOMATED: CPT | Mod: OUT | Performed by: INTERNAL MEDICINE

## 2025-07-22 ENCOUNTER — LAB REQUISITION (OUTPATIENT)
Dept: LAB | Facility: HOSPITAL | Age: 55
End: 2025-07-22
Payer: COMMERCIAL

## 2025-07-22 DIAGNOSIS — Z79.899 OTHER LONG TERM (CURRENT) DRUG THERAPY: ICD-10-CM

## 2025-07-22 DIAGNOSIS — F41.9 ANXIETY DISORDER, UNSPECIFIED: ICD-10-CM

## 2025-07-22 LAB
HOLD SPECIMEN: NORMAL
LEVETIRACETAM SERPL-MCNC: 26 UG/ML (ref 10–40)

## 2025-07-22 PROCEDURE — 80177 DRUG SCRN QUAN LEVETIRACETAM: CPT | Mod: OUT,PARLAB | Performed by: INTERNAL MEDICINE

## 2025-07-22 PROCEDURE — 36415 COLL VENOUS BLD VENIPUNCTURE: CPT | Mod: OUT | Performed by: INTERNAL MEDICINE

## 2025-07-22 PROCEDURE — 80235 DRUG ASSAY LACOSAMIDE: CPT | Mod: OUT | Performed by: INTERNAL MEDICINE

## 2025-07-24 LAB — LACOSAMIDE SERPL-MCNC: 5.6 UG/ML (ref 1–10)

## 2025-08-22 ENCOUNTER — APPOINTMENT (OUTPATIENT)
Dept: RADIOLOGY | Facility: HOSPITAL | Age: 55
End: 2025-08-22
Payer: COMMERCIAL

## 2025-08-22 ENCOUNTER — APPOINTMENT (OUTPATIENT)
Dept: CARDIOLOGY | Facility: HOSPITAL | Age: 55
End: 2025-08-22
Payer: COMMERCIAL

## 2025-08-22 ENCOUNTER — HOSPITAL ENCOUNTER (INPATIENT)
Facility: HOSPITAL | Age: 55
End: 2025-08-22
Attending: EMERGENCY MEDICINE | Admitting: INTERNAL MEDICINE
Payer: COMMERCIAL

## 2025-08-22 PROBLEM — R10.32 LEFT LOWER QUADRANT ABDOMINAL PAIN: Status: ACTIVE | Noted: 2025-08-22

## 2025-08-22 PROCEDURE — 74177 CT ABD & PELVIS W/CONTRAST: CPT

## 2025-08-22 PROCEDURE — 74018 RADEX ABDOMEN 1 VIEW: CPT

## 2025-08-22 PROCEDURE — 71275 CT ANGIOGRAPHY CHEST: CPT

## 2025-08-22 PROCEDURE — 71045 X-RAY EXAM CHEST 1 VIEW: CPT

## 2025-08-22 PROCEDURE — 93005 ELECTROCARDIOGRAM TRACING: CPT

## 2025-08-22 SDOH — SOCIAL STABILITY: SOCIAL INSECURITY: ARE THERE ANY APPARENT SIGNS OF INJURIES/BEHAVIORS THAT COULD BE RELATED TO ABUSE/NEGLECT?: NO

## 2025-08-22 SDOH — SOCIAL STABILITY: SOCIAL INSECURITY: WITHIN THE LAST YEAR, HAVE YOU BEEN HUMILIATED OR EMOTIONALLY ABUSED IN OTHER WAYS BY YOUR PARTNER OR EX-PARTNER?: NO

## 2025-08-22 SDOH — SOCIAL STABILITY: SOCIAL INSECURITY: HAVE YOU HAD ANY THOUGHTS OF HARMING ANYONE ELSE?: NO

## 2025-08-22 SDOH — SOCIAL STABILITY: SOCIAL INSECURITY: WITHIN THE LAST YEAR, HAVE YOU BEEN AFRAID OF YOUR PARTNER OR EX-PARTNER?: NO

## 2025-08-22 SDOH — ECONOMIC STABILITY: INCOME INSECURITY: IN THE PAST 12 MONTHS HAS THE ELECTRIC, GAS, OIL, OR WATER COMPANY THREATENED TO SHUT OFF SERVICES IN YOUR HOME?: NO

## 2025-08-22 SDOH — ECONOMIC STABILITY: FOOD INSECURITY: WITHIN THE PAST 12 MONTHS, YOU WORRIED THAT YOUR FOOD WOULD RUN OUT BEFORE YOU GOT THE MONEY TO BUY MORE.: NEVER TRUE

## 2025-08-22 SDOH — SOCIAL STABILITY: SOCIAL INSECURITY: DO YOU FEEL ANYONE HAS EXPLOITED OR TAKEN ADVANTAGE OF YOU FINANCIALLY OR OF YOUR PERSONAL PROPERTY?: NO

## 2025-08-22 SDOH — SOCIAL STABILITY: SOCIAL INSECURITY: ARE YOU OR HAVE YOU BEEN THREATENED OR ABUSED PHYSICALLY, EMOTIONALLY, OR SEXUALLY BY ANYONE?: NO

## 2025-08-22 SDOH — ECONOMIC STABILITY: FOOD INSECURITY: WITHIN THE PAST 12 MONTHS, THE FOOD YOU BOUGHT JUST DIDN'T LAST AND YOU DIDN'T HAVE MONEY TO GET MORE.: NEVER TRUE

## 2025-08-22 SDOH — SOCIAL STABILITY: SOCIAL INSECURITY: HAVE YOU HAD THOUGHTS OF HARMING ANYONE ELSE?: NO

## 2025-08-22 SDOH — SOCIAL STABILITY: SOCIAL INSECURITY: HAS ANYONE EVER THREATENED TO HURT YOUR FAMILY OR YOUR PETS?: NO

## 2025-08-22 SDOH — SOCIAL STABILITY: SOCIAL INSECURITY: ABUSE: ADULT

## 2025-08-22 SDOH — SOCIAL STABILITY: SOCIAL INSECURITY: DO YOU FEEL UNSAFE GOING BACK TO THE PLACE WHERE YOU ARE LIVING?: NO

## 2025-08-22 SDOH — SOCIAL STABILITY: SOCIAL INSECURITY: WERE YOU ABLE TO COMPLETE ALL THE BEHAVIORAL HEALTH SCREENINGS?: YES

## 2025-08-22 SDOH — SOCIAL STABILITY: SOCIAL INSECURITY: DOES ANYONE TRY TO KEEP YOU FROM HAVING/CONTACTING OTHER FRIENDS OR DOING THINGS OUTSIDE YOUR HOME?: NO

## 2025-08-22 ASSESSMENT — PAIN - FUNCTIONAL ASSESSMENT
PAIN_FUNCTIONAL_ASSESSMENT: 0-10

## 2025-08-22 ASSESSMENT — LIFESTYLE VARIABLES
EVER FELT BAD OR GUILTY ABOUT YOUR DRINKING: NO
PRESCIPTION_ABUSE_PAST_12_MONTHS: NO
HAVE YOU EVER FELT YOU SHOULD CUT DOWN ON YOUR DRINKING: NO
SKIP TO QUESTIONS 9-10: 1
SUBSTANCE_ABUSE_PAST_12_MONTHS: NO
HOW OFTEN DO YOU HAVE A DRINK CONTAINING ALCOHOL: NEVER
AUDIT-C TOTAL SCORE: 0
HAVE PEOPLE ANNOYED YOU BY CRITICIZING YOUR DRINKING: NO
HOW MANY STANDARD DRINKS CONTAINING ALCOHOL DO YOU HAVE ON A TYPICAL DAY: PATIENT DOES NOT DRINK
EVER HAD A DRINK FIRST THING IN THE MORNING TO STEADY YOUR NERVES TO GET RID OF A HANGOVER: NO
HOW OFTEN DO YOU HAVE 6 OR MORE DRINKS ON ONE OCCASION: NEVER
TOTAL SCORE: 0
AUDIT-C TOTAL SCORE: 0

## 2025-08-22 ASSESSMENT — ACTIVITIES OF DAILY LIVING (ADL)
GROOMING: NEEDS ASSISTANCE
HEARING - LEFT EAR: FUNCTIONAL
WALKS IN HOME: DEPENDENT
PATIENT'S MEMORY ADEQUATE TO SAFELY COMPLETE DAILY ACTIVITIES?: NO
ADEQUATE_TO_COMPLETE_ADL: YES
BATHING: NEEDS ASSISTANCE
FEEDING YOURSELF: NEEDS ASSISTANCE
LACK_OF_TRANSPORTATION: NO
DRESSING YOURSELF: NEEDS ASSISTANCE
TOILETING: DEPENDENT
JUDGMENT_ADEQUATE_SAFELY_COMPLETE_DAILY_ACTIVITIES: YES
HEARING - RIGHT EAR: FUNCTIONAL

## 2025-08-22 ASSESSMENT — PAIN DESCRIPTION - DESCRIPTORS
DESCRIPTORS: ACHING
DESCRIPTORS: ACHING;BURNING
DESCRIPTORS: ACHING

## 2025-08-22 ASSESSMENT — COGNITIVE AND FUNCTIONAL STATUS - GENERAL
DRESSING REGULAR LOWER BODY CLOTHING: TOTAL
CLIMB 3 TO 5 STEPS WITH RAILING: TOTAL
PATIENT BASELINE BEDBOUND: YES
MOVING TO AND FROM BED TO CHAIR: TOTAL
MOBILITY SCORE: 6
WALKING IN HOSPITAL ROOM: TOTAL
DRESSING REGULAR UPPER BODY CLOTHING: TOTAL
HELP NEEDED FOR BATHING: TOTAL
MOVING FROM LYING ON BACK TO SITTING ON SIDE OF FLAT BED WITH BEDRAILS: TOTAL
TOILETING: TOTAL
STANDING UP FROM CHAIR USING ARMS: TOTAL
TURNING FROM BACK TO SIDE WHILE IN FLAT BAD: TOTAL
DAILY ACTIVITIY SCORE: 6
PERSONAL GROOMING: TOTAL
EATING MEALS: TOTAL

## 2025-08-22 ASSESSMENT — PAIN SCALES - GENERAL
PAINLEVEL_OUTOF10: 0 - NO PAIN
PAINLEVEL_OUTOF10: 7
PAINLEVEL_OUTOF10: 8
PAINLEVEL_OUTOF10: 6
PAINLEVEL_OUTOF10: 0 - NO PAIN
PAINLEVEL_OUTOF10: 5 - MODERATE PAIN
PAINLEVEL_OUTOF10: 0 - NO PAIN

## 2025-08-22 ASSESSMENT — PAIN DESCRIPTION - LOCATION: LOCATION: ABDOMEN

## 2025-08-23 ENCOUNTER — APPOINTMENT (OUTPATIENT)
Dept: RADIOLOGY | Facility: HOSPITAL | Age: 55
End: 2025-08-23
Payer: COMMERCIAL

## 2025-08-23 PROCEDURE — 74018 RADEX ABDOMEN 1 VIEW: CPT

## 2025-08-23 PROCEDURE — 76770 US EXAM ABDO BACK WALL COMP: CPT

## 2025-08-23 ASSESSMENT — PAIN DESCRIPTION - DESCRIPTORS
DESCRIPTORS: ACHING;BURNING
DESCRIPTORS: ACHING;BURNING

## 2025-08-23 ASSESSMENT — PAIN SCALES - GENERAL
PAINLEVEL_OUTOF10: 7
PAINLEVEL_OUTOF10: 2
PAINLEVEL_OUTOF10: 7
PAINLEVEL_OUTOF10: 0 - NO PAIN
PAINLEVEL_OUTOF10: 3
PAINLEVEL_OUTOF10: 0 - NO PAIN
PAINLEVEL_OUTOF10: 5 - MODERATE PAIN
PAINLEVEL_OUTOF10: 2

## 2025-08-24 ASSESSMENT — PAIN SCALES - GENERAL
PAINLEVEL_OUTOF10: 0 - NO PAIN

## 2025-08-24 ASSESSMENT — PAIN - FUNCTIONAL ASSESSMENT
PAIN_FUNCTIONAL_ASSESSMENT: 0-10

## 2025-08-25 ASSESSMENT — PAIN DESCRIPTION - DESCRIPTORS: DESCRIPTORS: STABBING;SHARP

## 2025-08-25 ASSESSMENT — COGNITIVE AND FUNCTIONAL STATUS - GENERAL
CLIMB 3 TO 5 STEPS WITH RAILING: A LOT
DRESSING REGULAR LOWER BODY CLOTHING: A LOT
MOBILITY SCORE: 12
PERSONAL GROOMING: A LOT
MOVING FROM LYING ON BACK TO SITTING ON SIDE OF FLAT BED WITH BEDRAILS: A LOT
WALKING IN HOSPITAL ROOM: A LOT
STANDING UP FROM CHAIR USING ARMS: A LOT
DRESSING REGULAR UPPER BODY CLOTHING: A LOT
EATING MEALS: A LOT
HELP NEEDED FOR BATHING: A LOT
MOVING TO AND FROM BED TO CHAIR: A LOT
DAILY ACTIVITIY SCORE: 12
TURNING FROM BACK TO SIDE WHILE IN FLAT BAD: A LOT
TOILETING: A LOT

## 2025-08-25 ASSESSMENT — PAIN DESCRIPTION - LOCATION: LOCATION: ABDOMEN

## 2025-08-25 ASSESSMENT — PAIN SCALES - GENERAL
PAINLEVEL_OUTOF10: 0 - NO PAIN
PAINLEVEL_OUTOF10: 7
PAINLEVEL_OUTOF10: 0 - NO PAIN

## 2025-08-25 ASSESSMENT — PAIN - FUNCTIONAL ASSESSMENT
PAIN_FUNCTIONAL_ASSESSMENT: 0-10
PAIN_FUNCTIONAL_ASSESSMENT: 0-10

## 2025-08-25 ASSESSMENT — PAIN DESCRIPTION - ORIENTATION: ORIENTATION: RIGHT

## 2025-08-26 ASSESSMENT — PAIN - FUNCTIONAL ASSESSMENT
PAIN_FUNCTIONAL_ASSESSMENT: 0-10

## 2025-08-26 ASSESSMENT — PAIN SCALES - GENERAL
PAINLEVEL_OUTOF10: 0 - NO PAIN

## 2025-08-26 ASSESSMENT — COGNITIVE AND FUNCTIONAL STATUS - GENERAL
TOILETING: A LOT
MOVING TO AND FROM BED TO CHAIR: A LOT
MOVING FROM LYING ON BACK TO SITTING ON SIDE OF FLAT BED WITH BEDRAILS: A LOT
DRESSING REGULAR LOWER BODY CLOTHING: A LOT
TURNING FROM BACK TO SIDE WHILE IN FLAT BAD: A LOT
EATING MEALS: A LITTLE
MOBILITY SCORE: 10
WALKING IN HOSPITAL ROOM: TOTAL
HELP NEEDED FOR BATHING: A LOT
PERSONAL GROOMING: A LOT
STANDING UP FROM CHAIR USING ARMS: A LOT
DAILY ACTIVITIY SCORE: 13
CLIMB 3 TO 5 STEPS WITH RAILING: TOTAL
DRESSING REGULAR UPPER BODY CLOTHING: A LOT

## 2025-08-27 ENCOUNTER — APPOINTMENT (OUTPATIENT)
Dept: RADIOLOGY | Facility: HOSPITAL | Age: 55
End: 2025-08-27
Payer: COMMERCIAL

## 2025-08-27 PROCEDURE — 74018 RADEX ABDOMEN 1 VIEW: CPT

## 2025-08-27 ASSESSMENT — COGNITIVE AND FUNCTIONAL STATUS - GENERAL
STANDING UP FROM CHAIR USING ARMS: A LOT
CLIMB 3 TO 5 STEPS WITH RAILING: TOTAL
MOVING TO AND FROM BED TO CHAIR: A LOT
EATING MEALS: A LOT
HELP NEEDED FOR BATHING: TOTAL
PERSONAL GROOMING: TOTAL
DAILY ACTIVITIY SCORE: 8
DRESSING REGULAR LOWER BODY CLOTHING: A LOT
TURNING FROM BACK TO SIDE WHILE IN FLAT BAD: A LOT
WALKING IN HOSPITAL ROOM: TOTAL
TOILETING: TOTAL
MOBILITY SCORE: 10
DRESSING REGULAR UPPER BODY CLOTHING: TOTAL
MOVING FROM LYING ON BACK TO SITTING ON SIDE OF FLAT BED WITH BEDRAILS: A LOT

## 2025-08-27 ASSESSMENT — PAIN - FUNCTIONAL ASSESSMENT
PAIN_FUNCTIONAL_ASSESSMENT: 0-10

## 2025-08-27 ASSESSMENT — PAIN SCALES - GENERAL
PAINLEVEL_OUTOF10: 5 - MODERATE PAIN
PAINLEVEL_OUTOF10: 0 - NO PAIN
PAINLEVEL_OUTOF10: 7
PAINLEVEL_OUTOF10: 6
PAINLEVEL_OUTOF10: 7
PAINLEVEL_OUTOF10: 2

## 2025-08-27 ASSESSMENT — PAIN DESCRIPTION - LOCATION
LOCATION: ABDOMEN
LOCATION: ABDOMEN

## 2025-08-27 ASSESSMENT — PAIN DESCRIPTION - ORIENTATION: ORIENTATION: LEFT;LOWER

## 2025-08-28 ENCOUNTER — APPOINTMENT (OUTPATIENT)
Dept: RADIOLOGY | Facility: HOSPITAL | Age: 55
End: 2025-08-28
Payer: COMMERCIAL

## 2025-08-28 PROCEDURE — 74018 RADEX ABDOMEN 1 VIEW: CPT

## 2025-08-28 ASSESSMENT — PAIN SCALES - GENERAL
PAINLEVEL_OUTOF10: 0 - NO PAIN
PAINLEVEL_OUTOF10: 9

## 2025-08-28 ASSESSMENT — COGNITIVE AND FUNCTIONAL STATUS - GENERAL
WALKING IN HOSPITAL ROOM: TOTAL
DAILY ACTIVITIY SCORE: 7
MOVING TO AND FROM BED TO CHAIR: A LOT
EATING MEALS: TOTAL
DRESSING REGULAR UPPER BODY CLOTHING: TOTAL
MOBILITY SCORE: 10
STANDING UP FROM CHAIR USING ARMS: A LOT
CLIMB 3 TO 5 STEPS WITH RAILING: TOTAL
HELP NEEDED FOR BATHING: TOTAL
MOVING FROM LYING ON BACK TO SITTING ON SIDE OF FLAT BED WITH BEDRAILS: A LOT
TOILETING: TOTAL
PERSONAL GROOMING: TOTAL
DRESSING REGULAR LOWER BODY CLOTHING: A LOT
TURNING FROM BACK TO SIDE WHILE IN FLAT BAD: A LOT

## 2025-08-28 ASSESSMENT — PAIN - FUNCTIONAL ASSESSMENT
PAIN_FUNCTIONAL_ASSESSMENT: 0-10

## 2025-08-28 ASSESSMENT — PAIN DESCRIPTION - ORIENTATION: ORIENTATION: LEFT

## 2025-08-28 ASSESSMENT — PAIN DESCRIPTION - LOCATION: LOCATION: ABDOMEN

## 2025-08-29 ENCOUNTER — APPOINTMENT (OUTPATIENT)
Dept: GASTROENTEROLOGY | Facility: HOSPITAL | Age: 55
End: 2025-08-29
Payer: COMMERCIAL

## 2025-08-29 ENCOUNTER — ANESTHESIA EVENT (OUTPATIENT)
Dept: GASTROENTEROLOGY | Facility: HOSPITAL | Age: 55
End: 2025-08-29
Payer: COMMERCIAL

## 2025-08-29 ENCOUNTER — ANESTHESIA (OUTPATIENT)
Dept: GASTROENTEROLOGY | Facility: HOSPITAL | Age: 55
End: 2025-08-29
Payer: COMMERCIAL

## 2025-08-29 PROCEDURE — 3700000002 HC GENERAL ANESTHESIA TIME - EACH INCREMENTAL 1 MINUTE

## 2025-08-29 PROCEDURE — 7100000001 HC RECOVERY ROOM TIME - INITIAL BASE CHARGE

## 2025-08-29 PROCEDURE — 7100000002 HC RECOVERY ROOM TIME - EACH INCREMENTAL 1 MINUTE

## 2025-08-29 PROCEDURE — 3700000001 HC GENERAL ANESTHESIA TIME - INITIAL BASE CHARGE

## 2025-08-29 PROCEDURE — 43235 EGD DIAGNOSTIC BRUSH WASH: CPT

## 2025-08-29 PROCEDURE — 43235 EGD DIAGNOSTIC BRUSH WASH: CPT | Performed by: INTERNAL MEDICINE

## 2025-08-29 SDOH — HEALTH STABILITY: MENTAL HEALTH: CURRENT SMOKER: 0

## 2025-08-29 ASSESSMENT — COGNITIVE AND FUNCTIONAL STATUS - GENERAL
TURNING FROM BACK TO SIDE WHILE IN FLAT BAD: A LOT
STANDING UP FROM CHAIR USING ARMS: A LOT
MOVING TO AND FROM BED TO CHAIR: A LOT
MOBILITY SCORE: 10
TOILETING: TOTAL
MOVING FROM LYING ON BACK TO SITTING ON SIDE OF FLAT BED WITH BEDRAILS: A LOT
DAILY ACTIVITIY SCORE: 8
EATING MEALS: TOTAL
CLIMB 3 TO 5 STEPS WITH RAILING: TOTAL
HELP NEEDED FOR BATHING: TOTAL
DRESSING REGULAR UPPER BODY CLOTHING: TOTAL
DRESSING REGULAR LOWER BODY CLOTHING: A LOT
WALKING IN HOSPITAL ROOM: TOTAL
PERSONAL GROOMING: A LOT

## 2025-08-29 ASSESSMENT — PAIN SCALES - GENERAL
PAIN_LEVEL: 0
PAINLEVEL_OUTOF10: 0 - NO PAIN
PAINLEVEL_OUTOF10: 0 - NO PAIN

## 2025-08-30 ENCOUNTER — APPOINTMENT (OUTPATIENT)
Dept: RADIOLOGY | Facility: HOSPITAL | Age: 55
End: 2025-08-30
Payer: COMMERCIAL

## 2025-08-30 PROCEDURE — 74019 RADEX ABDOMEN 2 VIEWS: CPT

## 2025-08-30 ASSESSMENT — COGNITIVE AND FUNCTIONAL STATUS - GENERAL
DRESSING REGULAR LOWER BODY CLOTHING: A LOT
DRESSING REGULAR UPPER BODY CLOTHING: TOTAL
MOVING TO AND FROM BED TO CHAIR: A LOT
TOILETING: TOTAL
HELP NEEDED FOR BATHING: TOTAL
DAILY ACTIVITIY SCORE: 8
WALKING IN HOSPITAL ROOM: TOTAL
PERSONAL GROOMING: A LOT
MOVING FROM LYING ON BACK TO SITTING ON SIDE OF FLAT BED WITH BEDRAILS: A LOT
TURNING FROM BACK TO SIDE WHILE IN FLAT BAD: A LOT
EATING MEALS: TOTAL
EATING MEALS: TOTAL
HELP NEEDED FOR BATHING: TOTAL
WALKING IN HOSPITAL ROOM: TOTAL
DRESSING REGULAR UPPER BODY CLOTHING: TOTAL
CLIMB 3 TO 5 STEPS WITH RAILING: TOTAL
MOBILITY SCORE: 10
CLIMB 3 TO 5 STEPS WITH RAILING: TOTAL
STANDING UP FROM CHAIR USING ARMS: A LOT
TOILETING: TOTAL
PERSONAL GROOMING: A LOT
TURNING FROM BACK TO SIDE WHILE IN FLAT BAD: A LOT
MOVING TO AND FROM BED TO CHAIR: A LOT
DAILY ACTIVITIY SCORE: 8
DRESSING REGULAR LOWER BODY CLOTHING: A LOT
MOVING FROM LYING ON BACK TO SITTING ON SIDE OF FLAT BED WITH BEDRAILS: A LOT
MOBILITY SCORE: 10
STANDING UP FROM CHAIR USING ARMS: A LOT

## 2025-08-30 ASSESSMENT — PAIN SCALES - GENERAL: PAINLEVEL_OUTOF10: 0 - NO PAIN

## 2025-08-31 ASSESSMENT — COGNITIVE AND FUNCTIONAL STATUS - GENERAL
MOVING FROM LYING ON BACK TO SITTING ON SIDE OF FLAT BED WITH BEDRAILS: A LOT
STANDING UP FROM CHAIR USING ARMS: A LOT
TOILETING: A LITTLE
DAILY ACTIVITIY SCORE: 17
HELP NEEDED FOR BATHING: A LOT
STANDING UP FROM CHAIR USING ARMS: A LOT
WALKING IN HOSPITAL ROOM: TOTAL
MOBILITY SCORE: 10
MOBILITY SCORE: 10
MOVING FROM LYING ON BACK TO SITTING ON SIDE OF FLAT BED WITH BEDRAILS: A LOT
HELP NEEDED FOR BATHING: A LOT
CLIMB 3 TO 5 STEPS WITH RAILING: TOTAL
DRESSING REGULAR LOWER BODY CLOTHING: A LOT
EATING MEALS: A LITTLE
TURNING FROM BACK TO SIDE WHILE IN FLAT BAD: A LOT
TOILETING: A LITTLE
DRESSING REGULAR UPPER BODY CLOTHING: A LITTLE
WALKING IN HOSPITAL ROOM: TOTAL
CLIMB 3 TO 5 STEPS WITH RAILING: TOTAL
MOVING TO AND FROM BED TO CHAIR: A LOT
DRESSING REGULAR UPPER BODY CLOTHING: A LITTLE
MOVING TO AND FROM BED TO CHAIR: A LOT
DRESSING REGULAR LOWER BODY CLOTHING: A LOT
EATING MEALS: A LITTLE
DAILY ACTIVITIY SCORE: 17
TURNING FROM BACK TO SIDE WHILE IN FLAT BAD: A LOT

## 2025-08-31 ASSESSMENT — PAIN - FUNCTIONAL ASSESSMENT
PAIN_FUNCTIONAL_ASSESSMENT: 0-10

## 2025-08-31 ASSESSMENT — PAIN SCALES - GENERAL
PAINLEVEL_OUTOF10: 0 - NO PAIN

## 2025-09-01 ASSESSMENT — COGNITIVE AND FUNCTIONAL STATUS - GENERAL
TURNING FROM BACK TO SIDE WHILE IN FLAT BAD: A LOT
MOBILITY SCORE: 10
CLIMB 3 TO 5 STEPS WITH RAILING: TOTAL
MOBILITY SCORE: 9
WALKING IN HOSPITAL ROOM: TOTAL
DRESSING REGULAR UPPER BODY CLOTHING: A LITTLE
CLIMB 3 TO 5 STEPS WITH RAILING: TOTAL
DRESSING REGULAR LOWER BODY CLOTHING: A LOT
STANDING UP FROM CHAIR USING ARMS: TOTAL
DAILY ACTIVITIY SCORE: 15
PERSONAL GROOMING: A LITTLE
STANDING UP FROM CHAIR USING ARMS: A LOT
MOVING TO AND FROM BED TO CHAIR: TOTAL
MOVING TO AND FROM BED TO CHAIR: A LOT
MOVING FROM LYING ON BACK TO SITTING ON SIDE OF FLAT BED WITH BEDRAILS: A LITTLE
HELP NEEDED FOR BATHING: A LOT
WALKING IN HOSPITAL ROOM: TOTAL
HELP NEEDED FOR BATHING: A LOT
TOILETING: A LITTLE
TOILETING: A LOT
EATING MEALS: A LITTLE
DAILY ACTIVITIY SCORE: 17
TURNING FROM BACK TO SIDE WHILE IN FLAT BAD: A LOT
DRESSING REGULAR LOWER BODY CLOTHING: A LOT
EATING MEALS: A LITTLE
MOVING FROM LYING ON BACK TO SITTING ON SIDE OF FLAT BED WITH BEDRAILS: A LOT
DRESSING REGULAR UPPER BODY CLOTHING: A LITTLE

## 2025-09-01 ASSESSMENT — PAIN - FUNCTIONAL ASSESSMENT
PAIN_FUNCTIONAL_ASSESSMENT: 0-10
PAIN_FUNCTIONAL_ASSESSMENT: 0-10

## 2025-09-01 ASSESSMENT — PAIN SCALES - GENERAL
PAINLEVEL_OUTOF10: 0 - NO PAIN
PAINLEVEL_OUTOF10: 0 - NO PAIN

## 2025-09-02 ASSESSMENT — COGNITIVE AND FUNCTIONAL STATUS - GENERAL
MOVING FROM LYING ON BACK TO SITTING ON SIDE OF FLAT BED WITH BEDRAILS: A LITTLE
TURNING FROM BACK TO SIDE WHILE IN FLAT BAD: A LOT
CLIMB 3 TO 5 STEPS WITH RAILING: TOTAL
TOILETING: A LITTLE
MOBILITY SCORE: 9
DAILY ACTIVITIY SCORE: 15
DRESSING REGULAR UPPER BODY CLOTHING: A LOT
HELP NEEDED FOR BATHING: A LOT
EATING MEALS: A LITTLE
DRESSING REGULAR LOWER BODY CLOTHING: A LOT
MOVING TO AND FROM BED TO CHAIR: TOTAL
WALKING IN HOSPITAL ROOM: TOTAL
PERSONAL GROOMING: A LITTLE
STANDING UP FROM CHAIR USING ARMS: TOTAL

## 2025-09-02 ASSESSMENT — PAIN - FUNCTIONAL ASSESSMENT
PAIN_FUNCTIONAL_ASSESSMENT: 0-10
PAIN_FUNCTIONAL_ASSESSMENT: 0-10

## 2025-09-02 ASSESSMENT — PAIN SCALES - GENERAL
PAINLEVEL_OUTOF10: 0 - NO PAIN
PAINLEVEL_OUTOF10: 0 - NO PAIN

## 2025-09-03 ASSESSMENT — PAIN SCALES - GENERAL: PAINLEVEL_OUTOF10: 0 - NO PAIN

## 2025-09-04 ENCOUNTER — HOSPITAL ENCOUNTER (EMERGENCY)
Facility: HOSPITAL | Age: 55
Discharge: STILL ADMITTED OR ADMINISTRATIVE | End: 2025-09-04
Payer: COMMERCIAL

## 2025-09-04 ENCOUNTER — HOSPITAL ENCOUNTER (OUTPATIENT)
Dept: RADIOLOGY | Facility: HOSPITAL | Age: 55
Discharge: HOME | End: 2025-09-04
Payer: COMMERCIAL

## 2025-09-04 PROCEDURE — 71045 X-RAY EXAM CHEST 1 VIEW: CPT

## 2025-09-04 PROCEDURE — 99282 EMERGENCY DEPT VISIT SF MDM: CPT

## 2025-09-04 PROCEDURE — 71045 X-RAY EXAM CHEST 1 VIEW: CPT | Performed by: RADIOLOGY

## 2025-09-04 ASSESSMENT — COGNITIVE AND FUNCTIONAL STATUS - GENERAL
TOILETING: TOTAL
STANDING UP FROM CHAIR USING ARMS: TOTAL
EATING MEALS: TOTAL
PERSONAL GROOMING: TOTAL
DRESSING REGULAR LOWER BODY CLOTHING: TOTAL
TURNING FROM BACK TO SIDE WHILE IN FLAT BAD: TOTAL
HELP NEEDED FOR BATHING: TOTAL
MOVING TO AND FROM BED TO CHAIR: TOTAL
MOVING FROM LYING ON BACK TO SITTING ON SIDE OF FLAT BED WITH BEDRAILS: TOTAL
DAILY ACTIVITIY SCORE: 6
DRESSING REGULAR UPPER BODY CLOTHING: TOTAL
MOBILITY SCORE: 6
CLIMB 3 TO 5 STEPS WITH RAILING: TOTAL
WALKING IN HOSPITAL ROOM: TOTAL

## 2025-09-04 ASSESSMENT — PAIN SCALES - GENERAL: PAINLEVEL_OUTOF10: 0 - NO PAIN
